# Patient Record
Sex: MALE | Race: BLACK OR AFRICAN AMERICAN | Employment: OTHER | URBAN - METROPOLITAN AREA
[De-identification: names, ages, dates, MRNs, and addresses within clinical notes are randomized per-mention and may not be internally consistent; named-entity substitution may affect disease eponyms.]

---

## 2017-02-26 ENCOUNTER — OFFICE VISIT (OUTPATIENT)
Dept: URGENT CARE | Facility: CLINIC | Age: 52
End: 2017-02-26
Payer: COMMERCIAL

## 2017-02-26 LAB — GLUCOSE SERPL-MCNC: 109 MG/DL (ref 65–140)

## 2017-02-26 PROCEDURE — 82948 REAGENT STRIP/BLOOD GLUCOSE: CPT

## 2017-02-26 PROCEDURE — 93005 ELECTROCARDIOGRAM TRACING: CPT

## 2017-02-26 PROCEDURE — G0383 LEV 4 HOSP TYPE B ED VISIT: HCPCS

## 2019-05-28 ENCOUNTER — APPOINTMENT (OUTPATIENT)
Dept: LAB | Facility: CLINIC | Age: 54
End: 2019-05-28
Payer: MEDICARE

## 2019-05-28 ENCOUNTER — TRANSCRIBE ORDERS (OUTPATIENT)
Dept: LAB | Facility: CLINIC | Age: 54
End: 2019-05-28

## 2019-05-28 DIAGNOSIS — E11.9 TYPE 2 DIABETES MELLITUS WITHOUT COMPLICATION, UNSPECIFIED WHETHER LONG TERM INSULIN USE (HCC): ICD-10-CM

## 2019-05-28 DIAGNOSIS — I10 ESSENTIAL HYPERTENSION, MALIGNANT: ICD-10-CM

## 2019-05-28 DIAGNOSIS — I10 ESSENTIAL HYPERTENSION, MALIGNANT: Primary | ICD-10-CM

## 2019-05-28 DIAGNOSIS — Z79.899 ENCOUNTER FOR LONG-TERM (CURRENT) USE OF OTHER MEDICATIONS: ICD-10-CM

## 2019-05-28 DIAGNOSIS — R60.9 EDEMA, UNSPECIFIED TYPE: ICD-10-CM

## 2019-05-28 DIAGNOSIS — N42.9 DISEASE OF PROSTATE: ICD-10-CM

## 2019-05-28 LAB
25(OH)D3 SERPL-MCNC: 13.6 NG/ML (ref 30–100)
ALBUMIN SERPL BCP-MCNC: 3.6 G/DL (ref 3.5–5)
ALP SERPL-CCNC: 71 U/L (ref 46–116)
ALT SERPL W P-5'-P-CCNC: 22 U/L (ref 12–78)
ANION GAP SERPL CALCULATED.3IONS-SCNC: 8 MMOL/L (ref 4–13)
AST SERPL W P-5'-P-CCNC: 12 U/L (ref 5–45)
BILIRUB SERPL-MCNC: 0.57 MG/DL (ref 0.2–1)
BUN SERPL-MCNC: 9 MG/DL (ref 5–25)
CALCIUM SERPL-MCNC: 8.9 MG/DL (ref 8.3–10.1)
CHLORIDE SERPL-SCNC: 104 MMOL/L (ref 100–108)
CHOLEST SERPL-MCNC: 231 MG/DL (ref 50–200)
CO2 SERPL-SCNC: 27 MMOL/L (ref 21–32)
CREAT SERPL-MCNC: 1.09 MG/DL (ref 0.6–1.3)
CREAT UR-MCNC: 161 MG/DL
ERYTHROCYTE [DISTWIDTH] IN BLOOD BY AUTOMATED COUNT: 14.6 % (ref 11.6–15.1)
EST. AVERAGE GLUCOSE BLD GHB EST-MCNC: 134 MG/DL
GFR SERPL CREATININE-BSD FRML MDRD: 88 ML/MIN/1.73SQ M
GLUCOSE P FAST SERPL-MCNC: 94 MG/DL (ref 65–99)
HBA1C MFR BLD: 6.3 % (ref 4.2–6.3)
HCT VFR BLD AUTO: 48 % (ref 36.5–49.3)
HDLC SERPL-MCNC: 36 MG/DL (ref 40–60)
HGB BLD-MCNC: 15.3 G/DL (ref 12–17)
LDLC SERPL CALC-MCNC: 167 MG/DL (ref 0–100)
MCH RBC QN AUTO: 29.1 PG (ref 26.8–34.3)
MCHC RBC AUTO-ENTMCNC: 31.9 G/DL (ref 31.4–37.4)
MCV RBC AUTO: 91 FL (ref 82–98)
MICROALBUMIN UR-MCNC: 13.8 MG/L (ref 0–20)
MICROALBUMIN/CREAT 24H UR: 9 MG/G CREATININE (ref 0–30)
NONHDLC SERPL-MCNC: 195 MG/DL
NT-PROBNP SERPL-MCNC: 96 PG/ML
PLATELET # BLD AUTO: 291 THOUSANDS/UL (ref 149–390)
PMV BLD AUTO: 10.1 FL (ref 8.9–12.7)
POTASSIUM SERPL-SCNC: 3.9 MMOL/L (ref 3.5–5.3)
PROT SERPL-MCNC: 7.8 G/DL (ref 6.4–8.2)
PSA SERPL-MCNC: 0.9 NG/ML (ref 0–4)
RBC # BLD AUTO: 5.26 MILLION/UL (ref 3.88–5.62)
SODIUM SERPL-SCNC: 139 MMOL/L (ref 136–145)
T4 FREE SERPL-MCNC: 0.95 NG/DL (ref 0.76–1.46)
TRIGL SERPL-MCNC: 139 MG/DL
TSH SERPL DL<=0.05 MIU/L-ACNC: 2.83 UIU/ML (ref 0.36–3.74)
WBC # BLD AUTO: 11.25 THOUSAND/UL (ref 4.31–10.16)

## 2019-05-28 PROCEDURE — 84439 ASSAY OF FREE THYROXINE: CPT

## 2019-05-28 PROCEDURE — G0103 PSA SCREENING: HCPCS

## 2019-05-28 PROCEDURE — 85027 COMPLETE CBC AUTOMATED: CPT

## 2019-05-28 PROCEDURE — 80061 LIPID PANEL: CPT

## 2019-05-28 PROCEDURE — 82570 ASSAY OF URINE CREATININE: CPT

## 2019-05-28 PROCEDURE — 82043 UR ALBUMIN QUANTITATIVE: CPT

## 2019-05-28 PROCEDURE — 80053 COMPREHEN METABOLIC PANEL: CPT

## 2019-05-28 PROCEDURE — 86803 HEPATITIS C AB TEST: CPT

## 2019-05-28 PROCEDURE — 84443 ASSAY THYROID STIM HORMONE: CPT

## 2019-05-28 PROCEDURE — 83880 ASSAY OF NATRIURETIC PEPTIDE: CPT

## 2019-05-28 PROCEDURE — 36415 COLL VENOUS BLD VENIPUNCTURE: CPT

## 2019-05-28 PROCEDURE — 83036 HEMOGLOBIN GLYCOSYLATED A1C: CPT

## 2019-05-28 PROCEDURE — 82306 VITAMIN D 25 HYDROXY: CPT

## 2019-05-29 LAB — HCV AB SER QL: NORMAL

## 2019-10-18 VITALS
HEART RATE: 112 BPM | DIASTOLIC BLOOD PRESSURE: 87 MMHG | HEIGHT: 70 IN | SYSTOLIC BLOOD PRESSURE: 139 MMHG | WEIGHT: 315 LBS | BODY MASS INDEX: 45.1 KG/M2 | RESPIRATION RATE: 18 BRPM

## 2019-10-18 DIAGNOSIS — M51.26 PROTRUSION OF LUMBAR INTERVERTEBRAL DISC: ICD-10-CM

## 2019-10-18 DIAGNOSIS — M48.061 FORAMINAL STENOSIS OF LUMBAR REGION: ICD-10-CM

## 2019-10-18 DIAGNOSIS — M47.816 FACET ARTHROPATHY, LUMBAR: ICD-10-CM

## 2019-10-18 DIAGNOSIS — M43.16 SPONDYLOLISTHESIS AT L4-L5 LEVEL: ICD-10-CM

## 2019-10-18 DIAGNOSIS — M48.00 CENTRAL STENOSIS OF SPINAL CANAL: Primary | ICD-10-CM

## 2019-10-18 DIAGNOSIS — M62.830 LUMBAR PARASPINAL MUSCLE SPASM: ICD-10-CM

## 2019-10-18 PROCEDURE — 99203 OFFICE O/P NEW LOW 30 MIN: CPT | Performed by: FAMILY MEDICINE

## 2019-10-18 RX ORDER — DICLOFENAC SODIUM 75 MG/1
75 TABLET, DELAYED RELEASE ORAL 2 TIMES DAILY
Qty: 60 TABLET | Refills: 2 | Status: SHIPPED | OUTPATIENT
Start: 2019-10-18 | End: 2020-02-20 | Stop reason: SDUPTHER

## 2019-10-18 RX ORDER — MELOXICAM 15 MG/1
TABLET ORAL
COMMUNITY
End: 2020-08-21 | Stop reason: HOSPADM

## 2019-10-18 RX ORDER — PREGABALIN 200 MG/1
200 CAPSULE ORAL DAILY
COMMUNITY

## 2019-10-18 RX ORDER — METHOCARBAMOL 750 MG/1
750 TABLET, FILM COATED ORAL 3 TIMES DAILY PRN
Qty: 90 TABLET | Refills: 2 | Status: SHIPPED | OUTPATIENT
Start: 2019-10-18 | End: 2020-08-21 | Stop reason: HOSPADM

## 2019-10-18 RX ORDER — IRBESARTAN AND HYDROCHLOROTHIAZIDE 300; 12.5 MG/1; MG/1
TABLET, FILM COATED ORAL
COMMUNITY
End: 2020-08-21 | Stop reason: HOSPADM
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
## 2019-10-23 ENCOUNTER — TELEPHONE (OUTPATIENT)
Dept: OBGYN CLINIC | Facility: CLINIC | Age: 54
End: 2019-10-23

## 2019-10-23 DIAGNOSIS — M62.830 LUMBAR PARASPINAL MUSCLE SPASM: Primary | ICD-10-CM

## 2019-10-23 RX ORDER — CYCLOBENZAPRINE HCL 10 MG
10 TABLET ORAL 2 TIMES DAILY PRN
Qty: 30 TABLET | Refills: 1 | Status: SHIPPED | OUTPATIENT
Start: 2019-10-23

## 2019-10-23 NOTE — TELEPHONE ENCOUNTER
Dr Adame           [facility] pharmacy called in stating methocarbamol (ROBAXIN) 750 mg is not covered by his insurance   Please advise, thank you

## 2019-10-24 NOTE — TELEPHONE ENCOUNTER
Spoke with patient and advised him that a script for cyclobenzaprine was sent to his pharmacy which he may  at his earliest convenience  Patient verbalized understanding of the same

## 2019-10-24 NOTE — TELEPHONE ENCOUNTER
Please advise patient that Cyclobenzaprine was sent to his pharmacy  He may  at his earliest convenience    Thanks

## 2019-10-28 ENCOUNTER — OFFICE VISIT (OUTPATIENT)
Dept: OBGYN CLINIC | Facility: HOSPITAL | Age: 54
End: 2019-10-28
Attending: FAMILY MEDICINE
Payer: COMMERCIAL

## 2019-10-28 VITALS
SYSTOLIC BLOOD PRESSURE: 126 MMHG | WEIGHT: 315 LBS | BODY MASS INDEX: 45.1 KG/M2 | DIASTOLIC BLOOD PRESSURE: 84 MMHG | HEIGHT: 70 IN | HEART RATE: 106 BPM

## 2019-10-28 DIAGNOSIS — M47.816 FACET ARTHROPATHY, LUMBAR: ICD-10-CM

## 2019-10-28 DIAGNOSIS — M51.26 PROTRUSION OF LUMBAR INTERVERTEBRAL DISC: ICD-10-CM

## 2019-10-28 DIAGNOSIS — M48.00 CENTRAL STENOSIS OF SPINAL CANAL: ICD-10-CM

## 2019-10-28 DIAGNOSIS — M48.061 FORAMINAL STENOSIS OF LUMBAR REGION: ICD-10-CM

## 2019-10-28 DIAGNOSIS — M54.16 LUMBAR RADICULOPATHY: Primary | ICD-10-CM

## 2019-10-28 DIAGNOSIS — M43.16 SPONDYLOLISTHESIS AT L4-L5 LEVEL: ICD-10-CM

## 2019-10-28 PROCEDURE — 99213 OFFICE O/P EST LOW 20 MIN: CPT | Performed by: ORTHOPAEDIC SURGERY
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
## 2020-01-30 PROBLEM — E11.9 DIABETES MELLITUS (HCC): Status: ACTIVE | Noted: 2017-09-14

## 2020-01-30 PROBLEM — A04.8 HELICOBACTER PYLORI INFECTION: Status: ACTIVE | Noted: 2017-02-26

## 2020-01-30 PROBLEM — M51.26 HERNIATION OF LUMBAR INTERVERTEBRAL DISC: Status: ACTIVE | Noted: 2017-02-26

## 2020-01-30 PROBLEM — G89.29 CHRONIC PAIN: Status: ACTIVE | Noted: 2017-09-14

## 2020-01-30 PROBLEM — E78.1 HYPERTRIGLYCERIDEMIA: Status: ACTIVE | Noted: 2017-03-22

## 2020-01-30 PROBLEM — J44.9 CHRONIC OBSTRUCTIVE LUNG DISEASE (HCC): Status: ACTIVE | Noted: 2017-09-14

## 2020-01-30 PROBLEM — K21.9 GERD (GASTROESOPHAGEAL REFLUX DISEASE): Status: ACTIVE | Noted: 2017-02-26

## 2020-01-30 PROBLEM — M65.30 ACQUIRED TRIGGER FINGER: Status: ACTIVE | Noted: 2020-01-13

## 2020-01-30 PROBLEM — J45.909 ASTHMA: Status: ACTIVE | Noted: 2017-09-14

## 2020-01-31 ENCOUNTER — TELEPHONE (OUTPATIENT)
Dept: OTHER | Facility: OTHER | Age: 55
End: 2020-01-31

## 2020-02-12 DIAGNOSIS — M47.816 FACET ARTHROPATHY, LUMBAR: ICD-10-CM

## 2020-02-14 ENCOUNTER — TELEPHONE (OUTPATIENT)
Dept: OBGYN CLINIC | Facility: HOSPITAL | Age: 55
End: 2020-02-14

## 2020-02-14 NOTE — TELEPHONE ENCOUNTER
Patient of Dr Cahti Jolley called for refill on:     diclofenac (VOLTAREN) 75 mg EC tablet [[id number]]     Order Details   Dose: 75 mg Route: Oral Frequency: 2 times daily   Dispense Quantity: 60 tablet Refills: 2 Fills remaining: --           Sig: Take 1 tablet (75 mg total) by mouth 2 (two) times a day            PHARMACY    [facility] [place] [address] [facility] Phone:  [phone] Fax:  [phone]    Address:  [address] [place] [address], [address] [place] [address]  [id number] HEMA #:  [id number]

## 2020-02-17 NOTE — TELEPHONE ENCOUNTER
Please reach out to patient and advise him that if he is currently seeing his pain management provider, he may request Rx from his provider      Thanks

## 2020-02-20 DIAGNOSIS — M54.16 RADICULOPATHY, LUMBAR REGION: Primary | ICD-10-CM

## 2020-02-20 DIAGNOSIS — M47.816 FACET ARTHROPATHY, LUMBAR: ICD-10-CM

## 2020-02-20 RX ORDER — DICLOFENAC SODIUM 75 MG/1
75 TABLET, DELAYED RELEASE ORAL 2 TIMES DAILY
Qty: 60 TABLET | Refills: 1 | Status: SHIPPED | OUTPATIENT
Start: 2020-02-20 | End: 2020-04-09 | Stop reason: SDUPTHER

## 2020-02-20 NOTE — TELEPHONE ENCOUNTER
Patient called in requesting a refill of diclofenac (VOLTAREN) 75mg  Provide msg above  Patient will be reaching out to pain management

## 2020-02-20 NOTE — TELEPHONE ENCOUNTER
Refill sent to pharmacy  Referral placed for pain management Dr Saman Durand  Please assist patient with making appointment to see pain management      Thanks

## 2020-02-20 NOTE — TELEPHONE ENCOUNTER
Patient called stating that his other doctor can not fill RX because he is out of town  He is asking if you can do one more refill since he is out completely  Please advise  [facility]   He is also asking for a referral to another doctor, states you are aware of his problem

## 2020-04-09 ENCOUNTER — TELEPHONE (OUTPATIENT)
Dept: OBGYN CLINIC | Facility: HOSPITAL | Age: 55
End: 2020-04-09

## 2020-04-09 DIAGNOSIS — M47.816 FACET ARTHROPATHY, LUMBAR: ICD-10-CM

## 2020-04-09 RX ORDER — DICLOFENAC SODIUM 75 MG/1
75 TABLET, DELAYED RELEASE ORAL 2 TIMES DAILY
Qty: 60 TABLET | Refills: 1 | Status: SHIPPED | OUTPATIENT
Start: 2020-04-09 | End: 2020-08-21 | Stop reason: HOSPADM

## 2020-04-21 RX ORDER — DICLOFENAC SODIUM 75 MG/1
TABLET, DELAYED RELEASE ORAL
Qty: 60 TABLET | Refills: 2 | OUTPATIENT
Start: 2020-04-21

## 2020-06-26 ENCOUNTER — APPOINTMENT (EMERGENCY)
Dept: CT IMAGING | Facility: HOSPITAL | Age: 55
DRG: 291 | End: 2020-06-26
Payer: MEDICARE

## 2020-06-26 ENCOUNTER — APPOINTMENT (EMERGENCY)
Dept: RADIOLOGY | Facility: HOSPITAL | Age: 55
DRG: 291 | End: 2020-06-26
Payer: MEDICARE

## 2020-06-26 ENCOUNTER — HOSPITAL ENCOUNTER (INPATIENT)
Facility: HOSPITAL | Age: 55
LOS: 3 days | Discharge: LEFT BEFORE DISCHARGE | DRG: 291 | End: 2020-06-29
Attending: EMERGENCY MEDICINE | Admitting: FAMILY MEDICINE
Payer: MEDICARE

## 2020-06-26 ENCOUNTER — APPOINTMENT (INPATIENT)
Dept: NON INVASIVE DIAGNOSTICS | Facility: HOSPITAL | Age: 55
DRG: 291 | End: 2020-06-26
Payer: MEDICARE

## 2020-06-26 DIAGNOSIS — I50.21 ACUTE SYSTOLIC CONGESTIVE HEART FAILURE (HCC): ICD-10-CM

## 2020-06-26 DIAGNOSIS — R77.8 ELEVATED TROPONIN: ICD-10-CM

## 2020-06-26 DIAGNOSIS — R06.00 DYSPNEA, UNSPECIFIED TYPE: Primary | ICD-10-CM

## 2020-06-26 PROBLEM — I50.9 ACUTE CHF (CONGESTIVE HEART FAILURE) (HCC): Status: ACTIVE | Noted: 2020-06-26

## 2020-06-26 LAB
ANION GAP SERPL CALCULATED.3IONS-SCNC: 11 MMOL/L (ref 4–13)
ATRIAL RATE: 114 BPM
BASOPHILS # BLD AUTO: 0.04 THOUSANDS/ΜL (ref 0–0.1)
BASOPHILS NFR BLD AUTO: 0 % (ref 0–1)
BUN SERPL-MCNC: 21 MG/DL (ref 5–25)
CALCIUM SERPL-MCNC: 8.1 MG/DL (ref 8.3–10.1)
CHLORIDE SERPL-SCNC: 102 MMOL/L (ref 100–108)
CO2 SERPL-SCNC: 23 MMOL/L (ref 21–32)
CREAT SERPL-MCNC: 1.34 MG/DL (ref 0.6–1.3)
D DIMER PPP FEU-MCNC: 2.06 UG/ML FEU
EOSINOPHIL # BLD AUTO: 0.22 THOUSAND/ΜL (ref 0–0.61)
EOSINOPHIL NFR BLD AUTO: 2 % (ref 0–6)
ERYTHROCYTE [DISTWIDTH] IN BLOOD BY AUTOMATED COUNT: 16.2 % (ref 11.6–15.1)
EST. AVERAGE GLUCOSE BLD GHB EST-MCNC: 131 MG/DL
GFR SERPL CREATININE-BSD FRML MDRD: 68 ML/MIN/1.73SQ M
GLUCOSE SERPL-MCNC: 178 MG/DL (ref 65–140)
HBA1C MFR BLD: 6.2 %
HCT VFR BLD AUTO: 44 % (ref 36.5–49.3)
HGB BLD-MCNC: 14.1 G/DL (ref 12–17)
IMM GRANULOCYTES # BLD AUTO: 0.07 THOUSAND/UL (ref 0–0.2)
IMM GRANULOCYTES NFR BLD AUTO: 1 % (ref 0–2)
LYMPHOCYTES # BLD AUTO: 3.13 THOUSANDS/ΜL (ref 0.6–4.47)
LYMPHOCYTES NFR BLD AUTO: 27 % (ref 14–44)
MCH RBC QN AUTO: 29.3 PG (ref 26.8–34.3)
MCHC RBC AUTO-ENTMCNC: 32 G/DL (ref 31.4–37.4)
MCV RBC AUTO: 92 FL (ref 82–98)
MONOCYTES # BLD AUTO: 0.77 THOUSAND/ΜL (ref 0.17–1.22)
MONOCYTES NFR BLD AUTO: 7 % (ref 4–12)
NEUTROPHILS # BLD AUTO: 7.18 THOUSANDS/ΜL (ref 1.85–7.62)
NEUTS SEG NFR BLD AUTO: 63 % (ref 43–75)
NRBC BLD AUTO-RTO: 0 /100 WBCS
NT-PROBNP SERPL-MCNC: 4410 PG/ML
P AXIS: 66 DEGREES
PLATELET # BLD AUTO: 252 THOUSANDS/UL (ref 149–390)
PMV BLD AUTO: 10 FL (ref 8.9–12.7)
POTASSIUM SERPL-SCNC: 4.1 MMOL/L (ref 3.5–5.3)
PR INTERVAL: 138 MS
QRS AXIS: -26 DEGREES
QRSD INTERVAL: 74 MS
QT INTERVAL: 330 MS
QTC INTERVAL: 454 MS
RBC # BLD AUTO: 4.81 MILLION/UL (ref 3.88–5.62)
SARS-COV-2 RNA RESP QL NAA+PROBE: NEGATIVE
SODIUM SERPL-SCNC: 136 MMOL/L (ref 136–145)
T WAVE AXIS: 89 DEGREES
TROPONIN I SERPL-MCNC: 0.09 NG/ML
TROPONIN I SERPL-MCNC: 0.09 NG/ML
VENTRICULAR RATE: 114 BPM
WBC # BLD AUTO: 11.41 THOUSAND/UL (ref 4.31–10.16)

## 2020-06-26 PROCEDURE — 96365 THER/PROPH/DIAG IV INF INIT: CPT

## 2020-06-26 PROCEDURE — 94640 AIRWAY INHALATION TREATMENT: CPT

## 2020-06-26 PROCEDURE — 94760 N-INVAS EAR/PLS OXIMETRY 1: CPT

## 2020-06-26 PROCEDURE — 85379 FIBRIN DEGRADATION QUANT: CPT | Performed by: EMERGENCY MEDICINE

## 2020-06-26 PROCEDURE — 93010 ELECTROCARDIOGRAM REPORT: CPT | Performed by: INTERNAL MEDICINE

## 2020-06-26 PROCEDURE — 99285 EMERGENCY DEPT VISIT HI MDM: CPT

## 2020-06-26 PROCEDURE — 83880 ASSAY OF NATRIURETIC PEPTIDE: CPT | Performed by: EMERGENCY MEDICINE

## 2020-06-26 PROCEDURE — 93005 ELECTROCARDIOGRAM TRACING: CPT

## 2020-06-26 PROCEDURE — 36415 COLL VENOUS BLD VENIPUNCTURE: CPT | Performed by: EMERGENCY MEDICINE

## 2020-06-26 PROCEDURE — 93306 TTE W/DOPPLER COMPLETE: CPT

## 2020-06-26 PROCEDURE — 80048 BASIC METABOLIC PNL TOTAL CA: CPT | Performed by: EMERGENCY MEDICINE

## 2020-06-26 PROCEDURE — 85025 COMPLETE CBC W/AUTO DIFF WBC: CPT | Performed by: EMERGENCY MEDICINE

## 2020-06-26 PROCEDURE — 84484 ASSAY OF TROPONIN QUANT: CPT | Performed by: EMERGENCY MEDICINE

## 2020-06-26 PROCEDURE — 87635 SARS-COV-2 COVID-19 AMP PRB: CPT | Performed by: EMERGENCY MEDICINE

## 2020-06-26 PROCEDURE — 71045 X-RAY EXAM CHEST 1 VIEW: CPT

## 2020-06-26 PROCEDURE — 93306 TTE W/DOPPLER COMPLETE: CPT | Performed by: INTERNAL MEDICINE

## 2020-06-26 PROCEDURE — 96375 TX/PRO/DX INJ NEW DRUG ADDON: CPT

## 2020-06-26 PROCEDURE — 71275 CT ANGIOGRAPHY CHEST: CPT

## 2020-06-26 PROCEDURE — 99285 EMERGENCY DEPT VISIT HI MDM: CPT | Performed by: EMERGENCY MEDICINE

## 2020-06-26 PROCEDURE — 83036 HEMOGLOBIN GLYCOSYLATED A1C: CPT | Performed by: FAMILY MEDICINE

## 2020-06-26 PROCEDURE — 99223 1ST HOSP IP/OBS HIGH 75: CPT | Performed by: FAMILY MEDICINE

## 2020-06-26 RX ORDER — HYDRALAZINE HYDROCHLORIDE 20 MG/ML
10 INJECTION INTRAMUSCULAR; INTRAVENOUS EVERY 6 HOURS PRN
Status: DISCONTINUED | OUTPATIENT
Start: 2020-06-26 | End: 2020-06-29 | Stop reason: HOSPADM

## 2020-06-26 RX ORDER — FUROSEMIDE 10 MG/ML
40 INJECTION INTRAMUSCULAR; INTRAVENOUS
Status: DISCONTINUED | OUTPATIENT
Start: 2020-06-26 | End: 2020-06-29 | Stop reason: HOSPADM

## 2020-06-26 RX ORDER — METHOCARBAMOL 500 MG/1
750 TABLET, FILM COATED ORAL 3 TIMES DAILY PRN
Status: DISCONTINUED | OUTPATIENT
Start: 2020-06-26 | End: 2020-06-26

## 2020-06-26 RX ORDER — OXYCODONE HYDROCHLORIDE 5 MG/1
5 TABLET ORAL EVERY 4 HOURS PRN
Status: DISCONTINUED | OUTPATIENT
Start: 2020-06-26 | End: 2020-06-29 | Stop reason: HOSPADM

## 2020-06-26 RX ORDER — LEVALBUTEROL 1.25 MG/.5ML
1.25 SOLUTION, CONCENTRATE RESPIRATORY (INHALATION)
Status: DISCONTINUED | OUTPATIENT
Start: 2020-06-26 | End: 2020-06-29 | Stop reason: HOSPADM

## 2020-06-26 RX ORDER — ONDANSETRON 2 MG/ML
4 INJECTION INTRAMUSCULAR; INTRAVENOUS EVERY 6 HOURS PRN
Status: DISCONTINUED | OUTPATIENT
Start: 2020-06-26 | End: 2020-06-29 | Stop reason: HOSPADM

## 2020-06-26 RX ORDER — CYCLOBENZAPRINE HCL 10 MG
10 TABLET ORAL 2 TIMES DAILY PRN
Status: DISCONTINUED | OUTPATIENT
Start: 2020-06-26 | End: 2020-06-29 | Stop reason: HOSPADM

## 2020-06-26 RX ORDER — ALBUTEROL SULFATE 2.5 MG/3ML
2.5 SOLUTION RESPIRATORY (INHALATION) EVERY 6 HOURS PRN
Status: DISCONTINUED | OUTPATIENT
Start: 2020-06-26 | End: 2020-06-29 | Stop reason: HOSPADM

## 2020-06-26 RX ORDER — MAGNESIUM SULFATE HEPTAHYDRATE 40 MG/ML
2 INJECTION, SOLUTION INTRAVENOUS ONCE
Status: COMPLETED | OUTPATIENT
Start: 2020-06-26 | End: 2020-06-26

## 2020-06-26 RX ORDER — FUROSEMIDE 10 MG/ML
20 INJECTION INTRAMUSCULAR; INTRAVENOUS ONCE
Status: COMPLETED | OUTPATIENT
Start: 2020-06-26 | End: 2020-06-26

## 2020-06-26 RX ORDER — NICOTINE 21 MG/24HR
1 PATCH, TRANSDERMAL 24 HOURS TRANSDERMAL DAILY
Status: DISCONTINUED | OUTPATIENT
Start: 2020-06-26 | End: 2020-06-29 | Stop reason: HOSPADM

## 2020-06-26 RX ORDER — METHYLPREDNISOLONE SODIUM SUCCINATE 125 MG/2ML
125 INJECTION, POWDER, LYOPHILIZED, FOR SOLUTION INTRAMUSCULAR; INTRAVENOUS ONCE
Status: COMPLETED | OUTPATIENT
Start: 2020-06-26 | End: 2020-06-26

## 2020-06-26 RX ORDER — ACETAMINOPHEN 325 MG/1
650 TABLET ORAL EVERY 6 HOURS PRN
Status: DISCONTINUED | OUTPATIENT
Start: 2020-06-26 | End: 2020-06-29 | Stop reason: HOSPADM

## 2020-06-26 RX ORDER — AMLODIPINE BESYLATE 2.5 MG/1
2.5 TABLET ORAL 2 TIMES DAILY
Status: DISCONTINUED | OUTPATIENT
Start: 2020-06-26 | End: 2020-06-26

## 2020-06-26 RX ORDER — IPRATROPIUM BROMIDE AND ALBUTEROL SULFATE 2.5; .5 MG/3ML; MG/3ML
3 SOLUTION RESPIRATORY (INHALATION) ONCE
Status: COMPLETED | OUTPATIENT
Start: 2020-06-26 | End: 2020-06-26

## 2020-06-26 RX ADMIN — LEVALBUTEROL 1.25 MG: 1.25 SOLUTION, CONCENTRATE RESPIRATORY (INHALATION) at 20:10

## 2020-06-26 RX ADMIN — LEVALBUTEROL 1.25 MG: 1.25 SOLUTION, CONCENTRATE RESPIRATORY (INHALATION) at 14:47

## 2020-06-26 RX ADMIN — METHYLPREDNISOLONE SODIUM SUCCINATE 125 MG: 125 INJECTION, POWDER, FOR SOLUTION INTRAMUSCULAR; INTRAVENOUS at 08:35

## 2020-06-26 RX ADMIN — IPRATROPIUM BROMIDE AND ALBUTEROL SULFATE 3 ML: 2.5; .5 SOLUTION RESPIRATORY (INHALATION) at 08:43

## 2020-06-26 RX ADMIN — ENOXAPARIN SODIUM 40 MG: 40 INJECTION SUBCUTANEOUS at 13:42

## 2020-06-26 RX ADMIN — IPRATROPIUM BROMIDE 0.5 MG: 0.5 SOLUTION RESPIRATORY (INHALATION) at 14:47

## 2020-06-26 RX ADMIN — METOPROLOL TARTRATE 25 MG: 25 TABLET, FILM COATED ORAL at 20:12

## 2020-06-26 RX ADMIN — PREGABALIN 200 MG: 50 CAPSULE ORAL at 13:38

## 2020-06-26 RX ADMIN — IOHEXOL 85 ML: 350 INJECTION, SOLUTION INTRAVENOUS at 10:26

## 2020-06-26 RX ADMIN — FUROSEMIDE 40 MG: 10 INJECTION, SOLUTION INTRAMUSCULAR; INTRAVENOUS at 16:39

## 2020-06-26 RX ADMIN — SODIUM CHLORIDE 1000 ML: 0.9 INJECTION, SOLUTION INTRAVENOUS at 08:33

## 2020-06-26 RX ADMIN — MAGNESIUM SULFATE 2 G: 2 INJECTION INTRAVENOUS at 08:41

## 2020-06-26 RX ADMIN — FUROSEMIDE 20 MG: 10 INJECTION, SOLUTION INTRAMUSCULAR; INTRAVENOUS at 10:53

## 2020-06-26 RX ADMIN — IPRATROPIUM BROMIDE 0.5 MG: 0.5 SOLUTION RESPIRATORY (INHALATION) at 20:10

## 2020-06-26 NOTE — SOCIAL WORK
Cm met with the patient to evaluate the patients prior function and living situation and any barriers to d/c and form a safe d/c plan  Cm also evaluated the patient for any services in the home or needs for services  Pt resides at home with his wife and is independent with his adls/ambulation  No services or DME  No PCP-number for Info Link put on AVS so that pt can get established with a PCP in the [address] [place] [address] Avenue as he does not have a PCP  Pharmacy is [facility]  Plans at this time are home on dc with OP follow up  Family to transport pt home on dc  CM will follow and assist in dc planning

## 2020-06-26 NOTE — ASSESSMENT & PLAN NOTE
Wt Readings from Last 3 Encounters:   06/26/20 (!) 152 kg (334 lb 10 5 oz)   10/28/19 (!) 153 kg (337 lb)   10/18/19 (!) 154 kg (339 lb 9 6 oz)     Patient presenting with orthopnea, pulmonary edema, elevated proBNP, cardiomegaly, long-standing HTN  - admit to [facility] with telemetry  - IV Lasix  - Echo  - Cardiology consult if still hospitalized Monday  - Monitor daily weights/I's/O's  - 2gm Na diet

## 2020-06-26 NOTE — H&P
H&P- Orly Bright 1965, 62 y o  male MRN: [id number]    Unit/Bed#: 419-01 Encounter: [id number]    Primary Care Provider: No primary care provider on file  Date and time admitted to hospital: 6/26/2020  7:48 AM        * Acute CHF (congestive heart failure) (HCC)  Assessment & Plan  Wt Readings from Last 3 Encounters:   06/26/20 (!) 152 kg (334 lb 10 5 oz)   10/28/19 (!) 153 kg (337 lb)   10/18/19 (!) 154 kg (339 lb 9 6 oz)     Patient presenting with orthopnea, pulmonary edema, elevated proBNP, cardiomegaly, long-standing HTN  - admit to -S with telemetry  - IV Lasix  - Echo  - Cardiology consult if still hospitalized Monday  - Monitor daily weights/I's/O's  - 2gm Na diet      Essential hypertension  Assessment & Plan  Uncontrolled  Arb/HCTZ held due to elevated Cr at 1 3 with baseline of 1 09  IV Lasix for acute CHF  Add BB with uncontrolled BP, tachycardia   Hydralazine IV PRN     Morbid obesity ([facility] [address] )  Assessment & Plan  Diet modifications/Nutrition consult     GERD (gastroesophageal reflux disease)  Assessment & Plan  Not on PPI therapy     Chronic obstructive lung disease ([facility] [address] )  Assessment & Plan  Does not appear in exacerbation  Respiratory protocol       VTE Prophylaxis: Enoxaparin (Lovenox)  / sequential compression device   Code Status: Full  POLST: POLST form is not discussed and not completed at this time  Discussion with family: Patient    Anticipated Length of Stay:  Patient will be admitted on an Inpatient basis with an anticipated length of stay of longer than 2 midnights  Justification for Hospital Stay: IV Lasix, cardiac workup    Total Time for Visit, including Counseling / Coordination of Care: 1 hour  Greater than 50% of this total time spent on direct patient counseling and coordination of care      Chief Complaint:   SOB    History of Present Illness:    Orly Bright is a 62 y o  male with history of obesity , prediabetes, COPD/asthma on hypertension who presents with shortness of breath  The patient states that for the past 2 weeks he has been experiencing shortness of breath that has been getting progressively worse  The patient notes that his breathing is worse when lying down  He denies paroxysmal nocturnal dyspnea  He denies change in his exertional dyspnea  He also denies chest pain or palpitations at rest or on exertion  Denies lower extremity edema or abdominal distention  He denies chills or fevers  He reports normal appetite, denies nausea, vomiting, diarrhea constipation  The patient notes that he has been following dietary modifications and order to lose weight  Review of Systems:    Review of Systems   Constitutional: Negative for chills and fever  HENT: Negative for congestion  Eyes: Negative for visual disturbance  Respiratory: Positive for shortness of breath  Negative for wheezing  Cardiovascular: Negative for chest pain, palpitations and leg swelling  Gastrointestinal: Negative for abdominal pain, constipation, diarrhea, nausea and vomiting  Endocrine: Negative for polydipsia and polyuria  Genitourinary: Negative for dysuria  Musculoskeletal: Positive for back pain  Skin: Negative for rash  Neurological: Negative for dizziness  Hematological: Negative for adenopathy  Psychiatric/Behavioral: Negative for confusion  Past Medical and Surgical History:     Past Medical History:   Diagnosis Date    Asthma     Back pain     Carpal tunnel syndrome     Hypertension     Knee pain     Neck pain        Past Surgical History:   Procedure Laterality Date    CARPAL TUNNEL RELEASE      HAND SURGERY         Meds/Allergies:    Prior to Admission medications    Medication Sig Start Date End Date Taking?  Authorizing Provider   cyclobenzaprine (FLEXERIL) 10 mg tablet Take 1 tablet (10 mg total) by mouth 2 (two) times a day as needed for muscle spasms 10/23/19  Yes Olga Adame DO   diclofenac (VOLTAREN) 75 mg EC tablet Take 1 tablet (75 mg total) by mouth 2 (two) times a day 4/9/20  Yes Olga Adame DO   pregabalin (LYRICA) 200 MG capsule Take 200 mg by mouth daily    Yes Historical Provider, MD   irbesartan-hydrochlorothiazide (AVALIDE) 300-12 5 MG per tablet irbesartan 300 mg-hydrochlorothiazide 12 5 mg tablet    Historical Provider, MD   meloxicam (MOBIC) 15 mg tablet meloxicam 15 mg tablet    Historical Provider, MD   methocarbamol (ROBAXIN) 750 mg tablet Take 1 tablet (750 mg total) by mouth 3 (three) times a day as needed for muscle spasms  Patient not taking: Reported on 6/26/2020 10/18/19   [facility] DO Deep     I have reviewed home medications with a medical source (PCP, Pharmacy, other)      Allergies: No Known Allergies    Social History:     Marital Status: /Civil Union   Occupation: unemployed  Patient Pre-hospital Living Situation: independent  Patient Pre-hospital Level of Mobility: limited  Patient Pre-hospital Diet Restrictions: none  Substance Use History:   Social History     Substance and Sexual Activity   Alcohol Use Yes    Comment: occasional     Social History     Tobacco Use   Smoking Status Current Every Day Smoker    Packs/day: 0 50    Years: 36 00    Pack years: 18 00    Types: Cigarettes   Smokeless Tobacco Never Used     Social History     Substance and Sexual Activity   Drug Use Yes    Types: Marijuana    Comment: periodically/on ocassion       Family History:    Family History   Problem Relation Age of Onset    Cancer Mother     Hypertension Mother     Hypertension Father     Gout Father     Heart attack Father     Heart disease Father     Lung cancer Sister        Physical Exam:     Vitals:   Blood Pressure: (!) 182/135 (06/26/20 1550)  Pulse: (!) 121 (06/26/20 1550)  Temperature: (!) 97 3 °F (36 3 °C) (06/26/20 0752)  Temp Source: Temporal (06/26/20 0752)  Respirations: (!) 24 (06/26/20 1445)  Height: 5' 9" (175 3 cm) (06/26/20 1601)  Weight - Scale: (!) 152 kg (334 lb 10 5 oz) (06/26/20 0752)  SpO2: 100 % (06/26/20 1447)    Physical Exam   Constitutional: He is oriented to person, place, and time  Obese    HENT:   Head: Normocephalic and atraumatic  Eyes: Conjunctivae are normal    Neck: No JVD present  Cardiovascular: Regular rhythm  Tachycardia present  No murmur heard  Pulmonary/Chest: Effort normal  No respiratory distress  He has decreased breath sounds  He has no wheezes  Abdominal: Soft  He exhibits distension  There is no tenderness  There is no guarding  Musculoskeletal: He exhibits no edema  Neurological: He is alert and oriented to person, place, and time  Skin: Skin is warm and dry  Psychiatric: He has a normal mood and affect  Additional Data:     Lab Results: I have personally reviewed pertinent reports  Results from last 7 days   Lab Units 06/26/20  0816   WBC Thousand/uL 11 41*   HEMOGLOBIN g/dL 14 1   HEMATOCRIT % 44 0   PLATELETS Thousands/uL 252   NEUTROS PCT % 63   LYMPHS PCT % 27   MONOS PCT % 7   EOS PCT % 2     Results from last 7 days   Lab Units 06/26/20  0816   SODIUM mmol/L 136   POTASSIUM mmol/L 4 1   CHLORIDE mmol/L 102   CO2 mmol/L 23   BUN mg/dL 21   CREATININE mg/dL 1 34*   ANION GAP mmol/L 11   CALCIUM mg/dL 8 1*   GLUCOSE RANDOM mg/dL 178*             Results from last 7 days   Lab Units 06/26/20  0816   HEMOGLOBIN A1C % 6 2*           Imaging: I have personally reviewed pertinent reports  CTA ED chest PE Study   Final Result by Sandra Rosado MD (06/26 1994)         1  No evidence for acute pulmonary embolism or other acute intrathoracic abnormality  2   Dilated central pulmonary arterial vasculature suggesting pulmonary arterial hypertension  Reflux of contrast into the inferior vena cava and hepatic veins is consistent with elevated right heart pressures  3   Cardiomegaly                    Workstation performed: [id number]         XR chest 1 view portable   Final Result by Nisha Desai MD (06/26 7443) No acute cardiopulmonary disease  Workstation performed: [id number]             EKG, Pathology, and Other Studies Reviewed on Admission:   · EKG: reviewed, sinus tachycardia     Allscripts / Epic Records Reviewed: Yes     ** Please Note: This note has been constructed using a voice recognition system   **

## 2020-06-26 NOTE — ASSESSMENT & PLAN NOTE
Uncontrolled  Arb/HCTZ held due to elevated Cr at 1 3 with baseline of 1 09  IV Lasix for acute CHF  Add BB with uncontrolled BP, tachycardia   Hydralazine IV PRN

## 2020-06-26 NOTE — RESPIRATORY THERAPY NOTE
RT Protocol Note  Dann Lawler 62 y o  male MRN: [id number]  Unit/Bed#: TR15 Encounter: [id number]    Assessment    Active Problems:    Chronic obstructive lung disease (HCC)    GERD (gastroesophageal reflux disease)    Morbid obesity ([address] )    Essential hypertension      Home Pulmonary Medications:  Albuterol PRN inhaler       Past Medical History:   Diagnosis Date    Asthma     Back pain     Carpal tunnel syndrome     Hypertension     Knee pain     Neck pain      Social History     Socioeconomic History    Marital status: /Civil Union     Spouse name: None    Number of children: None    Years of education: None    Highest education level: None   Occupational History    None   Social Needs    Financial resource strain: None    Food insecurity:     Worry: None     Inability: None    Transportation needs:     Medical: None     Non-medical: None   Tobacco Use    Smoking status: Current Every Day Smoker     Packs/day: 0 50     Years: 36 00     Pack years: 18 00     Types: Cigarettes    Smokeless tobacco: Never Used   Substance and Sexual Activity    Alcohol use: Yes     Comment: occasional    Drug use: Yes     Types: Marijuana     Comment: periodically/on ocassion    Sexual activity: None   Lifestyle    Physical activity:     Days per week: None     Minutes per session: None    Stress: None   Relationships    Social connections:     Talks on phone: None     Gets together: None     Attends Protestant service: None     Active member of club or organization: None     Attends meetings of clubs or organizations: None     Relationship status: None    Intimate partner violence:     Fear of current or ex partner: None     Emotionally abused: None     Physically abused: None     Forced sexual activity: None   Other Topics Concern    None   Social History Narrative    None       Subjective         Objective    Physical Exam:   Assessment Type: (P) Assess only  General Appearance: (P) Alert, Awake  Respiratory Pattern: (P) Tachypneic  Chest Assessment: (P) Chest expansion symmetrical  Bilateral Breath Sounds: (P) Diminished  Cough: (P) None  O2 Device: (P) RA    Vitals:  Blood pressure (!) 173/94, pulse (!) 108, temperature (!) 97 3 °F (36 3 °C), temperature source Temporal, resp  rate 22, weight (!) 152 kg (334 lb 10 5 oz), SpO2 97 %  Imaging and other studies: I have personally reviewed pertinent reports  O2 Device: (P) RA     Plan    Respiratory Plan: (P) Mild Distress pathway      Pt admitted to SOB/Dry cough lasting 2 weeks  Pt currently on RA SpO2 97%  BS diminished Has a hx of Asthma takes PRN albuterol inhalers at home  Feels like they have not been working will order TID xopenex 1 25 and Atrovent  Pt CXR read clear lung fields  He recently quit smoking  Will cont on Respiratory protocol at this time  Pt also interested in a pulmonologist to set up outpatient appt

## 2020-06-26 NOTE — PLAN OF CARE
Problem: Potential for Falls  Goal: Patient will remain free of falls  Description  INTERVENTIONS:  - Assess patient frequently for physical needs  -  Identify cognitive and physical deficits and behaviors that affect risk of falls    -  [place] fall precautions as indicated by assessment   - Educate patient/family on patient safety including physical limitations  - Instruct patient to call for assistance with activity based on assessment  - Modify environment to reduce risk of injury  - Consider OT/PT consult to assist with strengthening/mobility  Outcome: Progressing     Problem: CARDIOVASCULAR - ADULT  Goal: Maintains optimal cardiac output and hemodynamic stability  Description  INTERVENTIONS:  - Monitor I/O, vital signs and rhythm  - Monitor for S/S and trends of decreased cardiac output  - Administer and titrate ordered vasoactive medications to optimize hemodynamic stability  - Assess quality of pulses, skin color and temperature  - Assess for signs of decreased coronary artery perfusion  - Instruct patient to report change in severity of symptoms  Outcome: Progressing     Problem: RESPIRATORY - ADULT  Goal: Achieves optimal ventilation and oxygenation  Description  INTERVENTIONS:  - Assess for changes in respiratory status  - Assess for changes in mentation and behavior  - Position to facilitate oxygenation and minimize respiratory effort  - Oxygen administered by appropriate delivery if ordered  - Initiate smoking cessation education as indicated  - Encourage broncho-pulmonary hygiene including cough, deep breathe, Incentive Spirometry  - Assess the need for suctioning and aspirate as needed  - Assess and instruct to report SOB or any respiratory difficulty  - Respiratory Therapy support as indicated  Outcome: Progressing     Problem: METABOLIC, FLUID AND ELECTROLYTES - ADULT  Goal: Electrolytes maintained within normal limits  Description  INTERVENTIONS:  - Monitor labs and assess patient for signs and symptoms of electrolyte imbalances  - Administer electrolyte replacement as ordered  - Monitor response to electrolyte replacements, including repeat lab results as appropriate  - Instruct patient on fluid and nutrition as appropriate  Outcome: Progressing     Problem: PAIN - ADULT  Goal: Verbalizes/displays adequate comfort level or baseline comfort level  Description  Interventions:  - Encourage patient to monitor pain and request assistance  - Assess pain using appropriate pain scale  - Administer analgesics based on type and severity of pain and evaluate response  - Implement non-pharmacological measures as appropriate and evaluate response  - Consider cultural and social influences on pain and pain management  - Notify physician/advanced practitioner if interventions unsuccessful or patient reports new pain  Outcome: Progressing     Problem: INFECTION - ADULT  Goal: Absence or prevention of progression during hospitalization  Description  INTERVENTIONS:  - Assess and monitor for signs and symptoms of infection  - Monitor lab/diagnostic results  - Monitor all insertion sites, i e  indwelling lines, tubes, and drains  - Administer medications as ordered  - Instruct and encourage patient and family to use good hand hygiene technique  - Identify and instruct in appropriate isolation precautions for identified infection/condition   Outcome: Progressing     Problem: SAFETY ADULT  Goal: Patient will remain free of falls  Description  INTERVENTIONS:  - Assess patient frequently for physical needs  -  Identify cognitive and physical deficits and behaviors that affect risk of falls    -  [place] fall precautions as indicated by assessment   - Educate patient/family on patient safety including physical limitations  - Instruct patient to call for assistance with activity based on assessment  - Modify environment to reduce risk of injury  - Consider OT/PT consult to assist with strengthening/mobility  Outcome: Progressing  Goal: Maintain or return to baseline ADL function  Description  INTERVENTIONS:  -  Assess patient's ability to carry out ADLs; assess patient's baseline for ADL function and identify physical deficits which impact ability to perform ADLs (bathing, care of mouth/teeth, toileting, grooming, dressing, etc )  - Assess/evaluate cause of self-care deficits   - Assess range of motion  - Assess patient's mobility; develop plan if impaired  - Assess patient's need for assistive devices and provide as appropriate  - Encourage maximum independence but intervene and supervise when necessary  - Involve family in performance of ADLs  - Assess for home care needs following discharge   - Consider OT consult to assist with ADL evaluation and planning for discharge  - Provide patient education as appropriate  Outcome: Progressing     Problem: DISCHARGE PLANNING  Goal: Discharge to home or other facility with appropriate resources  Description  INTERVENTIONS:  - Identify barriers to discharge w/patient and caregiver  - Arrange for needed discharge resources and transportation as appropriate  - Identify discharge learning needs (meds, wound care, etc )  - Refer to Case Management Department for coordinating discharge planning if the patient needs post-hospital services based on physician/advanced practitioner order or complex needs related to functional status, cognitive ability, or social support system   Outcome: Progressing     Problem: Knowledge Deficit  Goal: Patient/family/caregiver demonstrates understanding of disease process, treatment plan, medications, and discharge instructions  Description  Complete learning assessment and assess knowledge base    Interventions:  - Provide teaching at level of understanding  - Provide teaching via preferred learning methods  Outcome: Progressing

## 2020-06-26 NOTE — ED PROVIDER NOTES
EMERGENCY DEPARTMENT ENCOUNTER NOTE  ? CHIEF COMPLAINT  Chief Complaint   Patient presents with    Shortness of Breath     Shortness of breath for the last 2 weeks with dry cough  HPI  Nellie Nice is a 62 y o  male with PMH of Hypertension, asthma, back pain, presenting with shortness of breath over the past 2 weeks  Patient feels that this is similar to prior exacerbations of asthma  He has had a dry nonproductive cough  He has not had any fevers or chills  He has, however, had some decreased appetite and feels dehydrated  No nausea or vomiting  No abdominal pain  No diarrhea  Patient denies chest pain  He does have inhalers at home and has been using albuterol inhaler, however, it is not helping  Patient reports that he is quitting smoking, still smokes some cigarettes  Denies exposure to COVID-19  However, he does go out for food shopping, a task he splits with his wife  Patient does report some leg swelling  He has never required ICU admission for asthma  REVIEW OF SYSTEMS  Constitutional: ?Denies fevers, chills  Eyes: ?Denies changes in vision  ENT: Denies earache or sore throat  CV: Denies chest pain   Resp: As above  GI: ?Denies abdominal pain, vomiting, or diarrhea   Skin: No new rashes  Neuro: No headaches  Ten systems were reviewed otherwise were unremarkable    PAST MEDICAL HISTORY  Past Medical History:   Diagnosis Date    Asthma     Back pain     Carpal tunnel syndrome     Hypertension     Knee pain     Neck pain        SURGICAL HISTORY  Past Surgical History:   Procedure Laterality Date    CARPAL TUNNEL RELEASE      HAND SURGERY         FAMILY HISTORY  Family History   Problem Relation Age of Onset    Cancer Mother     Hypertension Mother     Hypertension Father     Gout Father     Heart attack Father     Heart disease Father     Lung cancer Sister         CURRENT MEDICATIONS  No current facility-administered medications on file prior to encounter  Current Outpatient Medications on File Prior to Encounter   Medication Sig    cyclobenzaprine (FLEXERIL) 10 mg tablet Take 1 tablet (10 mg total) by mouth 2 (two) times a day as needed for muscle spasms    diclofenac (VOLTAREN) 75 mg EC tablet Take 1 tablet (75 mg total) by mouth 2 (two) times a day    irbesartan-hydrochlorothiazide (AVALIDE) 300-12 5 MG per tablet irbesartan 300 mg-hydrochlorothiazide 12 5 mg tablet    meloxicam (MOBIC) 15 mg tablet meloxicam 15 mg tablet    methocarbamol (ROBAXIN) 750 mg tablet Take 1 tablet (750 mg total) by mouth 3 (three) times a day as needed for muscle spasms    pregabalin (LYRICA) 200 MG capsule Lyrica 200 mg capsule       ALLERGIES  No Known Allergies    SOCIAL HISTORY  Social History     Socioeconomic History    Marital status: /Civil Union     Spouse name: None    Number of children: None    Years of education: None    Highest education level: None   Occupational History    None   Social Needs    Financial resource strain: None    Food insecurity:     Worry: None     Inability: None    Transportation needs:     Medical: None     Non-medical: None   Tobacco Use    Smoking status: Current Every Day Smoker     Packs/day: 0 50     Years: 36 00     Pack years: 18 00     Types: Cigarettes    Smokeless tobacco: Never Used   Substance and Sexual Activity    Alcohol use: Yes     Comment: occasional    Drug use: Yes     Types: Marijuana     Comment: periodically/on ocassion    Sexual activity: None   Lifestyle    Physical activity:     Days per week: None     Minutes per session: None    Stress: None   Relationships    Social connections:     Talks on phone: None     Gets together: None     Attends Yazidi service: None     Active member of club or organization: None     Attends meetings of clubs or organizations: None     Relationship status: None    Intimate partner violence:     Fear of current or ex partner: None     Emotionally abused: None Physically abused: None     Forced sexual activity: None   Other Topics Concern    None   Social History Narrative    None       PHYSICAL EXAM    BP (!) 150/112 (BP Location: Right arm)   Pulse (!) 120   Temp (!) 97 3 °F (36 3 °C) (Temporal)   Resp 21   Wt (!) 152 kg (334 lb 10 5 oz)   SpO2 98%   BMI 48 02 kg/m²   Vital signs and nursing notes reviewed    CONSTITUTIONAL: male appearing stated age resting in bed, mildly tachypneic, but able to speak in full sentences, nontoxic appearing  HEENT: atraumatic, normocephalic  Sclera anicteric, conjunctiva are not injected  Moist oral mucosa  CARDIOVASCULAR/CHEST:  Tachycardic, regular, unable to appreciate murmurs secondary to breathing and body habitus  2+ radial pulses  PULMONARY:  Increased work of breathing, diminished air entry throughout, sparse end-expiratory wheezes present  ABDOMEN: non-distended  BS present, normoactive  Non-tender  MSK: moves all extremities, no deformities, trace bilateral peripheral edema  Calves symmetric  NEURO: Awake, alert, and oriented x 3  Face symmetric  Moves all extremities spontaneously  No focal neurologic deficits  SKIN: Warm, appears well-perfused  MENTAL STATUS: Normal affect  ? LABS AND TESTS    Results Reviewed     Procedure Component Value Units Date/Time    D-dimer, quantitative [[id number]]  (Abnormal) Collected:  06/26/20 0816    Lab Status:  Final result Specimen:  Blood Updated:  06/26/20 0943     D-Dimer, Quant 2 06 ug/ml FEU     Novel Coronavirus (Covid-19),PCR [facility] [[id number]]  (Normal) Collected:  06/26/20 0818    Lab Status:  Final result Specimen:  Nares from Nose Updated:  06/26/20 0929     SARS-CoV-2 Negative    Narrative:        The specimen collection materials, transport medium, and/or testing methodology utilized in the production of these test results have been proven to be reliable in a limited validation with an abbreviated program under the Emergency Utilization Authorization provided by the FDA  Testing reported as "Presumptive positive" will be confirmed with secondary testing with a reference laboratory to ensure result accuracy  Clinical caution and judgement should be used with the interpretation of these results with consideration of the clinical impression and other laboratory testing  Testing reported as "Positive" or "Negative" has been proven to be accurate according to standard laboratory validation requirements  All testing is performed with control materials showing appropriate reactivity at standard intervals        Basic metabolic panel [[id number]]  (Abnormal) Collected:  06/26/20 0816    Lab Status:  Final result Specimen:  Blood from Arm, Left Updated:  06/26/20 0850     Sodium 136 mmol/L      Potassium 4 1 mmol/L      Chloride 102 mmol/L      CO2 23 mmol/L      ANION GAP 11 mmol/L      BUN 21 mg/dL      Creatinine 1 34 mg/dL      Glucose 178 mg/dL      Calcium 8 1 mg/dL      eGFR 68 ml/min/1 73sq m     Narrative:       [place] guidelines for Chronic Kidney Disease (CKD):     Stage 1 with normal or high GFR (GFR > 90 mL/min/1 73 square meters)    Stage 2 Mild CKD (GFR = 60-89 mL/min/1 73 square meters)    Stage 3A Moderate CKD (GFR = 45-59 mL/min/1 73 square meters)    Stage 3B Moderate CKD (GFR = 30-44 mL/min/1 73 square meters)    Stage 4 Severe CKD (GFR = 15-29 mL/min/1 73 square meters)    Stage 5 End Stage CKD (GFR <15 mL/min/1 73 square meters)  Note: GFR calculation is accurate only with a steady state creatinine    NT-BNP PRO [[id number]]  (Abnormal) Collected:  06/26/20 0816    Lab Status:  Final result Specimen:  Blood from Arm, Left Updated:  06/26/20 0850     NT-proBNP 4,410 pg/mL     Troponin I [[id number]]  (Abnormal) Collected:  06/26/20 0816    Lab Status:  Final result Specimen:  Blood from Arm, Left Updated:  06/26/20 0845     Troponin I 0 09 ng/mL     CBC and differential [[id number]]  (Abnormal) Collected:  06/26/20 6588    Lab Status:  Final result Specimen:  Blood from Arm, Left Updated:  06/26/20 0825     WBC 11 41 Thousand/uL      RBC 4 81 Million/uL      Hemoglobin 14 1 g/dL      Hematocrit 44 0 %      MCV 92 fL      MCH 29 3 pg      MCHC 32 0 g/dL      RDW 16 2 %      MPV 10 0 fL      Platelets 664 Thousands/uL      nRBC 0 /100 WBCs      Neutrophils Relative 63 %      Immat GRANS % 1 %      Lymphocytes Relative 27 %      Monocytes Relative 7 %      Eosinophils Relative 2 %      Basophils Relative 0 %      Neutrophils Absolute 7 18 Thousands/µL      Immature Grans Absolute 0 07 Thousand/uL      Lymphocytes Absolute 3 13 Thousands/µL      Monocytes Absolute 0 77 Thousand/µL      Eosinophils Absolute 0 22 Thousand/µL      Basophils Absolute 0 04 Thousands/µL     [place] draw [[id number]] Updated:  06/26/20 0823    Lab Status: In process Specimen:  Blood     Narrative: The following orders were created for panel order [place] draw  Procedure                               Abnormality         Status                     ---------                               -----------         ------                     Ania Keys [facility] on [facility][[id number]]                           In process                   Please view results for these tests on the individual orders  CTA ED chest PE Study   Final Result by Socorro Multani MD (06/26 1043)         1  No evidence for acute pulmonary embolism or other acute intrathoracic abnormality  2   Dilated central pulmonary arterial vasculature suggesting pulmonary arterial hypertension  Reflux of contrast into the inferior vena cava and hepatic veins is consistent with elevated right heart pressures  3   Cardiomegaly  Workstation performed: [id number]         XR chest 1 view portable   Final Result by Tyler Araiza MD (06/26 0880)      No acute cardiopulmonary disease              Workstation performed: [id number]             ED [facility] ECG 12 Lead Documentation Only  Date/Time: 6/26/2020 7:55 AM  Performed by: Dago Forte MD  Authorized by: Dago Forte MD     Comments:      Sinus tachycardia, ventricular rate 114, RI interval 138, QRS 74, , normal axis, T-wave inversions are present in anterior precordial leads, T-wave flattening is present in lateral precordial leads and high lateral leads, Q-waves in inferior lead 3 suggest age-indeterminate inferior infarct  No STEMI  Wondering baseline makes interpretation challenging  No prior EKG available for my review  Medications   sodium chloride 0 9 % bolus 1,000 mL (1,000 mL Intravenous New Bag 6/26/20 0833)   magnesium sulfate 2 g/50 mL IVPB (premix) 2 g (2 g Intravenous New Bag 6/26/20 0841)   ipratropium-albuterol (DUO-NEB) 0 5-2 5 mg/3 mL inhalation solution 3 mL (3 mL Nebulization Given 6/26/20 0843)   methylPREDNISolone sodium succinate (Solu-MEDROL) injection 125 mg (125 mg Intravenous Given 6/26/20 0036)     20-year-old male presenting with shortness of breath x2 weeks  Vital signs reviewed, afebrile, tachycardic, tachypneic, saturating 92% and above on room air  Differential diagnosis includes asthma exacerbation, pneumonia, COVID-19 infection, volume overload due to CHF, acute coronary syndrome/myocardial infarction, PE, dissection, pneumothorax, versus another etiology of symptoms  DuoNeb and Solu-Medrol administered given for air entry and wheezing  Magnesium administered for the same  Patient reports feeling dehydrated, fluid bolus administered  EKG obtained, has several abnormalities including T-wave abnormalities and possible age indeterminate inferior infarct  Patient has no chest pain at present  Labs obtained, revealing mild LUCY with creatinine of 1 34, BNP is elevated at 4410, as is troponin, 0 09  White blood cell count is with mild leukocytosis    Chest x-ray to my review is with cardiomegaly, without pleural effusions, without infiltrates  COVID-19 test is negative  Yu Hurst PE criteria places patient at moderate risk for PE, D-dimer obtained, is elevated at 2 06  CT chest PE obtained, revealing no pulmonary embolism, but revealing cardiomegaly, as well as dilated central pulmonary arteries concerning for elevated right heart pressures  Case discussed with [facility] and patient will be admitted to [facility] for further evaluation and care  Lasix 20 mg IV ordered for diuresis, particularly in setting of administration of 1 L of normal saline intravenously earlier during patient's ED course  MDM  Number of Diagnoses or Management Options  Dyspnea, unspecified type: new and requires workup  Elevated troponin: new and requires workup     Amount and/or Complexity of Data Reviewed  Clinical lab tests: ordered and reviewed  Tests in the radiology section of CPT®: ordered and reviewed  Tests in the medicine section of CPT®: ordered and reviewed  Review and summarize past medical records: yes  Discuss the patient with other providers: yes (SLIM)  Independent visualization of images, tracings, or specimens: yes    Risk of Complications, Morbidity, and/or Mortality  Presenting problems: high  Diagnostic procedures: high  Management options: high    Patient Progress  Patient progress: stable      CLINICAL IMPRESSION  Final diagnoses:   Dyspnea, unspecified type   Elevated troponin       DISPOSITION  Time reflects when diagnosis was documented in both MDM as applicable and the Disposition within this note     Time User Action Codes Description Comment    6/26/2020 10:35 AM Teresa Leslie Add [R06 [id number]] Dyspnea, unspecified type     6/26/2020 10:35 AM Teresa Leslie Add [R79 [id number]] Elevated troponin       ED Disposition     ED Disposition Condition Date/Time Comment    Admit Stable Fri Jun 26, 2020 10:35 AM Case was discussed with Dr Raulito Salinas and the patient's admission status was agreed to be Admission Status: inpatient status to the service of Dr Raulito Salinas   Follow-up Information    None         DISCHARGE MEDICATIONS  Patient's Medications   Discharge Prescriptions    No medications on file         This note has been generated using a voice recognition software  There may be typographic, grammatic, or word substitution errors that have escaped editorial review       Skye Giles MD  06/26/20 7147

## 2020-06-27 PROBLEM — I50.41 ACUTE COMBINED SYSTOLIC AND DIASTOLIC CONGESTIVE HEART FAILURE (HCC): Status: ACTIVE | Noted: 2020-06-26

## 2020-06-27 PROBLEM — I50.21 ACUTE SYSTOLIC CONGESTIVE HEART FAILURE (HCC): Status: ACTIVE | Noted: 2020-06-26

## 2020-06-27 LAB
ANION GAP SERPL CALCULATED.3IONS-SCNC: 12 MMOL/L (ref 4–13)
BASOPHILS # BLD AUTO: 0.03 THOUSANDS/ΜL (ref 0–0.1)
BASOPHILS NFR BLD AUTO: 0 % (ref 0–1)
BUN SERPL-MCNC: 23 MG/DL (ref 5–25)
CALCIUM SERPL-MCNC: 8.1 MG/DL (ref 8.3–10.1)
CHLORIDE SERPL-SCNC: 102 MMOL/L (ref 100–108)
CO2 SERPL-SCNC: 24 MMOL/L (ref 21–32)
CREAT SERPL-MCNC: 1.44 MG/DL (ref 0.6–1.3)
EOSINOPHIL # BLD AUTO: 0 THOUSAND/ΜL (ref 0–0.61)
EOSINOPHIL NFR BLD AUTO: 0 % (ref 0–6)
ERYTHROCYTE [DISTWIDTH] IN BLOOD BY AUTOMATED COUNT: 15.9 % (ref 11.6–15.1)
GFR SERPL CREATININE-BSD FRML MDRD: 62 ML/MIN/1.73SQ M
GLUCOSE SERPL-MCNC: 148 MG/DL (ref 65–140)
HCT VFR BLD AUTO: 41.6 % (ref 36.5–49.3)
HGB BLD-MCNC: 13.5 G/DL (ref 12–17)
IMM GRANULOCYTES # BLD AUTO: 0.11 THOUSAND/UL (ref 0–0.2)
IMM GRANULOCYTES NFR BLD AUTO: 1 % (ref 0–2)
LYMPHOCYTES # BLD AUTO: 1.68 THOUSANDS/ΜL (ref 0.6–4.47)
LYMPHOCYTES NFR BLD AUTO: 10 % (ref 14–44)
MCH RBC QN AUTO: 29.5 PG (ref 26.8–34.3)
MCHC RBC AUTO-ENTMCNC: 32.5 G/DL (ref 31.4–37.4)
MCV RBC AUTO: 91 FL (ref 82–98)
MONOCYTES # BLD AUTO: 1.17 THOUSAND/ΜL (ref 0.17–1.22)
MONOCYTES NFR BLD AUTO: 7 % (ref 4–12)
NEUTROPHILS # BLD AUTO: 13.19 THOUSANDS/ΜL (ref 1.85–7.62)
NEUTS SEG NFR BLD AUTO: 82 % (ref 43–75)
NRBC BLD AUTO-RTO: 0 /100 WBCS
PLATELET # BLD AUTO: 277 THOUSANDS/UL (ref 149–390)
PMV BLD AUTO: 10.6 FL (ref 8.9–12.7)
POTASSIUM SERPL-SCNC: 4 MMOL/L (ref 3.5–5.3)
RBC # BLD AUTO: 4.57 MILLION/UL (ref 3.88–5.62)
SODIUM SERPL-SCNC: 138 MMOL/L (ref 136–145)
WBC # BLD AUTO: 16.18 THOUSAND/UL (ref 4.31–10.16)

## 2020-06-27 PROCEDURE — 94640 AIRWAY INHALATION TREATMENT: CPT

## 2020-06-27 PROCEDURE — 85025 COMPLETE CBC W/AUTO DIFF WBC: CPT | Performed by: FAMILY MEDICINE

## 2020-06-27 PROCEDURE — 80048 BASIC METABOLIC PNL TOTAL CA: CPT | Performed by: FAMILY MEDICINE

## 2020-06-27 PROCEDURE — 94760 N-INVAS EAR/PLS OXIMETRY 1: CPT

## 2020-06-27 PROCEDURE — 99233 SBSQ HOSP IP/OBS HIGH 50: CPT | Performed by: FAMILY MEDICINE

## 2020-06-27 RX ORDER — ISOSORBIDE DINITRATE 10 MG/1
10 TABLET ORAL
Status: DISCONTINUED | OUTPATIENT
Start: 2020-06-27 | End: 2020-06-29 | Stop reason: HOSPADM

## 2020-06-27 RX ORDER — HYDRALAZINE HYDROCHLORIDE 10 MG/1
10 TABLET, FILM COATED ORAL EVERY 8 HOURS SCHEDULED
Status: DISCONTINUED | OUTPATIENT
Start: 2020-06-27 | End: 2020-06-29 | Stop reason: HOSPADM

## 2020-06-27 RX ORDER — ASPIRIN 81 MG/1
81 TABLET ORAL DAILY
Status: DISCONTINUED | OUTPATIENT
Start: 2020-06-27 | End: 2020-06-29 | Stop reason: HOSPADM

## 2020-06-27 RX ORDER — ATORVASTATIN CALCIUM 40 MG/1
40 TABLET, FILM COATED ORAL
Status: DISCONTINUED | OUTPATIENT
Start: 2020-06-27 | End: 2020-06-29 | Stop reason: HOSPADM

## 2020-06-27 RX ADMIN — METOPROLOL TARTRATE 25 MG: 25 TABLET, FILM COATED ORAL at 21:25

## 2020-06-27 RX ADMIN — FUROSEMIDE 40 MG: 10 INJECTION, SOLUTION INTRAMUSCULAR; INTRAVENOUS at 17:27

## 2020-06-27 RX ADMIN — ISOSORBIDE DINITRATE 10 MG: 10 TABLET ORAL at 17:27

## 2020-06-27 RX ADMIN — PREGABALIN 200 MG: 50 CAPSULE ORAL at 09:01

## 2020-06-27 RX ADMIN — METOPROLOL TARTRATE 25 MG: 25 TABLET, FILM COATED ORAL at 09:01

## 2020-06-27 RX ADMIN — IPRATROPIUM BROMIDE 0.5 MG: 0.5 SOLUTION RESPIRATORY (INHALATION) at 20:04

## 2020-06-27 RX ADMIN — LEVALBUTEROL 1.25 MG: 1.25 SOLUTION, CONCENTRATE RESPIRATORY (INHALATION) at 15:03

## 2020-06-27 RX ADMIN — HYDRALAZINE HYDROCHLORIDE 10 MG: 10 TABLET, FILM COATED ORAL at 22:34

## 2020-06-27 RX ADMIN — ATORVASTATIN CALCIUM 40 MG: 40 TABLET, FILM COATED ORAL at 17:27

## 2020-06-27 RX ADMIN — FUROSEMIDE 40 MG: 10 INJECTION, SOLUTION INTRAMUSCULAR; INTRAVENOUS at 09:01

## 2020-06-27 RX ADMIN — LEVALBUTEROL 1.25 MG: 1.25 SOLUTION, CONCENTRATE RESPIRATORY (INHALATION) at 20:04

## 2020-06-27 RX ADMIN — HYDRALAZINE HYDROCHLORIDE 10 MG: 10 TABLET, FILM COATED ORAL at 14:48

## 2020-06-27 RX ADMIN — IPRATROPIUM BROMIDE 0.5 MG: 0.5 SOLUTION RESPIRATORY (INHALATION) at 08:16

## 2020-06-27 RX ADMIN — ENOXAPARIN SODIUM 40 MG: 40 INJECTION SUBCUTANEOUS at 09:00

## 2020-06-27 RX ADMIN — ASPIRIN 81 MG: 81 TABLET, COATED ORAL at 14:48

## 2020-06-27 RX ADMIN — LEVALBUTEROL 1.25 MG: 1.25 SOLUTION, CONCENTRATE RESPIRATORY (INHALATION) at 08:16

## 2020-06-27 RX ADMIN — IPRATROPIUM BROMIDE 0.5 MG: 0.5 SOLUTION RESPIRATORY (INHALATION) at 15:03

## 2020-06-27 NOTE — NURSING NOTE
Patient urine output not recorded before 6/26/20 at 2000  Patient reports "I've been peeing a lot, at least ten times " Patient given urinal and the rational for recording urine output was explained

## 2020-06-27 NOTE — PLAN OF CARE
Problem: Potential for Falls  Goal: Patient will remain free of falls  Description  INTERVENTIONS:  - Assess patient frequently for physical needs  -  Identify cognitive and physical deficits and behaviors that affect risk of falls    -  [place] fall precautions as indicated by assessment   - Educate patient/family on patient safety including physical limitations  - Instruct patient to call for assistance with activity based on assessment  - Modify environment to reduce risk of injury  - Consider OT/PT consult to assist with strengthening/mobility  Outcome: Progressing     Problem: CARDIOVASCULAR - ADULT  Goal: Maintains optimal cardiac output and hemodynamic stability  Description  INTERVENTIONS:  - Monitor I/O, vital signs and rhythm  - Monitor for S/S and trends of decreased cardiac output  - Administer and titrate ordered vasoactive medications to optimize hemodynamic stability  - Assess quality of pulses, skin color and temperature  - Assess for signs of decreased coronary artery perfusion  - Instruct patient to report change in severity of symptoms  Outcome: Progressing     Problem: RESPIRATORY - ADULT  Goal: Achieves optimal ventilation and oxygenation  Description  INTERVENTIONS:  - Assess for changes in respiratory status  - Assess for changes in mentation and behavior  - Position to facilitate oxygenation and minimize respiratory effort  - Oxygen administered by appropriate delivery if ordered  - Initiate smoking cessation education as indicated  - Encourage broncho-pulmonary hygiene including cough, deep breathe, Incentive Spirometry  - Assess the need for suctioning and aspirate as needed  - Assess and instruct to report SOB or any respiratory difficulty  - Respiratory Therapy support as indicated  Outcome: Progressing     Problem: METABOLIC, FLUID AND ELECTROLYTES - ADULT  Goal: Electrolytes maintained within normal limits  Description  INTERVENTIONS:  - Monitor labs and assess patient for signs and symptoms of electrolyte imbalances  - Administer electrolyte replacement as ordered  - Monitor response to electrolyte replacements, including repeat lab results as appropriate  - Instruct patient on fluid and nutrition as appropriate  Outcome: Progressing  Goal: Fluid balance maintained  Description  INTERVENTIONS:  - Monitor labs   - Monitor I/O and WT  - Instruct patient on fluid and nutrition as appropriate  - Assess for signs & symptoms of volume excess or deficit  Outcome: Progressing     Problem: PAIN - ADULT  Goal: Verbalizes/displays adequate comfort level or baseline comfort level  Description  Interventions:  - Encourage patient to monitor pain and request assistance  - Assess pain using appropriate pain scale  - Administer analgesics based on type and severity of pain and evaluate response  - Implement non-pharmacological measures as appropriate and evaluate response  - Consider cultural and social influences on pain and pain management  - Notify physician/advanced practitioner if interventions unsuccessful or patient reports new pain  Outcome: Progressing     Problem: INFECTION - ADULT  Goal: Absence or prevention of progression during hospitalization  Description  INTERVENTIONS:  - Assess and monitor for signs and symptoms of infection  - Monitor lab/diagnostic results  - Monitor all insertion sites, i e  indwelling lines, tubes, and drains  - Administer medications as ordered  - Instruct and encourage patient and family to use good hand hygiene technique  - Identify and instruct in appropriate isolation precautions for identified infection/condition   Outcome: Progressing     Problem: SAFETY ADULT  Goal: Patient will remain free of falls  Description  INTERVENTIONS:  - Assess patient frequently for physical needs  -  Identify cognitive and physical deficits and behaviors that affect risk of falls    -  [place] fall precautions as indicated by assessment   - Educate patient/family on patient safety including physical limitations  - Instruct patient to call for assistance with activity based on assessment  - Modify environment to reduce risk of injury  - Consider OT/PT consult to assist with strengthening/mobility  Outcome: Progressing  Goal: Maintain or return to baseline ADL function  Description  INTERVENTIONS:  -  Assess patient's ability to carry out ADLs; assess patient's baseline for ADL function and identify physical deficits which impact ability to perform ADLs (bathing, care of mouth/teeth, toileting, grooming, dressing, etc )  - Assess/evaluate cause of self-care deficits   - Assess range of motion  - Assess patient's mobility; develop plan if impaired  - Assess patient's need for assistive devices and provide as appropriate  - Encourage maximum independence but intervene and supervise when necessary  - Involve family in performance of ADLs  - Assess for home care needs following discharge   - Consider OT consult to assist with ADL evaluation and planning for discharge  - Provide patient education as appropriate  Outcome: Progressing     Problem: DISCHARGE PLANNING  Goal: Discharge to home or other facility with appropriate resources  Description  INTERVENTIONS:  - Identify barriers to discharge w/patient and caregiver  - Arrange for needed discharge resources and transportation as appropriate  - Identify discharge learning needs (meds, wound care, etc )  - Refer to Case Management Department for coordinating discharge planning if the patient needs post-hospital services based on physician/advanced practitioner order or complex needs related to functional status, cognitive ability, or social support system   Outcome: Progressing     Problem: Knowledge Deficit  Goal: Patient/family/caregiver demonstrates understanding of disease process, treatment plan, medications, and discharge instructions  Description  Complete learning assessment and assess knowledge base    Interventions:  - Provide teaching at level of understanding  - Provide teaching via preferred learning methods  Outcome: Progressing

## 2020-06-27 NOTE — RESPIRATORY THERAPY NOTE
Today pt stated he will be discharged with nebulizer and inhaler  Inhaler instructions with spacer technique given to pt for good therapeutic results

## 2020-06-27 NOTE — PROGRESS NOTES
Progress Note - Chance Abernathy 1965, 62 y o  male MRN: [id number]    Unit/Bed#: 419-01 Encounter: [id number]    Primary Care Provider: No primary care provider on file  Date and time admitted to hospital: 6/26/2020  7:48 AM        * Acute combined systolic and diastolic congestive heart failure (HCC)  Assessment & Plan  Wt Readings from Last 3 Encounters:   06/27/20 (!) 147 kg (324 lb 11 8 oz)   10/28/19 (!) 153 kg (337 lb)   10/18/19 (!) 154 kg (339 lb 9 6 oz)       Patient presenting with orthopnea, pulmonary edema, elevated proBNP, cardiomegaly, long-standing HTN  Echo reveals EF of 20-25%, diffuse hypokinesis, grade 2 diastolic dysfunction  - troponin elevated and flat at 0 09, no chest pain at this time  - continue IV Lasix  - discussed with Cardiology on-call  Cardiology consult if still hospitalized Monday  - isosorbide, hydralazine and beta-blocker added to therapy  No ACE-inhibitor/ARB or spironolactone due to LUCY  - Monitor daily weights/I's/O's  - 2gm Na diet      Essential hypertension  Assessment & Plan  Uncontrolled  Arb/HCTZ held due to elevated Cr at 1 3 with baseline of 1 09  IV Lasix for acute CHF  Added BB, Hydralazine and isosorbide in light of reduced EF - no ACEi/ARB or spironolactone due to LUCY    Morbid obesity ([facility] [address] )  Assessment & Plan  Diet modifications/Nutrition consult     GERD (gastroesophageal reflux disease)  Assessment & Plan  Not on PPI therapy     [facility]  Assessment & Plan  Does not appear in exacerbation  Respiratory protocol       VTE Pharmacologic Prophylaxis:   Pharmacologic: Enoxaparin (Lovenox)  Mechanical VTE Prophylaxis in Place: Yes    Patient Centered Rounds: I have performed bedside rounds with nursing staff today  Discussions with Specialists or Other Care Team Provider: Cardiology on call    Education and Discussions with Family / Patient: patient's sister Cyndi Burnett    Time Spent for Care: 45 minutes    More than 50% of total time spent on counseling and coordination of care as described above  Current Length of Stay: 1 day(s)    Current Patient Status: Inpatient   Certification Statement: The patient will continue to require additional inpatient hospital stay due to IV Lasix, close monitoring     Discharge Plan: TBD    Code Status: Level 1 - Full Code      Subjective:   Patient seen and examined  He reports shortness of breath, denies chest pain  On room air with normal oxygen saturations  No overnight events  Objective:     Vitals:   Temp (24hrs), Av 5 °F (35 8 °C), Min:96 5 °F (35 8 °C), Max:96 5 °F (35 8 °C)    Temp:  [96 5 °F (35 8 °C)] 96 5 °F (35 8 °C)  HR:  [] 111  Resp:  [18-27] 18  BP: (133-182)/() 133/82  SpO2:  [93 %-100 %] 95 %  Body mass index is 47 96 kg/m²  Input and Output Summary (last 24 hours): Intake/Output Summary (Last 24 hours) at 2020 1316  Last data filed at 2020 1236  Gross per 24 hour   Intake 1380 ml   Output 700 ml   Net 680 ml       Physical Exam:     Physical Exam   Constitutional: He is oriented to person, place, and time  No distress  HENT:   Head: Normocephalic and atraumatic  Eyes: Conjunctivae are normal    Neck: No JVD present  Cardiovascular: Regular rhythm  Tachycardia present  No murmur heard  Pulmonary/Chest: Effort normal  No respiratory distress  He has decreased breath sounds  He has no wheezes  He has no rales  Abdominal: Soft  He exhibits no distension  There is no tenderness  There is no guarding  Musculoskeletal: He exhibits no edema  Neurological: He is alert and oriented to person, place, and time  Skin: Skin is warm and dry  Psychiatric: He has a normal mood and affect         Additional Data:     Labs:    Results from last 7 days   Lab Units 20  0443   WBC Thousand/uL 16 18*   HEMOGLOBIN g/dL 13 5   HEMATOCRIT % 41 6   PLATELETS Thousands/uL 277   NEUTROS PCT % 82*   LYMPHS PCT % 10*   MONOS PCT % 7   EOS PCT % 0     Results from last 7 days   Lab Units 06/27/20  0443   SODIUM mmol/L 138   POTASSIUM mmol/L 4 0   CHLORIDE mmol/L 102   CO2 mmol/L 24   BUN mg/dL 23   CREATININE mg/dL 1 44*   ANION GAP mmol/L 12   CALCIUM mg/dL 8 1*   GLUCOSE RANDOM mg/dL 148*             Results from last 7 days   Lab Units 06/26/20  0816   HEMOGLOBIN A1C % 6 2*               * I Have Reviewed All Lab Data Listed Above  * Additional Pertinent Lab Tests Reviewed: [place] [address] Admission Reviewed    Imaging:    Imaging/Reports Reviewed Today Include: 2D Echo      Recent Cultures (last 7 days):           Last 24 Hours Medication List:     Current Facility-Administered Medications:  acetaminophen 650 mg Oral Q6H PRN Justa Bsutos, MD   albuterol 2 5 mg Nebulization Q6H PRN Maged Love MD   aspirin 81 mg Oral Daily Justa Bustos, MD   atorvastatin 40 mg Oral Daily With Catalino Cardona MD   cyclobenzaprine 10 mg Oral BID PRN Justa Bustos, MD   enoxaparin 40 mg Subcutaneous Daily Justa Bustos MD   furosemide 40 mg Intravenous BID (diuretic) Justa Bustos MD   hydrALAZINE 10 mg Intravenous Q6H PRN Justa Bustos MD   hydrALAZINE 10 mg Oral Q8H [address] Raina Hodge MD   ipratropium 0 5 mg Nebulization TID Maged Love MD   isosorbide dinitrate 10 mg Oral TID after meals Justa Bustos MD   levalbuterol 1 25 mg Nebulization TID Maged Love MD   metoprolol tartrate 25 mg Oral Q12H [address] Justa Bustos MD   nicotine 1 patch Transdermal Daily Justa Bustos MD   ondansetron 4 mg Intravenous Q6H PRN Justa Bustos MD   oxyCODONE 5 mg Oral Q4H PRN Justa Bustos MD   pregabalin 200 mg Oral Daily Justa Bustos MD        Today, Patient Was Seen By: Galen Jackson MD    ** Please Note: Dictation voice to text software may have been used in the creation of this document   **

## 2020-06-27 NOTE — ASSESSMENT & PLAN NOTE
Uncontrolled  Arb/HCTZ held due to elevated Cr at 1 3 with baseline of 1 09  IV Lasix for acute CHF  Added BB, Hydralazine and isosorbide in light of reduced EF - no ACEi/ARB or spironolactone due to LUCY

## 2020-06-27 NOTE — ASSESSMENT & PLAN NOTE
Wt Readings from Last 3 Encounters:   06/27/20 (!) 147 kg (324 lb 11 8 oz)   10/28/19 (!) 153 kg (337 lb)   10/18/19 (!) 154 kg (339 lb 9 6 oz)       Patient presenting with orthopnea, pulmonary edema, elevated proBNP, cardiomegaly, long-standing HTN  Echo reveals EF of 20-25%, diffuse hypokinesis, grade 2 diastolic dysfunction  - troponin elevated and flat at 0 09, no chest pain at this time  - continue IV Lasix  - discussed with Cardiology on-call  Cardiology consult if still hospitalized Monday  - isosorbide, hydralazine and beta-blocker added to therapy    No ACE-inhibitor/ARB or spironolactone due to LUCY  - Monitor daily weights/I's/O's  - 2gm Na diet

## 2020-06-28 LAB
ANION GAP SERPL CALCULATED.3IONS-SCNC: 10 MMOL/L (ref 4–13)
BASOPHILS # BLD MANUAL: 0 THOUSAND/UL (ref 0–0.1)
BASOPHILS NFR MAR MANUAL: 0 % (ref 0–1)
BUN SERPL-MCNC: 24 MG/DL (ref 5–25)
CALCIUM SERPL-MCNC: 8.3 MG/DL (ref 8.3–10.1)
CHLORIDE SERPL-SCNC: 102 MMOL/L (ref 100–108)
CO2 SERPL-SCNC: 27 MMOL/L (ref 21–32)
CREAT SERPL-MCNC: 1.44 MG/DL (ref 0.6–1.3)
EOSINOPHIL # BLD MANUAL: 0 THOUSAND/UL (ref 0–0.4)
EOSINOPHIL NFR BLD MANUAL: 0 % (ref 0–6)
ERYTHROCYTE [DISTWIDTH] IN BLOOD BY AUTOMATED COUNT: 16.7 % (ref 11.6–15.1)
GFR SERPL CREATININE-BSD FRML MDRD: 62 ML/MIN/1.73SQ M
GLUCOSE SERPL-MCNC: 155 MG/DL (ref 65–140)
HCT VFR BLD AUTO: 46.6 % (ref 36.5–49.3)
HGB BLD-MCNC: 14.7 G/DL (ref 12–17)
HYPERCHROMIA BLD QL SMEAR: PRESENT
LYMPHOCYTES # BLD AUTO: 32 % (ref 14–44)
LYMPHOCYTES # BLD AUTO: 4.44 THOUSAND/UL (ref 0.6–4.47)
MCH RBC QN AUTO: 29.1 PG (ref 26.8–34.3)
MCHC RBC AUTO-ENTMCNC: 31.5 G/DL (ref 31.4–37.4)
MCV RBC AUTO: 92 FL (ref 82–98)
MONOCYTES # BLD AUTO: 0.28 THOUSAND/UL (ref 0–1.22)
MONOCYTES NFR BLD: 2 % (ref 4–12)
NEUTROPHILS # BLD MANUAL: 9.16 THOUSAND/UL (ref 1.85–7.62)
NEUTS SEG NFR BLD AUTO: 66 % (ref 43–75)
NRBC BLD AUTO-RTO: 0 /100 WBCS
PLATELET # BLD AUTO: 309 THOUSANDS/UL (ref 149–390)
PLATELET BLD QL SMEAR: ADEQUATE
PMV BLD AUTO: 9.8 FL (ref 8.9–12.7)
POTASSIUM SERPL-SCNC: 3.5 MMOL/L (ref 3.5–5.3)
RBC # BLD AUTO: 5.05 MILLION/UL (ref 3.88–5.62)
SODIUM SERPL-SCNC: 139 MMOL/L (ref 136–145)
STOMATOCYTES BLD QL SMEAR: PRESENT
TOTAL CELLS COUNTED SPEC: 100
WBC # BLD AUTO: 13.88 THOUSAND/UL (ref 4.31–10.16)

## 2020-06-28 PROCEDURE — 94640 AIRWAY INHALATION TREATMENT: CPT

## 2020-06-28 PROCEDURE — 99232 SBSQ HOSP IP/OBS MODERATE 35: CPT | Performed by: FAMILY MEDICINE

## 2020-06-28 PROCEDURE — 85007 BL SMEAR W/DIFF WBC COUNT: CPT | Performed by: FAMILY MEDICINE

## 2020-06-28 PROCEDURE — 85027 COMPLETE CBC AUTOMATED: CPT | Performed by: FAMILY MEDICINE

## 2020-06-28 PROCEDURE — 80048 BASIC METABOLIC PNL TOTAL CA: CPT | Performed by: FAMILY MEDICINE

## 2020-06-28 PROCEDURE — 94760 N-INVAS EAR/PLS OXIMETRY 1: CPT

## 2020-06-28 RX ADMIN — METOPROLOL TARTRATE 25 MG: 25 TABLET, FILM COATED ORAL at 21:44

## 2020-06-28 RX ADMIN — METOPROLOL TARTRATE 25 MG: 25 TABLET, FILM COATED ORAL at 08:57

## 2020-06-28 RX ADMIN — LEVALBUTEROL 1.25 MG: 1.25 SOLUTION, CONCENTRATE RESPIRATORY (INHALATION) at 15:12

## 2020-06-28 RX ADMIN — HYDRALAZINE HYDROCHLORIDE 10 MG: 10 TABLET, FILM COATED ORAL at 06:07

## 2020-06-28 RX ADMIN — ATORVASTATIN CALCIUM 40 MG: 40 TABLET, FILM COATED ORAL at 16:54

## 2020-06-28 RX ADMIN — PREGABALIN 200 MG: 50 CAPSULE ORAL at 08:57

## 2020-06-28 RX ADMIN — FUROSEMIDE 40 MG: 10 INJECTION, SOLUTION INTRAMUSCULAR; INTRAVENOUS at 08:57

## 2020-06-28 RX ADMIN — ENOXAPARIN SODIUM 40 MG: 40 INJECTION SUBCUTANEOUS at 08:57

## 2020-06-28 RX ADMIN — ISOSORBIDE DINITRATE 10 MG: 10 TABLET ORAL at 14:22

## 2020-06-28 RX ADMIN — LEVALBUTEROL 1.25 MG: 1.25 SOLUTION, CONCENTRATE RESPIRATORY (INHALATION) at 08:30

## 2020-06-28 RX ADMIN — HYDRALAZINE HYDROCHLORIDE 10 MG: 10 TABLET, FILM COATED ORAL at 14:23

## 2020-06-28 RX ADMIN — FUROSEMIDE 40 MG: 10 INJECTION, SOLUTION INTRAMUSCULAR; INTRAVENOUS at 16:54

## 2020-06-28 RX ADMIN — IPRATROPIUM BROMIDE 0.5 MG: 0.5 SOLUTION RESPIRATORY (INHALATION) at 15:12

## 2020-06-28 RX ADMIN — IPRATROPIUM BROMIDE 0.5 MG: 0.5 SOLUTION RESPIRATORY (INHALATION) at 08:30

## 2020-06-28 RX ADMIN — NICOTINE 1 PATCH: 14 PATCH TRANSDERMAL at 08:57

## 2020-06-28 RX ADMIN — ASPIRIN 81 MG: 81 TABLET, COATED ORAL at 08:57

## 2020-06-28 RX ADMIN — HYDRALAZINE HYDROCHLORIDE 10 MG: 10 TABLET, FILM COATED ORAL at 22:45

## 2020-06-28 RX ADMIN — ISOSORBIDE DINITRATE 10 MG: 10 TABLET ORAL at 08:57

## 2020-06-28 RX ADMIN — ISOSORBIDE DINITRATE 10 MG: 10 TABLET ORAL at 16:54

## 2020-06-28 NOTE — PROGRESS NOTES
Progress Note - Angel Luis Robins 1965, 62 y o  male MRN: [id number]    Unit/Bed#: 419-01 Encounter: [id number]    Primary Care Provider: No primary care provider on file  Date and time admitted to hospital: 6/26/2020  7:48 AM        * Acute combined systolic and diastolic congestive heart failure (HCC)  Assessment & Plan  Wt Readings from Last 3 Encounters:   06/28/20 (!) 145 kg (318 lb 9 oz)   10/28/19 (!) 153 kg (337 lb)   10/18/19 (!) 154 kg (339 lb 9 6 oz)       Patient presenting with orthopnea, pulmonary edema, elevated proBNP, cardiomegaly, long-standing HTN  Echo reveals EF of 20-25%, diffuse hypokinesis, grade 2 diastolic dysfunction  - troponin elevated and flat at 0 09, no chest pain at this time  - continue IV Lasix  - discussed with Cardiology on-call  Cardiology consult if still hospitalized Monday  - isosorbide, hydralazine and beta-blocker added to therapy  No ACE-inhibitor/ARB or spironolactone due to LUCY  - Monitor daily weights/I's/O's  - 2gm Na diet      Essential hypertension  Assessment & Plan  Uncontrolled  Arb/HCTZ held due to elevated Cr at 1 3 with baseline of 1 09  IV Lasix for acute CHF  Added BB, Hydralazine and isosorbide in light of reduced EF - no ACEi/ARB or spironolactone due to LUCY    Morbid obesity ([facility] [address] )  Assessment & Plan  Diet modifications/Nutrition consult     GERD (gastroesophageal reflux disease)  Assessment & Plan  Not on PPI therapy     [facility]  Assessment & Plan  Does not appear in exacerbation  Respiratory protocol       VTE Pharmacologic Prophylaxis:   Pharmacologic: Enoxaparin (Lovenox)  Mechanical VTE Prophylaxis in Place: Yes    Patient Centered Rounds: I have performed bedside rounds with nursing staff today  Discussions with Specialists or Other Care Team Provider: nursing    Education and Discussions with Family / Patient: patient    Time Spent for Care: 30 minutes    More than 50% of total time spent on counseling and coordination of care as described above  Current Length of Stay: 2 day(s)    Current Patient Status: Inpatient   Certification Statement: The patient will continue to require additional inpatient hospital stay due to IV Lasix, close monitriong, Cardiology eval     Discharge Plan: TBD    Code Status: Level 1 - Full Code      Subjective:   Patient seen and examined  He reports feeling improved, denies SOB or chest pain  No o/n events  Objective:     Vitals:   Temp (24hrs), Av 9 °F (36 6 °C), Min:97 6 °F (36 4 °C), Max:98 1 °F (36 7 °C)    Temp:  [97 6 °F (36 4 °C)-98 1 °F (36 7 °C)] 98 1 °F (36 7 °C)  HR:  [] 117  Resp:  [18] 18  BP: (124-156)/() 156/106  SpO2:  [93 %-99 %] 96 %  Body mass index is 47 04 kg/m²  Input and Output Summary (last 24 hours): Intake/Output Summary (Last 24 hours) at 2020 1109  Last data filed at 2020 0849  Gross per 24 hour   Intake 1080 ml   Output 400 ml   Net 680 ml       Physical Exam:     Physical Exam   Constitutional: He is oriented to person, place, and time  No distress  Obese    HENT:   Head: Normocephalic and atraumatic  Eyes: Conjunctivae are normal    Neck: No JVD present  Cardiovascular: Normal rate and regular rhythm  No murmur heard  Pulmonary/Chest: Effort normal  No respiratory distress  He has no wheezes  He has no rales  Abdominal: Soft  He exhibits no distension  There is no tenderness  There is no guarding  Musculoskeletal: He exhibits no edema  Neurological: He is alert and oriented to person, place, and time  Skin: Skin is warm and dry  Psychiatric: He has a normal mood and affect         Additional Data:     Labs:    Results from last 7 days   Lab Units 20  0615 20  0443   WBC Thousand/uL 13 88* 16 18*   HEMOGLOBIN g/dL 14 7 13 5   HEMATOCRIT % 46 6 41 6   PLATELETS Thousands/uL 309 277   NEUTROS PCT %  --  82*   LYMPHS PCT %  --  10*   LYMPHO PCT % 32  --    MONOS PCT %  --  7   MONO PCT % 2*  --    EOS PCT % 0 0     Results from last 7 days   Lab Units 06/28/20  0615   SODIUM mmol/L 139   POTASSIUM mmol/L 3 5   CHLORIDE mmol/L 102   CO2 mmol/L 27   BUN mg/dL 24   CREATININE mg/dL 1 44*   ANION GAP mmol/L 10   CALCIUM mg/dL 8 3   GLUCOSE RANDOM mg/dL 155*             Results from last 7 days   Lab Units 06/26/20  0816   HEMOGLOBIN A1C % 6 2*           * I Have Reviewed All Lab Data Listed Above  * Additional Pertinent [facility] Tests [facility]ed: [facility]    Imaging:    Imaging/Reports Reviewed Today Include: no new       Recent Cultures (last 7 days):           Last 24 Hours Medication List:     Current Facility-Administered Medications:  acetaminophen 650 mg Oral Q6H PRN Kamar Rodriguez MD   albuterol 2 5 mg Nebulization Q6H PRN Rachelle Cohen MD   aspirin 81 mg Oral Daily Kamar Rodriguez MD   atorvastatin 40 mg Oral Daily With Leilani Cassidy MD   cyclobenzaprine 10 mg Oral BID PRN Kamar Rodriguez MD   enoxaparin 40 mg Subcutaneous Daily Kamar Rodriguez MD   furosemide 40 mg Intravenous BID (diuretic) Kamar Rodriguez MD   hydrALAZINE 10 mg Intravenous Q6H PRN Kamar Rodriguez MD   hydrALAZINE 10 mg Oral Q8H [address] Raina Hodge MD   ipratropium 0 5 mg Nebulization TID Rachelle Cohen MD   isosorbide dinitrate 10 mg Oral TID after meals Kamar Rodriguez MD   levalbuterol 1 25 mg Nebulization TID Rachelle Cohen MD   metoprolol tartrate 25 mg Oral Q12H [address] Kamar Rodriguez MD   nicotine 1 patch Transdermal Daily Kamar Rodriguez MD   ondansetron 4 mg Intravenous Q6H PRN Kamar Rodriguez MD   oxyCODONE 5 mg Oral Q4H PRN Kamar Rodriguez MD   pregabalin 200 mg Oral Daily Kamar Rodriguez MD        Today, Patient Was Seen By: Princess Harding MD    ** Please Note: Dictation voice to text software may have been used in the creation of this document   **

## 2020-06-28 NOTE — ASSESSMENT & PLAN NOTE
Wt Readings from Last 3 Encounters:   06/28/20 (!) 145 kg (318 lb 9 oz)   10/28/19 (!) 153 kg (337 lb)   10/18/19 (!) 154 kg (339 lb 9 6 oz)       Patient presenting with orthopnea, pulmonary edema, elevated proBNP, cardiomegaly, long-standing HTN  Echo reveals EF of 20-25%, diffuse hypokinesis, grade 2 diastolic dysfunction  - troponin elevated and flat at 0 09, no chest pain at this time  - continue IV Lasix  - discussed with Cardiology on-call  Cardiology consult if still hospitalized Monday  - isosorbide, hydralazine and beta-blocker added to therapy    No ACE-inhibitor/ARB or spironolactone due to LUCY  - Monitor daily weights/I's/O's  - 2gm Na diet

## 2020-06-29 VITALS
SYSTOLIC BLOOD PRESSURE: 137 MMHG | DIASTOLIC BLOOD PRESSURE: 97 MMHG | HEART RATE: 115 BPM | HEIGHT: 69 IN | OXYGEN SATURATION: 94 % | BODY MASS INDEX: 46.53 KG/M2 | WEIGHT: 314.16 LBS | TEMPERATURE: 97.6 F | RESPIRATION RATE: 16 BRPM

## 2020-06-29 LAB
ANION GAP SERPL CALCULATED.3IONS-SCNC: 11 MMOL/L (ref 4–13)
BASOPHILS # BLD AUTO: 0.06 THOUSANDS/ΜL (ref 0–0.1)
BASOPHILS NFR BLD AUTO: 1 % (ref 0–1)
BUN SERPL-MCNC: 23 MG/DL (ref 5–25)
CALCIUM SERPL-MCNC: 8.3 MG/DL (ref 8.3–10.1)
CHLORIDE SERPL-SCNC: 102 MMOL/L (ref 100–108)
CO2 SERPL-SCNC: 26 MMOL/L (ref 21–32)
CREAT SERPL-MCNC: 1.4 MG/DL (ref 0.6–1.3)
EOSINOPHIL # BLD AUTO: 0.24 THOUSAND/ΜL (ref 0–0.61)
EOSINOPHIL NFR BLD AUTO: 2 % (ref 0–6)
ERYTHROCYTE [DISTWIDTH] IN BLOOD BY AUTOMATED COUNT: 16.7 % (ref 11.6–15.1)
GFR SERPL CREATININE-BSD FRML MDRD: 64 ML/MIN/1.73SQ M
GLUCOSE SERPL-MCNC: 162 MG/DL (ref 65–140)
HCT VFR BLD AUTO: 48.7 % (ref 36.5–49.3)
HGB BLD-MCNC: 15.5 G/DL (ref 12–17)
IMM GRANULOCYTES # BLD AUTO: 0.04 THOUSAND/UL (ref 0–0.2)
IMM GRANULOCYTES NFR BLD AUTO: 0 % (ref 0–2)
LYMPHOCYTES # BLD AUTO: 3.72 THOUSANDS/ΜL (ref 0.6–4.47)
LYMPHOCYTES NFR BLD AUTO: 33 % (ref 14–44)
MAGNESIUM SERPL-MCNC: 1.9 MG/DL (ref 1.6–2.6)
MCH RBC QN AUTO: 29.9 PG (ref 26.8–34.3)
MCHC RBC AUTO-ENTMCNC: 31.8 G/DL (ref 31.4–37.4)
MCV RBC AUTO: 94 FL (ref 82–98)
MONOCYTES # BLD AUTO: 1.04 THOUSAND/ΜL (ref 0.17–1.22)
MONOCYTES NFR BLD AUTO: 9 % (ref 4–12)
NEUTROPHILS # BLD AUTO: 6.22 THOUSANDS/ΜL (ref 1.85–7.62)
NEUTS SEG NFR BLD AUTO: 55 % (ref 43–75)
NRBC BLD AUTO-RTO: 0 /100 WBCS
PLATELET # BLD AUTO: 296 THOUSANDS/UL (ref 149–390)
PMV BLD AUTO: 10.6 FL (ref 8.9–12.7)
POTASSIUM SERPL-SCNC: 3.5 MMOL/L (ref 3.5–5.3)
RBC # BLD AUTO: 5.18 MILLION/UL (ref 3.88–5.62)
SODIUM SERPL-SCNC: 139 MMOL/L (ref 136–145)
WBC # BLD AUTO: 11.32 THOUSAND/UL (ref 4.31–10.16)

## 2020-06-29 PROCEDURE — 94640 AIRWAY INHALATION TREATMENT: CPT

## 2020-06-29 PROCEDURE — 94760 N-INVAS EAR/PLS OXIMETRY 1: CPT

## 2020-06-29 PROCEDURE — 83735 ASSAY OF MAGNESIUM: CPT | Performed by: FAMILY MEDICINE

## 2020-06-29 PROCEDURE — 99239 HOSP IP/OBS DSCHRG MGMT >30: CPT | Performed by: FAMILY MEDICINE

## 2020-06-29 PROCEDURE — 85025 COMPLETE CBC W/AUTO DIFF WBC: CPT | Performed by: FAMILY MEDICINE

## 2020-06-29 PROCEDURE — 80048 BASIC METABOLIC PNL TOTAL CA: CPT | Performed by: FAMILY MEDICINE

## 2020-06-29 PROCEDURE — 99223 1ST HOSP IP/OBS HIGH 75: CPT | Performed by: INTERNAL MEDICINE

## 2020-06-29 RX ADMIN — LEVALBUTEROL 1.25 MG: 1.25 SOLUTION, CONCENTRATE RESPIRATORY (INHALATION) at 07:36

## 2020-06-29 RX ADMIN — IPRATROPIUM BROMIDE 0.5 MG: 0.5 SOLUTION RESPIRATORY (INHALATION) at 07:36

## 2020-06-29 RX ADMIN — FUROSEMIDE 40 MG: 10 INJECTION, SOLUTION INTRAMUSCULAR; INTRAVENOUS at 09:17

## 2020-06-29 RX ADMIN — ISOSORBIDE DINITRATE 10 MG: 10 TABLET ORAL at 09:16

## 2020-06-29 RX ADMIN — ENOXAPARIN SODIUM 40 MG: 40 INJECTION SUBCUTANEOUS at 09:16

## 2020-06-29 RX ADMIN — ASPIRIN 81 MG: 81 TABLET, COATED ORAL at 09:15

## 2020-06-29 RX ADMIN — HYDRALAZINE HYDROCHLORIDE 10 MG: 10 TABLET, FILM COATED ORAL at 06:01

## 2020-06-29 RX ADMIN — METOPROLOL TARTRATE 25 MG: 25 TABLET, FILM COATED ORAL at 09:16

## 2020-06-29 RX ADMIN — PREGABALIN 200 MG: 50 CAPSULE ORAL at 09:15

## 2020-06-29 NOTE — OCCUPATIONAL THERAPY NOTE
Occupational Therapy         Patient Name: Dawson Moore  SMHCU'D Date: 6/29/2020    Order received and chart review performed; pt signed out AMA prior to OT evaluation; will complete orders at this time

## 2020-06-29 NOTE — CONSULTS
Consultation - Cardiology   Tristan Ugarte 62 y o  male MRN: [id number]  Unit/Bed#: 268-52 Encounter: [id number]    Inpatient consult to Cardiology  Consult performed by: Jeananne Mohs, PA-C  Consult ordered by: Bryce Marie MD            Physician Requesting Consult: Bryce Marie MD  Reason for Consult / Principal Problem: acute systolic congestive heart failure    Assessment/Plan:  1  Acute systolic and diastolic congestive heart failure   - new diagnosis, s/p IV diuretics over the weekend   - would recommend transitioning to PO diuretics this afternoon, likely will need lasix 40 mg BID or 80 mg daily for better compliance   - continue low sodium diet, will implement a fluid restriction   - daily weights on a standing scale   - strict I/O   - heart failure education discussed    2  Cardiomyopathy   - EF 25%   - need to rule out ischemic etiology especially given known CAD with prior stenting, ongoing tobacco abuse, uncontrolled hypertension, pre-diabetes   - cardiac cath, risks/benefits discussed with patient, initially he was agreeable to proceed   - patient will need a life vest at discharge    3  Coronary artery disease   - with prior stenting >10 years ago per patient, unable to obtain records at this time   - now on aspirin, statin, beta-blocker therapy   - for repeat cardiac cath as above given low EF    4  Hypertension   - still poorly controlled   - consider switching metoprolol to carvedilol for better blood pressure control  5  Dyslipidemia   - last LDL was 167 in May 2019   - atorvastatin 40 mg initiated   - check AM lipid panel    6  Tobacco abuse   - complete cessation encouraged    During our conversation patient became agitated and was refusing to travel to [facility] via ambulance  When Dr Kanchan Cruz entered room he became completely angry that "nothing was being done here" and desired to sign out AMA      History of Present Illness   HPI: Tristan Ugarte is a 62y o  year old male with coronary artery disease with prior stenting at least 10 years ago - details unknown, hypertension, asthma, and tobacco abuse  The patient presented to the emergency department on Friday, June 26 for the evaluation of shortness of breath x2 weeks  Patient states he has had gradually worsening dyspnea on exertion as well as orthopnea  He has been having some chest tightness when he has been ambulating for the past 2 weeks as well  He had noticed mild lower extremity edema  He does not weigh himself daily at home  Patient continues to smoke 1-2 packs of cigarettes daily  He does not monitor his daily intake of sodium  He denies any lightheadedness, dizziness, palpitations, and syncope  He states he took himself off of blood pressure medication 1 year ago and has lost > 100 lbs over the past year  Since admission he had an echocardiogram showing an ejection fraction of 20-25% with severe diffuse hypokinesis  BNP was noted to be elevated at 4410  He was given Lasix 40 mg IV BID with good urinary output per patient  Cardiology was consulted for further evaluation and management  He feels improvement in his symptoms since receiving IV lasix  Review of Systems   Constitution: Negative for chills and fever  HENT: Negative  Cardiovascular: Negative for chest pain, dyspnea on exertion, leg swelling, orthopnea, palpitations, paroxysmal nocturnal dyspnea and syncope  Respiratory: Negative for cough and shortness of breath  Gastrointestinal: Negative for diarrhea, nausea and vomiting  Genitourinary: Negative  Neurological: Negative for dizziness and light-headedness  All other systems reviewed and are negative      Historical Information   Past Medical History:   Diagnosis Date    Asthma     Back pain     Carpal tunnel syndrome     Hypertension     Knee pain     Neck pain      Past Surgical History:   Procedure Laterality Date    CARPAL TUNNEL RELEASE      HAND SURGERY       Social History Substance and Sexual Activity   Alcohol Use Yes    Comment: occasional     Social History     Substance and Sexual Activity   Drug Use Yes    Types: Marijuana    Comment: periodically/on ocassion     Social History     Tobacco Use   Smoking Status Current Every Day Smoker    Packs/day: 0 50    Years: 36 00    Pack years: 18 00    Types: Cigarettes   Smokeless Tobacco Never Used     Family History: non-contributory    Meds/Allergies   all current active meds have been reviewed       No Known Allergies    Objective   Vitals: Blood pressure 137/97, pulse (!) 115, temperature 97 6 °F (36 4 °C), temperature source Oral, resp  rate 16, height 5' 9" (1 753 m), weight (!) 143 kg (314 lb 2 5 oz), SpO2 94 %  , Body mass index is 46 39 kg/m² ,   Orthostatic Blood Pressures      Most Recent Value   Blood Pressure  137/97 filed at 06/29/2020 0720   Patient Position - Orthostatic VS  Sitting filed at 06/29/2020 8129        Systolic (18QTJ), PHR:750 , Min:126 , NVZ:349     Diastolic (32ZKU), WRQ:63, Min:68, Max:105      Intake/Output Summary (Last 24 hours) at 6/29/2020 0932  Last data filed at 6/29/2020 0849  Gross per 24 hour   Intake 660 ml   Output    Net 660 ml       Weight (last 2 days)     Date/Time   Weight    06/29/20 0600   (!) 143 (314 16)    06/28/20 0600   (!) 145 (318 56)    06/27/20 0600   (!) 147 (324 74)              Invasive Devices     Peripheral Intravenous Line            Peripheral IV 06/28/20 Dorsal (posterior); Right Hand less than 1 day                  Physical Exam   Constitutional: He is oriented to person, place, and time  No distress  Obese male in no acute distress   HENT:   Head: Normocephalic and atraumatic  Eyes: Pupils are equal, round, and reactive to light  Neck: Normal range of motion  No JVD present  Carotid bruit is not present  Cardiovascular: Regular rhythm, S1 normal and S2 normal  Tachycardia present  No murmur heard    Pulses:       Radial pulses are 2+ on the right side, and 2+ on the left side  Posterior tibial pulses are 1+ on the right side, and 1+ on the left side  Pulmonary/Chest: Effort normal and breath sounds normal  No respiratory distress  He has no wheezes  He has no rales  Abdominal: Soft  There is no tenderness  Musculoskeletal: Normal range of motion  He exhibits no edema or deformity  Neurological: He is alert and oriented to person, place, and time  Skin: Skin is warm and dry  He is not diaphoretic  No erythema  Psychiatric: His behavior is normal  His affect is angry  Vitals reviewed  Laboratory Results:  Results from last 7 days   Lab Units 06/26/20  1434 06/26/20  0816   TROPONIN I ng/mL 0 09* 0 09*       CBC with diff:   Results from last 7 days   Lab Units 06/29/20  0456 06/28/20  0615 06/27/20  0443 06/26/20  0816   WBC Thousand/uL 11 32* 13 88* 16 18* 11 41*   HEMOGLOBIN g/dL 15 5 14 7 13 5 14 1   HEMATOCRIT % 48 7 46 6 41 6 44 0   MCV fL 94 92 91 92   PLATELETS Thousands/uL 296 309 277 252   MCH pg 29 9 29 1 29 5 29 3   MCHC g/dL 31 8 31 5 32 5 32 0   RDW % 16 7* 16 7* 15 9* 16 2*   MPV fL 10 6 9 8 10 6 10 0   NRBC AUTO /100 WBCs 0 0 0 0         CMP:  Results from last 7 days   Lab Units 06/29/20  0456 06/28/20  0615 06/27/20  0443 06/26/20  0816   POTASSIUM mmol/L 3 5 3 5 4 0 4 1   CHLORIDE mmol/L 102 102 102 102   CO2 mmol/L 26 27 24 23   BUN mg/dL 23 24 23 21   CREATININE mg/dL 1 40* 1 44* 1 44* 1 34*   CALCIUM mg/dL 8 3 8 3 8 1* 8 1*   EGFR ml/min/1 73sq m 64 62 62 68         BMP:  Results from last 7 days   Lab Units 06/29/20  0456 06/28/20  0615 06/27/20  0443 06/26/20  0816   POTASSIUM mmol/L 3 5 3 5 4 0 4 1   CHLORIDE mmol/L 102 102 102 102   CO2 mmol/L 26 27 24 23   BUN mg/dL 23 24 23 21   CREATININE mg/dL 1 40* 1 44* 1 44* 1 34*   CALCIUM mg/dL 8 3 8 3 8 1* 8 1*       BNP:  No results for input(s): BNP in the last 72 hours      Magnesium:   Results from last 7 days   Lab Units 06/29/20  0456   MAGNESIUM mg/dL 1 9 Coags:       TSH:       Hemoglobin A1C   Results from last 7 days   Lab Units 20  0816   HEMOGLOBIN A1C % 6 2*       Lipid Profile:         Cardiac testing:   Results for orders placed during the hospital encounter of 20   Echo complete with contrast if indicated    Narrative [address]  [place] [address]  [phone]    Transthoracic Echocardiogram  2D, M-mode, Doppler, and Color Doppler    Study date:  2020    Patient: Esvin Bhatia  MR number: [id number]  Account number: [de-identified]  : 1965  Age: 62 years  Gender: Male  Status: Inpatient  Location: Emergency room  Height: 69 in  Weight: 334 4 lb  BP: 130/ 96 mmHg    Indications: congestive heart failure, increased shortness of breath    Diagnoses: 428 0 - CONGESTIVE HEART FAILURE, R06 02 - Shortness of breath    Sonographer:  Meredith Haro RDCS  Primary Physician:  Dawson Hernandez MD  Referring Physician:  Sage Valero MD  Group:  [facility]  Interpreting Physician:  Michelle Brandt MD    SUMMARY    PROCEDURE INFORMATION:  This was a technically difficult study  LEFT VENTRICLE:  Systolic function was severely reduced by visual assessment  Ejection fraction was estimated in the range of 20 % to 25 %  There appeared to be severe diffuse hypokinesis  This study was inadequate for a more detailed evaluation of regional wall motion  Wall thickness was mildly increased  There was mild concentric hypertrophy  Features were consistent with a pseudonormal left ventricular filling pattern, with concomitant abnormal relaxation and increased filling pressure (grade 2 diastolic dysfunction)  RIGHT VENTRICLE:  The size was normal   Systolic function was normal     LEFT ATRIUM:  The atrium was mildly dilated  RIGHT ATRIUM:  The atrium was moderately dilated  MITRAL VALVE:  There was mild regurgitation      TRICUSPID VALVE:  There was mild to moderate regurgitation  PERICARDIUM:  A trace pericardial effusion was identified  HISTORY: PRIOR HISTORY: morbid obesity, obstructive lung disease, hypertension, diabetes mellitus, current  smoker    PROCEDURE: The procedure was performed in the emergency room  This was a stat study  The transthoracic approach was used  The study included complete 2D imaging, M-mode, complete spectral Doppler, and color Doppler  Images were obtained  from the parasternal, apical, subcostal, and suprasternal notch acoustic windows  Echocardiographic views were limited due to restricted patient mobility, poor acoustic window availability, decreased penetration, and lung interference  This was a technically difficult study  LEFT VENTRICLE: Size was normal  Systolic function was severely reduced by visual assessment  Ejection fraction was estimated in the range of 20 % to 25 %  There appeared to be severe diffuse hypokinesis  This study was inadequate for a  more detailed evaluation of regional wall motion  Wall thickness was mildly increased  There was mild concentric hypertrophy  DOPPLER: Features were consistent with a pseudonormal left ventricular filling pattern, with concomitant abnormal  relaxation and increased filling pressure (grade 2 diastolic dysfunction)  RIGHT VENTRICLE: The size was normal  Systolic function was normal  Wall thickness was normal     LEFT ATRIUM: The atrium was mildly dilated  RIGHT ATRIUM: The atrium was moderately dilated  MITRAL VALVE: Valve structure was normal  There was normal leaflet separation  DOPPLER: The transmitral velocity was within the normal range  There was no evidence for stenosis  There was mild regurgitation  AORTIC VALVE: The valve was trileaflet  Leaflets exhibited mildly increased thickness, mild calcification, and normal cuspal separation  DOPPLER: Transaortic velocity was within the normal range  There was no evidence for stenosis   There  was no significant regurgitation  TRICUSPID VALVE: The valve structure was normal  There was normal leaflet separation  DOPPLER: The transtricuspid velocity was within the normal range  There was no evidence for stenosis  There was mild to moderate regurgitation  Estimated  peak PA pressure was 50 mmHg  PULMONIC VALVE: Leaflets exhibited normal thickness, no calcification, and normal cuspal separation  DOPPLER: The transpulmonic velocity was within the normal range  There was no significant regurgitation  PERICARDIUM: A trace pericardial effusion was identified  The pericardium was normal in appearance  AORTA: The root exhibited normal size  SYSTEMIC VEINS: IVC: The inferior vena cava was normal in size  Respirophasic changes were normal     MEASUREMENT TABLES    OTHER ECHO MEASUREMENTS  (Reference normals)  Estimated CVP   10 mmHg   (--)    SYSTEM MEASUREMENT TABLES    2D  %FS: 6 96 %  Ao Diam: 2 95 cm  EDV(Teich): 166 92 ml  EF(Teich): 15 3 %  ESV(Teich): 141 38 ml  IVSd: 1 16 cm  LA Area: 16 33 cm2  LA Diam: 4 74 cm  LVEDV MOD A4C: 196 82 ml  LVEF MOD A4C: 25 23 %  LVESV MOD A4C: 147 17 ml  LVIDd: 5 81 cm  LVIDs: 5 4 cm  LVLd A4C: 9 12 cm  LVLs A4C: 8 77 cm  LVPWd: 1 28 cm  RA Area: 35 13 cm2  RVIDd: 4 32 cm  RWT: 0 44  SV MOD A4C: 49 65 ml  SV(Teich): 25 54 ml    CW  AV Vmax: 1 3 m/s  AV maxP 8 mmHg  PV Vmax: 1 1 m/s  PV maxP 84 mmHg  TR Vmax: 3 17 m/s  TR maxP 43 mmHg    MM  EPSS: 2 25 cm  TAPSE: 1 86 cm    PW  E' Lat: 0 08 m/s  E' Sept: 0 08 m/s  LVOT Vmax: 0 52 m/s  LVOT maxP 1 mmHg  RVOT Vmax: 0 67 m/s  RVOT maxP 79 mmHg    [employer] Accredited Echocardiography Laboratory    Prepared and electronically signed by    Neo Barber MD  Signed 2020 18:38:15       No results found for this or any previous visit  No results found for this or any previous visit  No results found for this or any previous visit  Imaging: I have personally reviewed pertinent reports  Xr Chest 1 View Portable    Result Date: 6/26/2020  Narrative: CHEST INDICATION:   r/o PNA  COMPARISON:  Chest radiograph from 2/26/2017  EXAM PERFORMED/VIEWS:  XR CHEST PORTABLE FINDINGS: Cardiomediastinal silhouette appears unremarkable  The lungs are clear  No pneumothorax or pleural effusion  Osseous structures appear within normal limits for patient age  Impression: No acute cardiopulmonary disease  Workstation performed: [id number]     Cta Ed Chest Pe Study    Result Date: 6/26/2020  Narrative: CTA - CHEST WITH IV CONTRAST - PULMONARY ANGIOGRAM INDICATION:   PE suspected, intermediate prob, positive D-dimer Dyspnea, elevated troponin, elevated dimer, elevated BNP, r/o PE  COMPARISON: Chest 6/26/2020 TECHNIQUE: CTA examination of the chest was performed using angiographic technique according to a protocol specifically tailored to evaluate for pulmonary embolism  Axial, sagittal, and coronal 2D reformatted images were created from the source data and  submitted for interpretation  In addition, coronal 3D MIP postprocessing was performed on the acquisition scanner  Radiation dose length product (DLP) for this visit:  558 72 mGy-cm   This examination, like all CT scans performed in the [facility], was performed utilizing techniques to minimize radiation dose exposure, including the use of iterative  reconstruction and automated exposure control  IV Contrast:  85 mL of iohexol (OMNIPAQUE)  FINDINGS: PULMONARY ARTERIAL TREE:  There is adequate opacification of the pulmonary arterial circulation with no filling defects identified to suggest acute pulmonary emboli  Moderate enlargement of the central pulmonary arteries raisesp possibility of underlying pulmonary arterial hypertension  Contrast reflux from the right atrium into the inferior vena cava and hepatic veins is consistent with elevated right heart pressure  Normal RV/LV ratio  LUNGS:  Lungs are clear    There is no tracheal or endobronchial lesion  PLEURA:  Unremarkable  HEART/GREAT VESSELS:  The heart is moderately enlarged  Pulmonary arterial enlargement is consistent with pulmonary arterial hypertension  Thoracic aorta and great venous structures are normal in course and caliber  MEDIASTINUM AND LEIDA:  Mildly prominent mediastinal lymph nodes are present with small right paratracheal lymph nodes measuring up to 12 mm in greatest dimension and subcarinal lymph node measuring up to 12 mm in greatest dimension  CHEST WALL AND LOWER NECK:   Unremarkable  VISUALIZED STRUCTURES IN THE UPPER ABDOMEN:  Unremarkable  OSSEOUS STRUCTURES:  No acute fracture or destructive osseous lesion  Impression: 1  No evidence for acute pulmonary embolism or other acute intrathoracic abnormality  2   Dilated central pulmonary arterial vasculature suggesting pulmonary arterial hypertension  Reflux of contrast into the inferior vena cava and hepatic veins is consistent with elevated right heart pressures  3   Cardiomegaly   Workstation performed: [id number]       EKG reviewed personally: sinus tachycardia, nonspecific T wave abnormality  Telemetry reviewed personally: patient not on telemetry    Assessment:  Principal Problem:    Acute combined systolic and diastolic congestive heart failure (HCC)  Active Problems:    Chronic obstructive lung disease (HCC)    GERD (gastroesophageal reflux disease)    Morbid obesity ([address] )    Essential hypertension        Code Status: Level 1 - Full Code

## 2020-06-29 NOTE — DISCHARGE SUMMARY
Discharge Summary - Tristan Ugarte 62 y o  male MRN: [id number]    Unit/Bed#: 812-86 Encounter: [id number]    Admission Date:   Admission Orders (From admission, onward)     Ordered        06/26/20 1036  Inpatient Admission  Once                     Admitting Diagnosis: Shortness of breath [R06 02]  Elevated troponin [R79 89]  Dyspnea, unspecified type [R06 00]    HPI:  Mr Maurice Hernandez is a 68-year-old male  With history of coronary artery disease status post PCI 2008, diabetes, morbid obesity and hypertension, who admits to noncompliance with medical regimen  He presents with acute CHF and noted for a markedly reduced  Left ventricular ejection fraction estimated at 20-25%  The patient was admitted for diuresis  And medical management  He was evaluated by Cardiology today, 06/29/2020 and recommended a transfer to [facility] for cardiac catheterization  The patient refused further treatment or transfer and wanted to leave against medical advice  The patient was urged to stay for further management  The risk of sudden death due to a reduced ejection fraction and risk this lady cardiac arrhythmias with discussed with the patient in detail and he verbalized understanding  Despite the warnings, the patient still chose to leave against medical advice  He was advised to seek medical treatment as soon as possible  Procedures Performed:   Orders Placed This Encounter   Procedures    ED ECG Documentation Only       Summary of Hospital Course: as above    Significant Findings, Care, Treatment and Services Provided: please refer to chart    Complications: None    Discharge Diagnosis: acute systolic CHF, CAD    Resolved Problems  Date Reviewed: 6/28/2020    None          Condition at Discharge: stable         Discharge instructions/Information to patient and family:   See after visit summary for information provided to patient and family        Provisions for Follow-Up Care:  See after visit summary for information related to follow-up care and any pertinent home health orders  PCP: No primary care provider on file  Disposition: Left against medical advice    Planned Readmission: No      Discharge Statement   I spent 35 minutes discharging the patient  This time was spent on the day of discharge  I had direct contact with the patient on the day of discharge  Additional documentation is required if more than 30 minutes were spent on discharge  Discharge Medications:  See after visit summary for reconciled discharge medications provided to patient and family

## 2020-07-03 DIAGNOSIS — M47.816 FACET ARTHROPATHY, LUMBAR: ICD-10-CM

## 2020-08-12 ENCOUNTER — HOSPITAL ENCOUNTER (EMERGENCY)
Facility: HOSPITAL | Age: 55
End: 2020-08-13
Attending: EMERGENCY MEDICINE | Admitting: EMERGENCY MEDICINE
Payer: MEDICARE

## 2020-08-12 ENCOUNTER — APPOINTMENT (EMERGENCY)
Dept: CT IMAGING | Facility: HOSPITAL | Age: 55
End: 2020-08-12
Payer: MEDICARE

## 2020-08-12 DIAGNOSIS — R17 ELEVATED BILIRUBIN: ICD-10-CM

## 2020-08-12 DIAGNOSIS — R79.89 ELEVATED TSH: ICD-10-CM

## 2020-08-12 DIAGNOSIS — N12 PYELONEPHRITIS: ICD-10-CM

## 2020-08-12 DIAGNOSIS — I31.3 PERICARDIAL EFFUSION: ICD-10-CM

## 2020-08-12 DIAGNOSIS — N28.9 ACUTE ON CHRONIC RENAL INSUFFICIENCY: ICD-10-CM

## 2020-08-12 DIAGNOSIS — R77.8 ELEVATED TROPONIN I LEVEL: ICD-10-CM

## 2020-08-12 DIAGNOSIS — N18.9 ACUTE ON CHRONIC RENAL INSUFFICIENCY: ICD-10-CM

## 2020-08-12 DIAGNOSIS — R07.9 CHEST PAIN: ICD-10-CM

## 2020-08-12 DIAGNOSIS — I47.2 VENTRICULAR TACHYCARDIA (HCC): Primary | ICD-10-CM

## 2020-08-12 DIAGNOSIS — K81.9 CHOLECYSTITIS: ICD-10-CM

## 2020-08-12 DIAGNOSIS — R06.00 DYSPNEA: ICD-10-CM

## 2020-08-12 LAB
ALBUMIN SERPL BCP-MCNC: 3.1 G/DL (ref 3.5–5)
ALP SERPL-CCNC: 72 U/L (ref 46–116)
ALT SERPL W P-5'-P-CCNC: 46 U/L (ref 12–78)
ANION GAP SERPL CALCULATED.3IONS-SCNC: 10 MMOL/L (ref 4–13)
ANISOCYTOSIS BLD QL SMEAR: PRESENT
AST SERPL W P-5'-P-CCNC: 40 U/L (ref 5–45)
BASOPHILS # BLD MANUAL: 0 THOUSAND/UL (ref 0–0.1)
BASOPHILS NFR MAR MANUAL: 0 % (ref 0–1)
BILIRUB SERPL-MCNC: 2.3 MG/DL (ref 0.2–1)
BUN SERPL-MCNC: 21 MG/DL (ref 5–25)
CALCIUM SERPL-MCNC: 8.3 MG/DL (ref 8.3–10.1)
CHLORIDE SERPL-SCNC: 104 MMOL/L (ref 100–108)
CO2 SERPL-SCNC: 22 MMOL/L (ref 21–32)
CREAT SERPL-MCNC: 1.55 MG/DL (ref 0.6–1.3)
D DIMER PPP FEU-MCNC: 1.53 UG/ML FEU
EOSINOPHIL # BLD MANUAL: 0.12 THOUSAND/UL (ref 0–0.4)
EOSINOPHIL NFR BLD MANUAL: 1 % (ref 0–6)
ERYTHROCYTE [DISTWIDTH] IN BLOOD BY AUTOMATED COUNT: 15 % (ref 11.6–15.1)
GFR SERPL CREATININE-BSD FRML MDRD: 57 ML/MIN/1.73SQ M
GLUCOSE SERPL-MCNC: 101 MG/DL (ref 65–140)
HCT VFR BLD AUTO: 46.9 % (ref 36.5–49.3)
HGB BLD-MCNC: 14.9 G/DL (ref 12–17)
LG PLATELETS BLD QL SMEAR: PRESENT
LYMPHOCYTES # BLD AUTO: 3.89 THOUSAND/UL (ref 0.6–4.47)
LYMPHOCYTES # BLD AUTO: 32 % (ref 14–44)
MACROCYTES BLD QL AUTO: PRESENT
MAGNESIUM SERPL-MCNC: 1.7 MG/DL (ref 1.6–2.6)
MCH RBC QN AUTO: 28.8 PG (ref 26.8–34.3)
MCHC RBC AUTO-ENTMCNC: 31.8 G/DL (ref 31.4–37.4)
MCV RBC AUTO: 91 FL (ref 82–98)
MONOCYTES # BLD AUTO: 1.22 THOUSAND/UL (ref 0–1.22)
MONOCYTES NFR BLD: 10 % (ref 4–12)
NEUTROPHILS # BLD MANUAL: 6.2 THOUSAND/UL (ref 1.85–7.62)
NEUTS SEG NFR BLD AUTO: 51 % (ref 43–75)
NRBC BLD AUTO-RTO: 0 /100 WBCS
PLATELET # BLD AUTO: 287 THOUSANDS/UL (ref 149–390)
PLATELET BLD QL SMEAR: ADEQUATE
PMV BLD AUTO: 9.6 FL (ref 8.9–12.7)
POTASSIUM SERPL-SCNC: 3.9 MMOL/L (ref 3.5–5.3)
PROT SERPL-MCNC: 7.4 G/DL (ref 6.4–8.2)
RBC # BLD AUTO: 5.17 MILLION/UL (ref 3.88–5.62)
SODIUM SERPL-SCNC: 136 MMOL/L (ref 136–145)
TOTAL CELLS COUNTED SPEC: 100
TROPONIN I SERPL-MCNC: 0.07 NG/ML
TSH SERPL DL<=0.05 MIU/L-ACNC: 7.88 UIU/ML (ref 0.36–3.74)
VARIANT LYMPHS # BLD AUTO: 6 %
WBC # BLD AUTO: 12.16 THOUSAND/UL (ref 4.31–10.16)

## 2020-08-12 PROCEDURE — 93005 ELECTROCARDIOGRAM TRACING: CPT

## 2020-08-12 PROCEDURE — 80053 COMPREHEN METABOLIC PANEL: CPT | Performed by: EMERGENCY MEDICINE

## 2020-08-12 PROCEDURE — 99285 EMERGENCY DEPT VISIT HI MDM: CPT

## 2020-08-12 PROCEDURE — 84443 ASSAY THYROID STIM HORMONE: CPT | Performed by: EMERGENCY MEDICINE

## 2020-08-12 PROCEDURE — 74177 CT ABD & PELVIS W/CONTRAST: CPT

## 2020-08-12 PROCEDURE — 85027 COMPLETE CBC AUTOMATED: CPT | Performed by: EMERGENCY MEDICINE

## 2020-08-12 PROCEDURE — 71275 CT ANGIOGRAPHY CHEST: CPT

## 2020-08-12 PROCEDURE — 83735 ASSAY OF MAGNESIUM: CPT | Performed by: EMERGENCY MEDICINE

## 2020-08-12 PROCEDURE — 85007 BL SMEAR W/DIFF WBC COUNT: CPT | Performed by: EMERGENCY MEDICINE

## 2020-08-12 PROCEDURE — G1004 CDSM NDSC: HCPCS

## 2020-08-12 PROCEDURE — 85379 FIBRIN DEGRADATION QUANT: CPT | Performed by: EMERGENCY MEDICINE

## 2020-08-12 PROCEDURE — 99285 EMERGENCY DEPT VISIT HI MDM: CPT | Performed by: EMERGENCY MEDICINE

## 2020-08-12 PROCEDURE — 36415 COLL VENOUS BLD VENIPUNCTURE: CPT | Performed by: EMERGENCY MEDICINE

## 2020-08-12 PROCEDURE — 84484 ASSAY OF TROPONIN QUANT: CPT | Performed by: EMERGENCY MEDICINE

## 2020-08-12 RX ORDER — MAGNESIUM SULFATE 1 G/100ML
1 INJECTION INTRAVENOUS ONCE
Status: COMPLETED | OUTPATIENT
Start: 2020-08-13 | End: 2020-08-13

## 2020-08-12 RX ORDER — AMIODARONE HYDROCHLORIDE 50 MG/ML
1 INJECTION, SOLUTION INTRAVENOUS ONCE
Status: DISCONTINUED | OUTPATIENT
Start: 2020-08-12 | End: 2020-08-13 | Stop reason: HOSPADM

## 2020-08-12 RX ADMIN — IOHEXOL 100 ML: 350 INJECTION, SOLUTION INTRAVENOUS at 23:50

## 2020-08-13 ENCOUNTER — HOSPITAL ENCOUNTER (INPATIENT)
Facility: HOSPITAL | Age: 55
LOS: 8 days | Discharge: HOME | DRG: 291 | End: 2020-08-21
Attending: INTERNAL MEDICINE | Admitting: INTERNAL MEDICINE
Payer: MEDICARE

## 2020-08-13 ENCOUNTER — APPOINTMENT (INPATIENT)
Dept: RADIOLOGY | Facility: HOSPITAL | Age: 55
DRG: 291 | End: 2020-08-13
Payer: MEDICARE

## 2020-08-13 ENCOUNTER — APPOINTMENT (INPATIENT)
Dept: NON INVASIVE DIAGNOSTICS | Facility: HOSPITAL | Age: 55
DRG: 291 | End: 2020-08-13
Payer: MEDICARE

## 2020-08-13 VITALS
DIASTOLIC BLOOD PRESSURE: 84 MMHG | RESPIRATION RATE: 20 BRPM | BODY MASS INDEX: 46.65 KG/M2 | TEMPERATURE: 98.4 F | SYSTOLIC BLOOD PRESSURE: 122 MMHG | HEIGHT: 69 IN | HEART RATE: 90 BPM | WEIGHT: 315 LBS | OXYGEN SATURATION: 95 %

## 2020-08-13 DIAGNOSIS — I31.3 PERICARDIAL EFFUSION: ICD-10-CM

## 2020-08-13 DIAGNOSIS — M47.816 FACET ARTHROPATHY, LUMBAR: ICD-10-CM

## 2020-08-13 DIAGNOSIS — K21.9 GASTROESOPHAGEAL REFLUX DISEASE WITHOUT ESOPHAGITIS: ICD-10-CM

## 2020-08-13 DIAGNOSIS — I50.41 ACUTE COMBINED SYSTOLIC AND DIASTOLIC CONGESTIVE HEART FAILURE (HCC): Primary | ICD-10-CM

## 2020-08-13 DIAGNOSIS — I21.4 NSTEMI (NON-ST ELEVATED MYOCARDIAL INFARCTION) (HCC): ICD-10-CM

## 2020-08-13 DIAGNOSIS — M48.00 CENTRAL STENOSIS OF SPINAL CANAL: ICD-10-CM

## 2020-08-13 DIAGNOSIS — M43.16 SPONDYLOLISTHESIS AT L4-L5 LEVEL: ICD-10-CM

## 2020-08-13 DIAGNOSIS — J43.8 OTHER EMPHYSEMA (HCC): ICD-10-CM

## 2020-08-13 DIAGNOSIS — I42.0 DILATED CARDIOMYOPATHY (HCC): ICD-10-CM

## 2020-08-13 DIAGNOSIS — I47.2 SUSTAINED VT (VENTRICULAR TACHYCARDIA) (HCC): ICD-10-CM

## 2020-08-13 DIAGNOSIS — K81.0 ACUTE CHOLECYSTITIS: ICD-10-CM

## 2020-08-13 DIAGNOSIS — M65.30 ACQUIRED TRIGGER FINGER: ICD-10-CM

## 2020-08-13 DIAGNOSIS — E66.01 MORBID OBESITY (HCC): ICD-10-CM

## 2020-08-13 PROBLEM — R73.03 PRE-DIABETES: Status: ACTIVE | Noted: 2020-08-13

## 2020-08-13 PROBLEM — J96.01 ACUTE RESPIRATORY FAILURE WITH HYPOXIA (HCC): Status: ACTIVE | Noted: 2020-08-13

## 2020-08-13 PROBLEM — E03.9 HYPOTHYROIDISM (ACQUIRED): Status: ACTIVE | Noted: 2020-08-13

## 2020-08-13 PROBLEM — E78.5 HYPERLIPIDEMIA: Status: ACTIVE | Noted: 2020-08-13

## 2020-08-13 PROBLEM — N18.2 CKD (CHRONIC KIDNEY DISEASE) STAGE 2, GFR 60-89 ML/MIN: Status: ACTIVE | Noted: 2020-08-13

## 2020-08-13 PROBLEM — E55.9 VITAMIN D DEFICIENCY: Status: ACTIVE | Noted: 2020-08-13

## 2020-08-13 LAB
AMPHETAMINES SERPL QL SCN: NEGATIVE
ANION GAP SERPL CALCULATED.3IONS-SCNC: 12 MMOL/L (ref 4–13)
ANION GAP SERPL CALCULATED.3IONS-SCNC: 12 MMOL/L (ref 4–13)
ATRIAL RATE: 100 BPM
ATRIAL RATE: 109 BPM
ATRIAL RATE: 97 BPM
BARBITURATES UR QL: NEGATIVE
BASE EXCESS BLDA CALC-SCNC: -7 MMOL/L (ref -2–3)
BENZODIAZ UR QL: NEGATIVE
BUN SERPL-MCNC: 23 MG/DL (ref 5–25)
BUN SERPL-MCNC: 25 MG/DL (ref 5–25)
CA-I BLD-SCNC: 1.09 MMOL/L (ref 1.12–1.32)
CALCIUM SERPL-MCNC: 8.4 MG/DL (ref 8.3–10.1)
CALCIUM SERPL-MCNC: 8.7 MG/DL (ref 8.3–10.1)
CHLORIDE SERPL-SCNC: 103 MMOL/L (ref 100–108)
CHLORIDE SERPL-SCNC: 106 MMOL/L (ref 100–108)
CO2 SERPL-SCNC: 16 MMOL/L (ref 21–32)
CO2 SERPL-SCNC: 22 MMOL/L (ref 21–32)
COCAINE UR QL: NEGATIVE
CREAT SERPL-MCNC: 1.64 MG/DL (ref 0.6–1.3)
CREAT SERPL-MCNC: 1.73 MG/DL (ref 0.6–1.3)
DS:DELIVERY SYSTEM: ABNORMAL
ERYTHROCYTE [DISTWIDTH] IN BLOOD BY AUTOMATED COUNT: 16.6 % (ref 11.6–15.1)
FIO2 GAS DIL.REBREATH: 6 L
GFR SERPL CREATININE-BSD FRML MDRD: 50 ML/MIN/1.73SQ M
GFR SERPL CREATININE-BSD FRML MDRD: 53 ML/MIN/1.73SQ M
GLUCOSE SERPL-MCNC: 116 MG/DL (ref 65–140)
GLUCOSE SERPL-MCNC: 71 MG/DL (ref 65–140)
GLUCOSE SERPL-MCNC: 73 MG/DL (ref 65–140)
GLUCOSE SERPL-MCNC: 79 MG/DL (ref 65–140)
GLUCOSE SERPL-MCNC: 80 MG/DL (ref 65–140)
GLUCOSE SERPL-MCNC: 87 MG/DL (ref 65–140)
HCO3 BLDA-SCNC: 15.1 MMOL/L (ref 22–28)
HCT VFR BLD AUTO: 52.7 % (ref 36.5–49.3)
HCT VFR BLD CALC: 51 % (ref 36.5–49.3)
HGB BLD-MCNC: 16.5 G/DL (ref 12–17)
HGB BLDA-MCNC: 17.3 G/DL (ref 12–17)
LACTATE SERPL-SCNC: 2.6 MMOL/L (ref 0.5–2)
LACTATE SERPL-SCNC: 3.2 MMOL/L (ref 0.5–2)
MAGNESIUM SERPL-MCNC: 2.2 MG/DL (ref 1.6–2.6)
MCH RBC QN AUTO: 29.2 PG (ref 26.8–34.3)
MCHC RBC AUTO-ENTMCNC: 31.3 G/DL (ref 31.4–37.4)
MCV RBC AUTO: 93 FL (ref 82–98)
METHADONE UR QL: NEGATIVE
OPIATES UR QL SCN: NEGATIVE
OXYCODONE+OXYMORPHONE UR QL SCN: NEGATIVE
P AXIS: 62 DEGREES
P AXIS: 62 DEGREES
P AXIS: 67 DEGREES
PCO2 BLD: 16 MMOL/L (ref 21–32)
PCO2 BLD: 24.5 MM HG (ref 36–44)
PCP UR QL: NEGATIVE
PH BLD: 7.4 [PH] (ref 7.35–7.45)
PHOSPHATE SERPL-MCNC: 4.9 MG/DL (ref 2.7–4.5)
PLATELET # BLD AUTO: 310 THOUSANDS/UL (ref 149–390)
PMV BLD AUTO: 10 FL (ref 8.9–12.7)
PO2 BLD: 153 MM HG (ref 75–129)
POTASSIUM BLD-SCNC: 5.2 MMOL/L (ref 3.5–5.3)
POTASSIUM SERPL-SCNC: 4 MMOL/L (ref 3.5–5.3)
POTASSIUM SERPL-SCNC: 5.9 MMOL/L (ref 3.5–5.3)
PR INTERVAL: 146 MS
PR INTERVAL: 148 MS
PR INTERVAL: 150 MS
QRS AXIS: -19 DEGREES
QRS AXIS: -22 DEGREES
QRS AXIS: -34 DEGREES
QRSD INTERVAL: 74 MS
QRSD INTERVAL: 76 MS
QRSD INTERVAL: 76 MS
QT INTERVAL: 350 MS
QT INTERVAL: 380 MS
QT INTERVAL: 394 MS
QTC INTERVAL: 471 MS
QTC INTERVAL: 490 MS
QTC INTERVAL: 500 MS
RBC # BLD AUTO: 5.65 MILLION/UL (ref 3.88–5.62)
SAO2 % BLD FROM PO2: 99 % (ref 60–85)
SARS-COV-2 RNA RESP QL NAA+PROBE: NEGATIVE
SODIUM BLD-SCNC: 133 MMOL/L (ref 136–145)
SODIUM SERPL-SCNC: 134 MMOL/L (ref 136–145)
SODIUM SERPL-SCNC: 137 MMOL/L (ref 136–145)
SPECIMEN SOURCE: ABNORMAL
T WAVE AXIS: -20 DEGREES
T WAVE AXIS: 12 DEGREES
T WAVE AXIS: 28 DEGREES
THC UR QL: NEGATIVE
TROPONIN I SERPL-MCNC: 0.04 NG/ML
TROPONIN I SERPL-MCNC: 0.1 NG/ML
VENTRICULAR RATE: 100 BPM
VENTRICULAR RATE: 109 BPM
VENTRICULAR RATE: 97 BPM
WBC # BLD AUTO: 21.3 THOUSAND/UL (ref 4.31–10.16)

## 2020-08-13 PROCEDURE — 83605 ASSAY OF LACTIC ACID: CPT | Performed by: STUDENT IN AN ORGANIZED HEALTH CARE EDUCATION/TRAINING PROGRAM

## 2020-08-13 PROCEDURE — 80048 BASIC METABOLIC PNL TOTAL CA: CPT | Performed by: INTERNAL MEDICINE

## 2020-08-13 PROCEDURE — 96367 TX/PROPH/DG ADDL SEQ IV INF: CPT

## 2020-08-13 PROCEDURE — 96365 THER/PROPH/DIAG IV INF INIT: CPT

## 2020-08-13 PROCEDURE — 84132 ASSAY OF SERUM POTASSIUM: CPT

## 2020-08-13 PROCEDURE — 99233 SBSQ HOSP IP/OBS HIGH 50: CPT | Performed by: NURSE PRACTITIONER

## 2020-08-13 PROCEDURE — 93005 ELECTROCARDIOGRAM TRACING: CPT

## 2020-08-13 PROCEDURE — 84484 ASSAY OF TROPONIN QUANT: CPT | Performed by: ANESTHESIOLOGY

## 2020-08-13 PROCEDURE — 83735 ASSAY OF MAGNESIUM: CPT | Performed by: INTERNAL MEDICINE

## 2020-08-13 PROCEDURE — 93308 TTE F-UP OR LMTD: CPT | Performed by: INTERNAL MEDICINE

## 2020-08-13 PROCEDURE — 82330 ASSAY OF CALCIUM: CPT

## 2020-08-13 PROCEDURE — 93010 ELECTROCARDIOGRAM REPORT: CPT | Performed by: INTERNAL MEDICINE

## 2020-08-13 PROCEDURE — 84100 ASSAY OF PHOSPHORUS: CPT | Performed by: ANESTHESIOLOGY

## 2020-08-13 PROCEDURE — 84484 ASSAY OF TROPONIN QUANT: CPT | Performed by: EMERGENCY MEDICINE

## 2020-08-13 PROCEDURE — 96366 THER/PROPH/DIAG IV INF ADDON: CPT

## 2020-08-13 PROCEDURE — 82948 REAGENT STRIP/BLOOD GLUCOSE: CPT

## 2020-08-13 PROCEDURE — 93321 DOPPLER ECHO F-UP/LMTD STD: CPT | Performed by: INTERNAL MEDICINE

## 2020-08-13 PROCEDURE — 85014 HEMATOCRIT: CPT

## 2020-08-13 PROCEDURE — 85025 COMPLETE CBC W/AUTO DIFF WBC: CPT | Performed by: STUDENT IN AN ORGANIZED HEALTH CARE EDUCATION/TRAINING PROGRAM

## 2020-08-13 PROCEDURE — 85027 COMPLETE CBC AUTOMATED: CPT | Performed by: ANESTHESIOLOGY

## 2020-08-13 PROCEDURE — 71045 X-RAY EXAM CHEST 1 VIEW: CPT

## 2020-08-13 PROCEDURE — 83605 ASSAY OF LACTIC ACID: CPT | Performed by: INTERNAL MEDICINE

## 2020-08-13 PROCEDURE — 93325 DOPPLER ECHO COLOR FLOW MAPG: CPT | Performed by: INTERNAL MEDICINE

## 2020-08-13 PROCEDURE — 84295 ASSAY OF SERUM SODIUM: CPT

## 2020-08-13 PROCEDURE — U0003 INFECTIOUS AGENT DETECTION BY NUCLEIC ACID (DNA OR RNA); SEVERE ACUTE RESPIRATORY SYNDROME CORONAVIRUS 2 (SARS-COV-2) (CORONAVIRUS DISEASE [COVID-19]), AMPLIFIED PROBE TECHNIQUE, MAKING USE OF HIGH THROUGHPUT TECHNOLOGIES AS DESCRIBED BY CMS-2020-01-R: HCPCS | Performed by: INTERNAL MEDICINE

## 2020-08-13 PROCEDURE — 82947 ASSAY GLUCOSE BLOOD QUANT: CPT

## 2020-08-13 PROCEDURE — 36600 WITHDRAWAL OF ARTERIAL BLOOD: CPT | Performed by: SOCIAL WORKER

## 2020-08-13 PROCEDURE — 80307 DRUG TEST PRSMV CHEM ANLYZR: CPT | Performed by: EMERGENCY MEDICINE

## 2020-08-13 PROCEDURE — 82803 BLOOD GASES ANY COMBINATION: CPT

## 2020-08-13 PROCEDURE — 87040 BLOOD CULTURE FOR BACTERIA: CPT | Performed by: INTERNAL MEDICINE

## 2020-08-13 PROCEDURE — 36415 COLL VENOUS BLD VENIPUNCTURE: CPT | Performed by: EMERGENCY MEDICINE

## 2020-08-13 PROCEDURE — 93308 TTE F-UP OR LMTD: CPT

## 2020-08-13 PROCEDURE — 99223 1ST HOSP IP/OBS HIGH 75: CPT | Performed by: INTERNAL MEDICINE

## 2020-08-13 RX ORDER — NICOTINE 21 MG/24HR
1 PATCH, TRANSDERMAL 24 HOURS TRANSDERMAL DAILY
Status: DISCONTINUED | OUTPATIENT
Start: 2020-08-13 | End: 2020-08-13

## 2020-08-13 RX ORDER — FUROSEMIDE 10 MG/ML
40 INJECTION INTRAMUSCULAR; INTRAVENOUS ONCE
Status: DISCONTINUED | OUTPATIENT
Start: 2020-08-13 | End: 2020-08-13

## 2020-08-13 RX ORDER — FUROSEMIDE 10 MG/ML
40 INJECTION INTRAMUSCULAR; INTRAVENOUS ONCE
Status: COMPLETED | OUTPATIENT
Start: 2020-08-13 | End: 2020-08-13

## 2020-08-13 RX ORDER — CIPROFLOXACIN 2 MG/ML
400 INJECTION, SOLUTION INTRAVENOUS ONCE
Status: COMPLETED | OUTPATIENT
Start: 2020-08-13 | End: 2020-08-13

## 2020-08-13 RX ORDER — FUROSEMIDE 10 MG/ML
40 INJECTION INTRAMUSCULAR; INTRAVENOUS
Status: DISCONTINUED | OUTPATIENT
Start: 2020-08-13 | End: 2020-08-13

## 2020-08-13 RX ORDER — ASPIRIN 300 MG/1
300 SUPPOSITORY RECTAL ONCE
Status: DISCONTINUED | OUTPATIENT
Start: 2020-08-13 | End: 2020-08-13

## 2020-08-13 RX ORDER — FUROSEMIDE 10 MG/ML
20 INJECTION INTRAMUSCULAR; INTRAVENOUS ONCE
Status: DISCONTINUED | OUTPATIENT
Start: 2020-08-13 | End: 2020-08-13

## 2020-08-13 RX ORDER — ASPIRIN 81 MG/1
81 TABLET ORAL DAILY
Status: DISCONTINUED | OUTPATIENT
Start: 2020-08-13 | End: 2020-08-21 | Stop reason: HOSPADM

## 2020-08-13 RX ORDER — ACETAMINOPHEN 325 MG/1
650 TABLET ORAL EVERY 6 HOURS PRN
Status: DISCONTINUED | OUTPATIENT
Start: 2020-08-13 | End: 2020-08-14

## 2020-08-13 RX ORDER — ATORVASTATIN CALCIUM 80 MG/1
80 TABLET, FILM COATED ORAL
Status: DISCONTINUED | OUTPATIENT
Start: 2020-08-13 | End: 2020-08-14

## 2020-08-13 RX ADMIN — ENOXAPARIN SODIUM 60 MG: 80 INJECTION SUBCUTANEOUS at 08:20

## 2020-08-13 RX ADMIN — CIPROFLOXACIN 400 MG: 2 INJECTION, SOLUTION INTRAVENOUS at 03:37

## 2020-08-13 RX ADMIN — AMIODARONE HYDROCHLORIDE 1 MG/MIN: 50 INJECTION, SOLUTION INTRAVENOUS at 00:05

## 2020-08-13 RX ADMIN — FUROSEMIDE 40 MG: 10 INJECTION, SOLUTION INTRAMUSCULAR; INTRAVENOUS at 15:30

## 2020-08-13 RX ADMIN — ATORVASTATIN CALCIUM 80 MG: 80 TABLET, FILM COATED ORAL at 15:30

## 2020-08-13 RX ADMIN — ACETAMINOPHEN 650 MG: 325 TABLET, FILM COATED ORAL at 23:36

## 2020-08-13 RX ADMIN — METRONIDAZOLE 500 MG: 500 INJECTION, SOLUTION INTRAVENOUS at 01:58

## 2020-08-13 RX ADMIN — FUROSEMIDE 40 MG: 10 INJECTION, SOLUTION INTRAMUSCULAR; INTRAVENOUS at 08:21

## 2020-08-13 RX ADMIN — ENOXAPARIN SODIUM 60 MG: 80 INJECTION SUBCUTANEOUS at 23:36

## 2020-08-13 RX ADMIN — MAGNESIUM SULFATE HEPTAHYDRATE 1 G: 1 INJECTION, SOLUTION INTRAVENOUS at 00:35

## 2020-08-13 RX ADMIN — ASPIRIN 81 MG: 81 TABLET, COATED ORAL at 08:21

## 2020-08-13 RX ADMIN — FUROSEMIDE 40 MG: 10 INJECTION, SOLUTION INTRAMUSCULAR; INTRAVENOUS at 09:46

## 2020-08-13 RX ADMIN — METRONIDAZOLE 500 MG: 500 INJECTION, SOLUTION INTRAVENOUS at 21:40

## 2020-08-13 NOTE — ASSESSMENT & PLAN NOTE
· Troponin peaked at 0 07 and then downtrended  · No ischemic changes on EKG and patient had recent cardiac cath   · Likely in the setting of Acute decompensated heart failure

## 2020-08-13 NOTE — ASSESSMENT & PLAN NOTE
· Patient with elevated TSH >7  · Free T4 normal  · Patient not taking any medication  · Follow-up outpatient PCP

## 2020-08-13 NOTE — CONSULTS
Cardiology   Orly Bright 62 y o  male MRN: [id number]  Unit/Bed#: [facility] 709-01 Encounter: [id number]        Reason for Consult / Principal Problem: Left Heart Cath    Physician Requesting Consult:  Brice Litten, MD    Cardiologist: n/a    PCP: No primary care provider on file  Assessment/Plan:    62 y o  male with PMHx of CAD s/p PCI 2008, HFrEF (<20% EF 7/20), EUNICE?, HTN, HLD, T2DM, GERD, CKD II, Tobacco use, recent multiple hospitalization for CHF exacerbation, who is a transfer from [facility] for cardiac evaluation  1) Acute Decompensated Heart Failure / Possible Sepsis     08/12/20 (!) 154 kg (339 lb 1 1 oz)   06/29/20 (!) 143 kg (314 lb 2 5 oz)   10/28/19 (!) 153 kg (337 lb)     -Multiple recent hospitalization for HF exacerbation  -Recent echo 06/26/2020 showing an EF of 20-25% with diffuse hypokinesis  -Respiratory distress w/ increase work of breathing, O2 sat 96% on 5 L NC, w/ b/l crackles in lung field and JVD  -Rapid was called to evaluate the need for BiPAP and increase the level of care   -Stabilized w/ 6 L NC, received total of 120 mg of IV lasix and transferred to Stepdown unit  -Holding off the lasix for now  -Urine Output of -3582ml   -avoid B-blockers  -WBC of 20K, Abnormal ABG  PH 7 398, PCO2 24 5, HCO3 15 1  -Blood culture, Lactic Acid pending  -Ceftriaxone and Flagyl on board  -Corona Negative  -Monitor Ins/Out  -Salt and fluid restriction      2) Sustained V-tach Episode    -Patient had a 32 seconds run of V-tach per EMS EN route to [address]  -Recent echo 06/26/2020 showing an EF of 20-25% with diffuse hypokinesis  -Started on amiodarone at [facility], but [address]   -EKG at [facility]- NSR, LAE, Prolonged QT  -Most Recent Potassium of 5 9  -Monitor electrolytes  -Monitor tele      3) Elevated Troponins    -7/20 [facility] from [facility] LAD 30%, 2nd Marginal 30% and Prox RCA 50% Stenosis   Recommended Medical Management  -presented to the ED w/ chest tightness  -Troponin elevation 0 07--> 04---> 10  -EKG- no ischemic changes, continue trend  -Troponemia likely due to acute decompensated HF       4) Coronary Artery Disease  -PCI 2008  --7/20 [facility] from [facility] LAD 30%, 2nd Marginal 30% and Prox RCA 50% Stenosis  Recommended Medical Management  -On Lipitor 80mg and ASA 81mg    5) Essential Hypertension    -Presented to ED w/ Elevated blood pressure  -BP range 161-93/  -On valsartan at home  -Holding Off in setting of LUCY and Lasix use (currently holding off)  -avoid b-blockers in setting of acute decompensated HF  -can use vasodilators and diuretics    CC: SOB    HPI:  62 y o  male with PMHx of CAD s/p PCI 2008, HFrEF (<20% EF 7/20), EUNICE?, HTN, HLD, T2DM, GERD, CKD II, Tobacco use, recent multiple hospitalization for CHF exacerbation, who is a transfer from [facility] for cardiac evaluation  He has been having worsening SOB, orthopnea, multiple PND episodes, LE swelling, weight gain, chest tightness with exertion associated w/ palpitations  He is chronic smoker but quit in June  He denies any recent Alcohol use but used to drink years ago  He work as a truck /bus drive  He could not follow up w/ Cardiology after his last discharge because his car broke down  As per EMS, he had an episode of Sustained V-tach (32 s) and started on Amiodarone drip en route to the hospital  At [facility], he did not have any ischemic changes on EKG but elevated troponin  07, w/ evelated D-Dimers 1 53 and subsequently got CTA PE study which showed No embolism and also mild to moderate pericardial effusion noted  He was transferred to [facility] for Cardiac Catherization however he got the study done at [facility] in July     This morning when I went to see him he was in respiratory distress w/ increase work of breathing, O2 sat 96% on 5 L NC, w/ b/l crackles in lung field and JVD, therefore Rapid was called to evaluate the need for BiPAP and increase the level of care in the setting of Acute Decompensated Heart Failure  He was given 2 doses of 40 mg Lasix injection w/ adequate subjective response  Physical exam  Objective   Vitals: Blood pressure (!) 136/102, pulse 98, resp  rate 18, height 5' 9 5" (1 765 m), SpO2 99 %  Orthostatic Blood Pressures      Most Recent Value   Blood Pressure  (!) 136/102 filed at 2020 1040        General:  AO x3, In acute respriatory distress  Cardiac:  S1-S2 normal  No murmurs, rubs or gallops, JVP Elevated  Lungs:  Clear to auscultation bilaterally, no wheezing or crackles  Abdomen:  Soft, nontender, nondistended  Extremities:  Warm, well perfused, pulses palpable, no ulcers or rashes  Edema:2+  Neuro: Grossly nonfocal  Psych:  Normal affect  HEENT:  EOM normal, pupils equal and reactive to light  Neck: no palpable mass, no bruits  Skin:  no rashes (comprehensive skin exam not performed)       ======================================================  TREADMILL STRESS  No results found for this or any previous visit    ----------------------------------------------------------------------------------------------  NUCLEAR STRESS TEST: No results found for this or any previous visit    No results found for this or any previous visit     --------------------------------------------------------------------------------  CATH:  No results found for this or any previous visit   --------------------------------------------------------------------------------  ECHO:   Results for orders placed during the hospital encounter of 20   Echo complete with contrast if indicated    Narrative [address]  [place] , [place]  [phone]    Transthoracic Echocardiogram  2D, M-mode, Doppler, and Color Doppler    Study date:  2020    Patient: Luis Priest  MR number: [id number]  Account number: [de-identified]  : 1965  Age: 62 years  Gender: Male  Status: Inpatient  Location: Emergency room  Height: 69 in  Weight: 334 4 lb  BP: 130/ 96 mmHg    Indications: congestive heart failure, increased shortness of breath    Diagnoses: 428 0 - CONGESTIVE HEART FAILURE, R06 02 - Shortness of breath    Sonographer:  Verlee Kawasaki, RDCS  Primary Physician:  Wild Ashford MD  Referring Physician:  Toñito Maharaj MD  Group:  [facility]  Interpreting Physician:  Brittanie Rubalcava MD    SUMMARY    PROCEDURE INFORMATION:  This was a technically difficult study  LEFT VENTRICLE:  Systolic function was severely reduced by visual assessment  Ejection fraction was estimated in the range of 20 % to 25 %  There appeared to be severe diffuse hypokinesis  This study was inadequate for a more detailed evaluation of regional wall motion  Wall thickness was mildly increased  There was mild concentric hypertrophy  Features were consistent with a pseudonormal left ventricular filling pattern, with concomitant abnormal relaxation and increased filling pressure (grade 2 diastolic dysfunction)  RIGHT VENTRICLE:  The size was normal   Systolic function was normal     LEFT ATRIUM:  The atrium was mildly dilated  RIGHT ATRIUM:  The atrium was moderately dilated  MITRAL VALVE:  There was mild regurgitation  TRICUSPID VALVE:  There was mild to moderate regurgitation  PERICARDIUM:  A trace pericardial effusion was identified  HISTORY: PRIOR HISTORY: morbid obesity, obstructive lung disease, hypertension, diabetes mellitus, current  smoker    PROCEDURE: The procedure was performed in the emergency room  This was a stat study  The transthoracic approach was used  The study included complete 2D imaging, M-mode, complete spectral Doppler, and color Doppler  Images were obtained  from the parasternal, apical, subcostal, and suprasternal notch acoustic windows   Echocardiographic views were limited due to restricted patient mobility, poor acoustic window availability, decreased penetration, and lung interference  This was a technically difficult study  LEFT VENTRICLE: Size was normal  Systolic function was severely reduced by visual assessment  Ejection fraction was estimated in the range of 20 % to 25 %  There appeared to be severe diffuse hypokinesis  This study was inadequate for a  more detailed evaluation of regional wall motion  Wall thickness was mildly increased  There was mild concentric hypertrophy  DOPPLER: Features were consistent with a pseudonormal left ventricular filling pattern, with concomitant abnormal  relaxation and increased filling pressure (grade 2 diastolic dysfunction)  RIGHT VENTRICLE: The size was normal  Systolic function was normal  Wall thickness was normal     LEFT ATRIUM: The atrium was mildly dilated  RIGHT ATRIUM: The atrium was moderately dilated  MITRAL VALVE: Valve structure was normal  There was normal leaflet separation  DOPPLER: The transmitral velocity was within the normal range  There was no evidence for stenosis  There was mild regurgitation  AORTIC VALVE: The valve was trileaflet  Leaflets exhibited mildly increased thickness, mild calcification, and normal cuspal separation  DOPPLER: Transaortic velocity was within the normal range  There was no evidence for stenosis  There  was no significant regurgitation  TRICUSPID VALVE: The valve structure was normal  There was normal leaflet separation  DOPPLER: The transtricuspid velocity was within the normal range  There was no evidence for stenosis  There was mild to moderate regurgitation  Estimated  peak PA pressure was 50 mmHg  PULMONIC VALVE: Leaflets exhibited normal thickness, no calcification, and normal cuspal separation  DOPPLER: The transpulmonic velocity was within the normal range  There was no significant regurgitation  PERICARDIUM: A trace pericardial effusion was identified  The pericardium was normal in appearance  AORTA: The root exhibited normal size      SYSTEMIC VEINS: IVC: The inferior vena cava was normal in size  Respirophasic changes were normal     MEASUREMENT TABLES    OTHER ECHO MEASUREMENTS  (Reference normals)  Estimated CVP   10 mmHg   (--)    SYSTEM MEASUREMENT TABLES    2D  %FS: 6 96 %  Ao Diam: 2 95 cm  EDV(Teich): 166 92 ml  EF(Teich): 15 3 %  ESV(Teich): 141 38 ml  IVSd: 1 16 cm  LA Area: 16 33 cm2  LA Diam: 4 74 cm  LVEDV MOD A4C: 196 82 ml  LVEF MOD A4C: 25 23 %  LVESV MOD A4C: 147 17 ml  LVIDd: 5 81 cm  LVIDs: 5 4 cm  LVLd A4C: 9 12 cm  LVLs A4C: 8 77 cm  LVPWd: 1 28 cm  RA Area: 35 13 cm2  RVIDd: 4 32 cm  RWT: 0 44  SV MOD A4C: 49 65 ml  SV(Teich): 25 54 ml    CW  AV Vmax: 1 3 m/s  AV maxP 8 mmHg  PV Vmax: 1 1 m/s  PV maxP 84 mmHg  TR Vmax: 3 17 m/s  TR maxP 43 mmHg    MM  EPSS: 2 25 cm  TAPSE: 1 86 cm    PW  E' Lat: 0 08 m/s  E' Sept: 0 08 m/s  LVOT Vmax: 0 52 m/s  LVOT maxP 1 mmHg  RVOT Vmax: 0 67 m/s  RVOT maxP 79 mmHg    [facility]    Prepared and electronically signed by    Mike Bardales MD  Signed 2020 18:38:15       No results found for this or any previous visit   --------------------------------------------------------------------------------  HOLTER  No results found for this or any previous visit   --------------------------------------------------------------------------------  CAROTIDS  No results found for this or any previous visit  [unfilled]   ======================================================          Review of Systems   Constitutional: Positive for activity change and unexpected weight change  Negative for chills, diaphoresis, fatigue and fever  Respiratory: Positive for cough, chest tightness and shortness of breath  Negative for choking  Cardiovascular: Positive for chest pain, palpitations and leg swelling  ROS as noted above, otherwise 12 point review of systems was performed and is negative       Historical Information   Past Medical History:   Diagnosis Date    Asthma     Back pain     Cardiomyopathy     Carpal tunnel syndrome     Chronic combined systolic and diastolic CHF (congestive heart failure)     COPD (chronic obstructive pulmonary disease)     GERD (gastroesophageal reflux disease)     Hypertension     Knee pain     Neck pain      Past Surgical History:   Procedure Laterality Date    CARPAL TUNNEL RELEASE      HAND SURGERY       Social History     Substance and Sexual Activity   Alcohol Use Yes    Comment: occasional     Social History     Substance and Sexual Activity   Drug Use Yes    Types: Marijuana    Comment: periodically/on ocassion     Social History     Tobacco Use   Smoking Status Current Every Day Smoker    Packs/day: 0 50    Years: 36 00    Pack years: 18 00    Types: Cigarettes   Smokeless Tobacco Never Used     Family History   Problem Relation Age of Onset    Cancer Mother     Hypertension Mother     Hypertension Father     Gout Father     Heart attack Father     Heart disease Father     Lung cancer Sister        Meds/Allergies   Hospital Medications:   Current Facility-Administered Medications   Medication Dose Route Frequency    acetaminophen (TYLENOL) tablet 650 mg  650 mg Oral Q6H PRN    aspirin (ECOTRIN LOW STRENGTH) EC tablet 81 mg  81 mg Oral Daily    atorvastatin (LIPITOR) tablet 80 mg  80 mg Oral Daily With Dinner    enoxaparin (LOVENOX) subcutaneous injection 60 mg  60 mg Subcutaneous BID    furosemide (LASIX) injection 20 mg  20 mg Intravenous Once    furosemide (LASIX) injection 40 mg  40 mg Intravenous BID (diuretic)    trimethobenzamide (TIGAN) IM injection 200 mg  200 mg Intramuscular Q6H PRN     Home Medications:   Medications Prior to Admission   Medication    cyclobenzaprine (FLEXERIL) 10 mg tablet    diclofenac (VOLTAREN) 75 mg EC tablet    irbesartan-hydrochlorothiazide (AVALIDE) 300-12 5 MG per tablet    meloxicam (MOBIC) 15 mg tablet    methocarbamol (ROBAXIN) 750 mg tablet    pregabalin (LYRICA) 200 MG capsule       No Known Allergies      Portions of the record may have been created with voice recognition software  Occasional wrong words or "sound a like" substitutions may have occurred due to the inherent limitations of voice recognition software  Read the chart carefully and recognize, using context, where substitutions have occurred

## 2020-08-13 NOTE — RAPID RESPONSE
Progress Note - Rapid Response   Lesa Gavin 62 y o  male MRN: [id number]    Time Called ( Time): 1899  Date Called: 8/13  Level of Care: SD2  Room#: 769  [id number] Time ( Time): 0930  Event End Time ( Time): 2444  Primary reason for call: Acute change in RR/increased WOB  Interventions:  Airway/Breathing:  O2 Mask/Nasal, ABG and CXR  Circulation: N/A  Other Treatments: N/A       Assessment:   1  Acute decompensated HF with increased WOB and tachypnea  Patient c/o SOB  O2 100% on 6L NC  Hemodynamics stable  Cardiology at bedside  Plan:   · Echo in process  · Lasix 40mg IV given just prior to rapid and patient with one large incontinent occurrence, patient given 2nd dose of 40mg IV Lasix during rapid  · Continue nasal cannula oxygen, patient's WOB improved throughout rapid, if he develops increased WOB can utilize bipap  · CXR ordered  · ABG done-WNL   · Cardiology and SOB at bedside, remainder of care per primary team, continue SD2, call critical care with any concerns of decompensation        HPI/Chief Complaint (Background/Situation): Per Fabio Howard MD  H&P 8/12 189:  "49-year-old morbidly obese male with significant past medical history of CAD s/p PCI 0789, combined systolic and diastolic heart failure EF of 20-25% (6/26/20), hypertension, hyperlipidemia, type 2 diabetes, lumbar stenosis with radiculopathy and chronic pain, GERD, tobacco abuse, hx of noncompliance who presents to the ED at [facility] complaining of progressive dyspnea over the last month along with chest tightness and some lower extremity edema        At [facility],  Patient was noted to have V-tach by EMS for 32 seconds  Patient was started on amiodarone  Initial workup showing troponin elevation of 0 07, no EKG changes consistent with ischemia    D-dimer elevated 1 53, CT a chest negative for PE however findings of gallbladder wall thickening concerning for cholecystitis as well as bilateral striated nephrograms concerning for pyelonephritis versus atypical infiltrating process  Mild-to-moderate pericardial effusion also noted  Patient does not have any complaints of flank or right upper quadrant pain  Denies dysuria/ frequency  Due to the above findings, patient was not started on heparin drip      Patient was recently admitted to the hospital 06/26/2020 for similar presentation and declined heart catheterization at that time  Currently patient agrees to have cardiac catheterization       Patient will be transferred to [facility] for cardiac catheterization    Will keep NPO, trend troponins/ EKGs, tele "    Historical Information   Past Medical History:   Diagnosis Date    Asthma     Back pain     Cardiomyopathy     Carpal tunnel syndrome     Chronic combined systolic and diastolic CHF (congestive heart failure)     COPD (chronic obstructive pulmonary disease)     GERD (gastroesophageal reflux disease)     Hypertension     Knee pain     Neck pain      Past Surgical History:   Procedure Laterality Date    CARPAL TUNNEL RELEASE      HAND SURGERY       Social History   Social History     Substance and Sexual Activity   Alcohol Use Yes    Comment: occasional     Social History     Substance and Sexual Activity   Drug Use Yes    Types: Marijuana    Comment: periodically/on ocassion     Social History     Tobacco Use   Smoking Status Current Every Day Smoker    Packs/day: 0 50    Years: 36 00    Pack years: 18 00    Types: Cigarettes   Smokeless Tobacco Never Used     Meds/Allergies   Current Facility-Administered Medications   Medication Dose Route Frequency Provider Last Rate    acetaminophen  650 mg Oral Q6H PRN Minerva Luis MD      aspirin  81 mg Oral Daily Minerva Luis MD      atorvastatin  80 mg Oral Daily With Agnela Dunn MD      enoxaparin  60 mg Subcutaneous BID Minerva Luis MD      furosemide  20 mg Intravenous Once Gary Marti MD      furosemide  40 mg Intravenous BID (diuretic) Kae Watson MD      trimethobenzamide  200 mg Intramuscular Q6H PRN Kae Watson MD              No Known Allergies    ROS: Negative except "congested breathing"    Vitals:    08/13/20 0737 08/13/20 0934 08/13/20 0944 08/13/20 1002   BP: 139/96  (!) 161/123 (!) 134/102   Pulse: 94  101 100   Resp: 18      SpO2: 98% 96% 99% 100%       Physical Exam:  Gen: appears to be in mild discomfort  HEENT:normocephalic  Neck: supple  Chest: tachycardic rate, regular rhythm   Cor: diminished breath sounds, tachypneic   Abd: large obese abdomen  Ext: normal strength  Neuro: GCS 15, no deficits   Skin: warm, dry, intact       Intake/Output Summary (Last 24 hours) at 8/13/2020 1023  Last data filed at 8/13/2020 0730  Gross per 24 hour   Intake 0 ml   Output    Net 0 ml       Respiratory    Lab Data (Last 4 hours)    None         O2/Vent Data (Last 4 hours)    None              Invasive Devices     Peripheral Intravenous Line            Peripheral IV 06/28/20 Dorsal (posterior); Right Hand 45 days    Peripheral IV 08/12/20 Left Antecubital less than 1 day    Peripheral IV 08/12/20 Right Antecubital less than 1 day    Peripheral IV 08/13/20 Right Wrist less than 1 day                DIAGNOSTIC DATA:    Lab: I have personally reviewed pertinent lab results     CBC:   Results from last 7 days   Lab Units 08/13/20  0953   WBC Thousand/uL 21 30*   HEMOGLOBIN g/dL 16 5   HEMATOCRIT % 52 7*   PLATELETS Thousands/uL 310     CMP:   Results from last 7 days   Lab Units 08/13/20  0939 08/12/20  2251   POTASSIUM mmol/L  --  3 9   CHLORIDE mmol/L  --  104   CO2 mmol/L  --  22   CO2, I-STAT mmol/L 16*  --    BUN mg/dL  --  21   CREATININE mg/dL  --  1 55*   CALCIUM mg/dL  --  8 3   ALK PHOS U/L  --  72   ALT U/L  --  46   AST U/L  --  40   GLUCOSE, ISTAT mg/dl 80  --      PT/INR:   No results found for: PT, INR,   Magnesium: No components found for: MAG,   Phosphorous: No results found for: PHOS    Microbiology:  No results found for: [facility] SPUTUMCULTMERVAT      OUTCOME:   Stayed in room   Family notified of transfer: N/A  Family member contacted: N/A  Code Status: Level 1 - Full Code  Critical Care Time: Total Critical Care time spent 0 minutes excluding procedures, teaching and family updates

## 2020-08-13 NOTE — ASSESSMENT & PLAN NOTE
· Vitamin-D level around 13  · Recommended vitamin-D supplementation/ dietary changes/sun exposure  · Follow-up outpatient

## 2020-08-13 NOTE — ASSESSMENT & PLAN NOTE
Wt Readings from Last 3 Encounters:   08/12/20 (!) 154 kg (339 lb 1 1 oz)   06/29/20 (!) 143 kg (314 lb 2 5 oz)   10/28/19 (!) 153 kg (337 lb)     · Echo 06/26/2020-- EF of 15% and Grade 2 DD  · Mild to moderate pericardial effusion on CTA chest  · Patient is on 1mg of bumex daily, will be continued and appears euvolemic currently   · Making good urine  · Cardiac MRI read pending   · Also needs [employer] prior to discharge

## 2020-08-13 NOTE — H&P
INTERNAL MEDICINE RESIDENCY ADMISSION H&P     Name: Gianfranco Perkins   Age & Sex: 62 y o  male   MRN: [id number]  Unit/Bed#:    Encounter: [id number]  Primary Care Provider: No primary care provider on file      Code Status: Prior  Admission Status: INPATIENT   Admit To: SOD Team B   Disposition: Patient requires Med/Surg with Telemetry    ASSESSMENT/PLAN     Principal Problem:    Sustained VT (ventricular tachycardia) ([facility])  Active Problems:    Spondylolisthesis at L4-L5 level    Foraminal stenosis of lumbar region    Chronic obstructive lung disease (HCC)    Chronic pain    Diabetes mellitus (HCC)    GERD (gastroesophageal reflux disease)    Hypertriglyceridemia    Morbid obesity ([facility] )    Essential hypertension    Tobacco use disorder    Acute combined systolic and diastolic congestive heart failure ([facility])        * Sustained VT (ventricular tachycardia) ([facility] [address] )  Assessment & Plan  Patient had 32 seconds run of V-tach per EMS EN route to [place]  Likely secondary to ischemic cardiomyopathy  Recent echo 06/26/2020 showing an EF of 20-25% with diffuse hypokinesis  Started on amiodarone at [facility], currently on amio drip   Monitor electrolytes  Cardiology consult  Monitor tele      Acute combined systolic and diastolic congestive heart failure ([facility] [address] )  Assessment & Plan  Wt Readings from Last 3 Encounters:   08/12/20 (!) 154 kg (339 lb 1 1 oz)   06/29/20 (!) 143 kg (314 lb 2 5 oz)   10/28/19 (!) 153 kg (337 lb)     Troponin elevation 0 07, continue trend  EKG- no ischemic changes, continue trend  Echo 06/26/2020-- EF of 20 25%, diffuse hypokinesis, at time patient declined cardiac catheterization  Patient states that he is willing to have cardiac catheterization this time around due to dyspnea/chest tightness during V-tach  Mild to moderate pericardial effusion on CTA chest  Follow up echo prior to IV diuresing   Cardiology consult, appreciate input  Keep NPO  Continue amiodarone for now  Daily weights  I/Os    Morbid obesity ([address] )  Assessment & Plan  Advise Weight loss /bariatric surgery  Follow-up palpation  Educated on lifestyle modification      NSTEMI (non-ST elevated myocardial infarction) [facility])  Assessment & Plan  Troponin elevation 0 07, continue trend  EKG- no ischemic changes, continue trend  Consider heparin drip  Aspirin load  Nitro p r n  For pain  Consult cardiology  Tele  NPO, possible cardiac catheterization tomorrow    Essential hypertension  Assessment & Plan  Patient on home ARB-HCTZ 300-12 5 mg daily  Will continue  Monitor BMP, BP    Pericardial effusion  Assessment & Plan  Likely secondary to CHF exacerbation/ ischemic cardiomyopathy  -pt is having SOB on 5L NC SpO2 >97%,  -on amio drip  -mild to moderate pleural effusion noted on CTA chest  -recommend diuresing and monitoring pericardial effusion   Will hold for echo   -cardiology consult/ heart failure consult  -tele      Pre-diabetes  Assessment & Plan   hemoglobin A1c 6 2  -educated on weight loss/dietary modification  -not currently on any medication    CKD (chronic kidney disease) stage 2, GFR 60-89 ml/min  Assessment & Plan  Creatinine 1 55 on presentation,   Baseline creatinine- 1 3-1 4  Monitor BMP    Hypothyroidism (acquired)  Assessment & Plan  Patient with elevated TSH >7  Follow-up T4  Patient not taking any medication  Follow-up outpatient PCP    GERD (gastroesophageal reflux disease)  Assessment & Plan  Not on home medications  Follow up out pt pcp  Avoid NSAIDs and triggering foods  Weight loss advised    Hyperlipidemia  Assessment & Plan  Patient not taking any medication  -start high-intensity statin    Tobacco use disorder  Assessment & Plan  Smoking cessation advised  States he quit last month      Vitamin D deficiency  Assessment & Plan  Vitamin-D level around 13  -recommended vitamin-D supplementation/ dietary changes/sun exposure  Follow-up outpatient        VTE Pharmacologic Prophylaxis: Heparin SC  VTE Mechanical Prophylaxis: sequential compression device    CHIEF COMPLAINT   No chief complaint on file  HISTORY OF PRESENT ILLNESS     Patient is a 52-year-old morbidly obese male with significant past medical history of CAD s/p PCI 3032, combined systolic and diastolic heart failure EF of 20-25% (6/26/20), hypertension, hyperlipidemia, type 2 diabetes, lumbar stenosis with radiculopathy and chronic pain, GERD, tobacco abuse, hx of noncompliance who presents to the ED at [facility] complaining of progressive dyspnea over the last month along with chest tightness and some lower extremity edema  At [facility],  Patient was noted to have V-tach by EMS for 32 seconds  Patient was started on amiodarone  Initial workup showing troponin elevation of 0 07, no EKG changes consistent with ischemia  D-dimer elevated 1 53, CT a chest negative for PE however findings of gallbladder wall thickening concerning for cholecystitis as well as bilateral striated nephrograms concerning for pyelonephritis versus atypical infiltrating process  Mild-to-moderate pericardial effusion also noted  Patient does not have any complaints of flank or right upper quadrant pain  Denies dysuria/ frequency  Due to the above findings, patient was not started on heparin drip  Patient was recently admitted to the hospital 06/26/2020 for similar presentation and declined heart catheterization at that time  Currently patient agrees to have cardiac catheterization  Patient will be transferred to [facility] for cardiac catheterization  Will keep NPO, trend troponins/ EKGs, tele  On presentation at  [facility]--  Pt examined at bedside  Vital signs stable  Sitting upright, labored breathing, on 5 L nasal cannula satting >96%  Bilateral crackles in lung fields  JVD difficult to assess due to body habitus  Bilateral lower extremity edema +1  Distended abdomen, soft nontender  Patient asking for ice chips due to dry mouth/ not breathing    Patient states he quit smoking about 1 month ago  Denies alcohol or drug use  Will keep NPO  Follow-up echo, currently on amio drip  Tele  Appreciate cardiology in heart failure input  Follow troponin trend/EKGs  Not on heparin drip at this time  Will discontinue Cipro as not likely any source of infection (IE pyelonephritis/cholecystitis)  Patient denies any complaints consistent with these items  Segmented neutrophils 51%  REVIEW OF SYSTEMS     Review of Systems   Constitutional: Positive for activity change  HENT: Negative  Eyes: Negative  Respiratory: Positive for chest tightness and shortness of breath  Gastrointestinal: Positive for abdominal distention  Negative for abdominal pain  Endocrine: Negative  Genitourinary: Negative  Musculoskeletal: Negative  Allergic/Immunologic: Negative  Neurological: Negative  Hematological: Negative  Psychiatric/Behavioral: Negative  OBJECTIVE   There were no vitals filed for this visit  Temperature:   Temp (24hrs), Av 4 °F (36 9 °C), Min:98 4 °F (36 9 °C), Max:98 4 °F (36 9 °C)       Intake & Output:  I/O     None        No intake or output data in the 24 hours ending 20 0219  No intake/output data recorded  Weights: There is no height or weight on file to calculate BMI  Weight (last 2 days)     None        Physical Exam  Vitals signs and nursing note reviewed  Constitutional:       General: He is in acute distress  Appearance: He is obese  HENT:      Head: Normocephalic and atraumatic  Nose: Nose normal       Mouth/Throat:      Mouth: Mucous membranes are dry  Eyes:      Extraocular Movements: Extraocular movements intact  Conjunctiva/sclera: Conjunctivae normal       Pupils: Pupils are equal, round, and reactive to light  Neck:      Musculoskeletal: Normal range of motion  Cardiovascular:      Rate and Rhythm: Normal rate and regular rhythm  Pulses: Normal pulses        Heart sounds: Murmur present  Pulmonary:      Effort: Pulmonary effort is normal  No respiratory distress  Breath sounds: Rhonchi present  No rales  Comments: 5 L nasal cannula, labored breathing, sitting upright  Abdominal:      General: Bowel sounds are normal  There is distension  Tenderness: There is no abdominal tenderness  Musculoskeletal: Normal range of motion  General: Swelling present  No tenderness  Skin:     General: Skin is warm and dry  Capillary Refill: Capillary refill takes less than 2 seconds  Neurological:      General: No focal deficit present  Mental Status: He is alert and oriented to person, place, and time  Mental status is at baseline  Psychiatric:         Mood and Affect: Mood normal          Behavior: Behavior normal          Thought Content:  Thought content normal          Judgment: Judgment normal        PAST MEDICAL HISTORY     Past Medical History:   Diagnosis Date    Asthma     Back pain     Cardiomyopathy     Carpal tunnel syndrome     Chronic combined systolic and diastolic CHF (congestive heart failure)     COPD (chronic obstructive pulmonary disease)     GERD (gastroesophageal reflux disease)     Hypertension     Knee pain     Neck pain      PAST SURGICAL HISTORY     Past Surgical History:   Procedure Laterality Date    CARPAL TUNNEL RELEASE      HAND SURGERY       SOCIAL & FAMILY HISTORY     Social History     Substance and Sexual Activity   Alcohol Use Yes    Comment: occasional     Social History     Substance and Sexual Activity   Drug Use Yes    Types: Marijuana    Comment: periodically/on ocassion     Social History     Tobacco Use   Smoking Status Current Every Day Smoker    Packs/day: 0 50    Years: 36 00    Pack years: 18 00    Types: Cigarettes   Smokeless Tobacco Never Used     Family History   Problem Relation Age of Onset    Cancer Mother     Hypertension Mother     Hypertension Father     Gout Father     Heart attack Father  Heart disease Father     Lung cancer Sister      LABORATORY DATA     Labs: I have personally reviewed pertinent reports  Results from last 7 days   Lab Units 08/12/20  2251   WBC Thousand/uL 12 16*   HEMOGLOBIN g/dL 14 9   HEMATOCRIT % 46 9   PLATELETS Thousands/uL 287   MONO PCT % 10      Results from last 7 days   Lab Units 08/12/20  2251   POTASSIUM mmol/L 3 9   CHLORIDE mmol/L 104   CO2 mmol/L 22   BUN mg/dL 21   CREATININE mg/dL 1 55*   CALCIUM mg/dL 8 3   ALK PHOS U/L 72   ALT U/L 46   AST U/L 40     Serum creatinine: 1 55 mg/dL (H) 08/12/20 2251  Estimated creatinine clearance: 78 3 mL/min (A)   Results from last 7 days   Lab Units 08/12/20  2251   MAGNESIUM mg/dL 1 7                  Results from last 7 days   Lab Units 08/12/20 2251   TROPONIN I ng/mL 0 07*     Micro:  No results found for: Honorio Better, WOUNDCULT, SPUTUMCULTUR  IMAGING & DIAGNOSTIC TESTS     Imaging: I have personally reviewed pertinent reports  Pe Study With Ct Abdomen And Pelvis With Contrast    Result Date: 8/13/2020  Impression: Extensive bilateral striated nephrograms concerning for pyelonephritis versus atypical infiltrating process  Recommend follow-up to resolution  Mild to moderate pericardial effusion  Gallbladder wall is thickened and enhancing concerning for cholecystitis  Workstation performed: [id number]     EKG, Pathology, and Other Studies: I have personally reviewed pertinent reports  ALLERGIES   No Known Allergies  MEDICATIONS PRIOR TO ARRIVAL     Prior to Admission medications    Medication Sig Start Date End Date Taking?  Authorizing Provider   cyclobenzaprine (FLEXERIL) 10 mg tablet Take 1 tablet (10 mg total) by mouth 2 (two) times a day as needed for muscle spasms 10/23/19   Olga Adame DO   diclofenac (VOLTAREN) 75 mg EC tablet Take 1 tablet (75 mg total) by mouth 2 (two) times a day 4/9/20   Olga Adame DO   irbesartan-hydrochlorothiazide (AVALIDE) 300-12 5 MG per tablet irbesartan 300 mg-hydrochlorothiazide 12 5 mg tablet    Historical Provider, MD   meloxicam (MOBIC) 15 mg tablet meloxicam 15 mg tablet    Historical Provider, MD   methocarbamol (ROBAXIN) 750 mg tablet Take 1 tablet (750 mg total) by mouth 3 (three) times a day as needed for muscle spasms  Patient not taking: Reported on 6/26/2020 10/18/19   Olga Adame DO   pregabalin (LYRICA) 200 MG capsule Take 200 mg by mouth daily     Historical Provider, MD     MEDICATIONS ADMINISTERED IN LAST 24 HOURS     CURRENT MEDICATIONS     Facility-Administered Medications Ordered in Other Encounters   Medication Dose Route Frequency Provider Last Rate    amiodarone  1 mg/min Intravenous Continuous Janiya Les, DO 1 mg/min (08/13/20 0005)    amiodarone (FOR EMS ONLY)  1 each Does not apply Once Triage Protocol Emergency, MD      ciprofloxacin  400 mg Intravenous Once Caresse Buerger Sully,       metroNIDAZOLE  500 mg Intravenous Once Marlin Les,  mg (08/13/20 0158)     No current facility-administered medications for this encounter  Admission Time  I spent 45 minutes admitting the patient    This involved direct patient contact where I performed a full history and physical, reviewing previous records, and reviewing laboratory and other diagnostic studies     ==  Pascual Munson MD  Resident, PGY-2  [facility] Internal Medicine Residency

## 2020-08-13 NOTE — CODE DOCUMENTATION
Rapid response called for respiratory distress CP over last several hours  Patient stating 96% on 6L/min nc BIPAP ordered  I stat obtained

## 2020-08-13 NOTE — ASSESSMENT & PLAN NOTE
· Patient had 32 seconds run of V-tach per EMS EN route to [facility]  · Likely secondary to ischemic cardiomyopathy  · Recent echo 06/26/2020 showing an EF of 20-25% with diffuse hypokinesis  · Started on amiodarone at [facility], currently off drip  · Monitor electrolytes  · Cardiology following  · Monitor tele  · Needs [facility] prior to discharge

## 2020-08-13 NOTE — ASSESSMENT & PLAN NOTE
· Not on home medications  · Follow up out pt pcp  · Avoid NSAIDs and triggering foods  · Weight loss advised

## 2020-08-13 NOTE — ASSESSMENT & PLAN NOTE
· Advise Weight loss /bariatric surgery  · Follow-up palpation  · Educated on lifestyle modification

## 2020-08-13 NOTE — ASSESSMENT & PLAN NOTE
· Creatinine 1 55 on presentation currently improved and back to baseline around 1 3-1 4  · Monitor BMP

## 2020-08-13 NOTE — PLAN OF CARE
Problem: Potential for Falls  Goal: Patient will remain free of falls  Description: INTERVENTIONS:  - Assess patient frequently for physical needs  -  Identify cognitive and physical deficits and behaviors that affect risk of falls    -  [place] fall precautions as indicated by assessment   - Educate patient/family on patient safety including physical limitations  - Instruct patient to call for assistance with activity based on assessment  - Modify environment to reduce risk of injury  - Consider OT/PT consult to assist with strengthening/mobility  8/13/2020 1055 by Pham Schofield RN  Outcome: Progressing  8/13/2020 1055 by Pham Schofield RN  Outcome: Progressing     Problem: CARDIOVASCULAR - ADULT  Goal: Maintains optimal cardiac output and hemodynamic stability  Description: INTERVENTIONS:  - Monitor I/O, vital signs and rhythm  - Monitor for S/S and trends of decreased cardiac output  - Administer and titrate ordered vasoactive medications to optimize hemodynamic stability  - Assess quality of pulses, skin color and temperature  - Assess for signs of decreased coronary artery perfusion  - Instruct patient to report change in severity of symptoms  8/13/2020 1055 by Pham Schofield RN  Outcome: Progressing  8/13/2020 1055 by Pham Schofield RN  Outcome: Progressing  Goal: Absence of cardiac dysrhythmias or at baseline rhythm  Description: INTERVENTIONS:  - Continuous cardiac monitoring, vital signs, obtain 12 lead EKG if ordered  - Administer antiarrhythmic and heart rate control medications as ordered  - Monitor electrolytes and administer replacement therapy as ordered  8/13/2020 1055 by Pham Schofield RN  Outcome: Progressing  8/13/2020 1055 by Pham Schofield RN  Outcome: Progressing     Problem: PAIN - ADULT  Goal: Verbalizes/displays adequate comfort level or baseline comfort level  Description: Interventions:  - Encourage patient to monitor pain and request assistance  - Assess pain using appropriate pain scale  - Administer analgesics based on type and severity of pain and evaluate response  - Implement non-pharmacological measures as appropriate and evaluate response  - Consider cultural and social influences on pain and pain management  - Notify physician/advanced practitioner if interventions unsuccessful or patient reports new pain  8/13/2020 1055 by Lazara Pacheco RN  Outcome: Progressing  8/13/2020 1055 by Lazara Pacheco RN  Outcome: Progressing     Problem: INFECTION - ADULT  Goal: Absence or prevention of progression during hospitalization  Description: INTERVENTIONS:  - Assess and monitor for signs and symptoms of infection  - Monitor lab/diagnostic results  - Monitor all insertion sites, i e  indwelling lines, tubes, and drains  - [place] appropriate cooling/warming therapies per order  - Administer medications as ordered  - Instruct and encourage patient and family to use good hand hygiene technique  - Identify and instruct in appropriate isolation precautions for identified infection/condition  8/13/2020 1055 by Lazara Pacheco RN  Outcome: Progressing  8/13/2020 1055 by Lazara Pacheco RN  Outcome: Progressing  Goal: Absence of fever/infection during neutropenic period  Description: INTERVENTIONS:  - Monitor WBC    8/13/2020 1055 by Lazara Pacheco RN  Outcome: Progressing  8/13/2020 1055 by Lazara Pacheco RN  Outcome: Progressing

## 2020-08-13 NOTE — EMTALA/ACUTE CARE TRANSFER
[facility] EMERGENCY DEPARTMENT  [address] [place] [id number]  Dept: [address] [place] TRANSFER CONSENT    NAME Lesa Gavin                                         1965                              MRN [id number]    I have been informed of my rights regarding examination, treatment, and transfer   by Dr Yesica Abad DO    Benefits: Specialized equipment and/or services available at the receiving facility (Include comment)________________________    Risks: Potential for delay in receiving treatment      Consent for Transfer:  I acknowledge that my medical condition has been evaluated and explained to me by the emergency department physician or other qualified medical person and/or my attending physician, who has recommended that I be transferred to the service of  Accepting Physician: DR Kelly Macias  at [address] [place] [address] Name, [address] : [place]   The above potential benefits of such transfer, the potential risks associated with such transfer, and the probable risks of not being transferred have been explained to me, and I fully understand them  The doctor has explained that, in my case, the benefits of transfer outweigh the risks  I agree to be transferred  I authorize the performance of emergency medical procedures and treatments upon me in both transit and upon arrival at the receiving facility  Additionally, I authorize the release of any and all medical records to the receiving facility and request they be transported with me, if possible  I understand that the safest mode of transportation during a medical emergency is an ambulance and that the Hospital advocates the use of this mode of transport  Risks of traveling to the receiving facility by car, including absence of medical control, life sustaining equipment, such as oxygen, and medical personnel has been explained to me and I fully understand them      (MICHAEL CORRECT BOX BELOW)  [  ]  I consent to the stated transfer and to be transported by ambulance/helicopter  [  ]  I consent to the stated transfer, but refuse transportation by ambulance and accept full responsibility for my transportation by car  I understand the risks of non-ambulance transfers and I exonerate the Hospital and its staff from any deterioration in my condition that results from this refusal     X___________________________________________    DATE  20  TIME________  Signature of patient or legally responsible individual signing on patient behalf           RELATIONSHIP TO PATIENT_________________________          Provider Certification    NAME Marc Diggs                                         1965                              MRN [id number]    A medical screening exam was performed on the above named patient  Based on the examination:    Condition Necessitating Transfer The primary encounter diagnosis was Ventricular tachycardia ([address] )  Diagnoses of Chest pain, Dyspnea, Acute on chronic renal insufficiency, Elevated TSH, Elevated bilirubin, Pyelonephritis, Cholecystitis, Pericardial effusion, and Elevated troponin I level were also pertinent to this visit      Patient Condition: The patient has been stabilized such that within reasonable medical probability, no material deterioration of the patient condition or the condition of the unborn child(bradley) is likely to result from the transfer    Reason for Transfer: Level of Care needed not available at this facility    Transfer Requirements: Facility [employer] available and qualified personnel available for treatment as acknowledged by    · Agreed to accept transfer and to provide appropriate medical treatment as acknowledged by       DR Sarah Bermudez   · Appropriate medical records of the examination and treatment of the patient are provided at the time of transfer   [address], [address] _______  · Transfer will be performed by qualified personnel from and appropriate transfer equipment as required, including the use of necessary and appropriate life support measures  Provider Certification: I have examined the patient and explained the following risks and benefits of being transferred/refusing transfer to the patient/family:  General risk, such as traffic hazards, adverse weather conditions, rough terrain or turbulence, possible failure of equipment (including vehicle or aircraft), or consequences of actions of persons outside the control of the transport personnel      Based on these reasonable risks and benefits to the patient and/or the unborn child(bradley), and based upon the information available at the time of the patients examination, I certify that the medical benefits reasonably to be expected from the provision of appropriate medical treatments at another medical facility outweigh the increasing risks, if any, to the individuals medical condition, and in the case of labor to the unborn child, from effecting the transfer      X____________________________________________ DATE 08/13/20        TIME_______      ORIGINAL - SEND TO MEDICAL RECORDS   COPY - SEND WITH PATIENT DURING TRANSFER

## 2020-08-13 NOTE — ASSESSMENT & PLAN NOTE
Lab Results   Component Value Date    HGBA1C 6 2 (H) 06/26/2020       No results for input(s): POCGLU in the last 72 hours  Blood Sugar Average: Last 72 hrs: Well controled   Not on home medications

## 2020-08-13 NOTE — ED PROVIDER NOTES
History  Chief Complaint   Patient presents with    Shortness of Breath     shortness of breath started two weeks ago increasing today     49-year-old male presents complaining of chest pain shortness of breath which been intermittent but worse over the last day  As per EMS when they were transferring the patient he had a multiple beat run of V-tach  Patient was given 150 mg of amiodarone along with 4 baby Aspirin  Patient at this time denies chest pain but states he feels like he was heaviness in his chest"       History provided by:  Patient and EMS personnel  Shortness of Breath   Severity:  Moderate  Onset quality:  Gradual  Timing:  Intermittent  Progression:  Waxing and waning  Chronicity:  New  Context: activity    Relieved by:  Rest and sitting up  Worsened by:  Nothing  Ineffective treatments:  None tried  Associated symptoms: chest pain and diaphoresis    Associated symptoms: no abdominal pain    Risk factors: obesity    Risk factors: no recent alcohol use and no family hx of DVT        Prior to Admission Medications   Prescriptions Last Dose Informant Patient Reported? Taking?    cyclobenzaprine (FLEXERIL) 10 mg tablet  Self No No   Sig: Take 1 tablet (10 mg total) by mouth 2 (two) times a day as needed for muscle spasms   diclofenac (VOLTAREN) 75 mg EC tablet   No No   Sig: Take 1 tablet (75 mg total) by mouth 2 (two) times a day   irbesartan-hydrochlorothiazide (AVALIDE) 300-12 5 MG per tablet  Self Yes No   Sig: irbesartan 300 mg-hydrochlorothiazide 12 5 mg tablet   meloxicam (MOBIC) 15 mg tablet  Self Yes No   Sig: meloxicam 15 mg tablet   methocarbamol (ROBAXIN) 750 mg tablet  Self No No   Sig: Take 1 tablet (750 mg total) by mouth 3 (three) times a day as needed for muscle spasms   Patient not taking: Reported on 6/26/2020   pregabalin (LYRICA) 200 MG capsule  Self Yes No   Sig: Take 200 mg by mouth daily       Facility-Administered Medications: None       Past Medical History:   Diagnosis Date  Asthma     Back pain     Cardiomyopathy     Carpal tunnel syndrome     Chronic combined systolic and diastolic CHF (congestive heart failure)     COPD (chronic obstructive pulmonary disease)     GERD (gastroesophageal reflux disease)     Hypertension     Knee pain     Neck pain        Past Surgical History:   Procedure Laterality Date    CARPAL TUNNEL RELEASE      HAND SURGERY         Family History   Problem Relation Age of Onset    Cancer Mother     Hypertension Mother     Hypertension Father     Gout Father     Heart attack Father     Heart disease Father     Lung cancer Sister      I have reviewed and agree with the history as documented  E-Cigarette/Vaping    E-Cigarette Use Never User      E-Cigarette/Vaping Substances     Social History     Tobacco Use    Smoking status: Current Every Day Smoker     Packs/day: 0 50     Years: 36 00     Pack years: 18 00     Types: Cigarettes    Smokeless tobacco: Never Used   Substance Use Topics    Alcohol use: Yes     Comment: occasional    Drug use: Yes     Types: Marijuana     Comment: periodically/on ocassion       Review of Systems   Constitutional: Positive for diaphoresis  HENT: Negative  Eyes: Negative  Respiratory: Positive for shortness of breath  Cardiovascular: Positive for chest pain  Gastrointestinal: Negative for abdominal pain  Endocrine: Negative  Genitourinary: Negative  Musculoskeletal: Negative  Skin: Negative  Allergic/Immunologic: Negative  Neurological: Negative  Hematological: Negative  Psychiatric/Behavioral: Negative  All other systems reviewed and are negative  Physical Exam  Physical Exam  Vitals signs and nursing note reviewed  Constitutional:       General: He is not in acute distress  Appearance: He is not ill-appearing  HENT:      Head: Normocephalic        Mouth/Throat:      Mouth: Mucous membranes are moist    Eyes:      Pupils: Pupils are equal, round, and reactive to light  Neck:      Musculoskeletal: Normal range of motion  Thyroid: No thyromegaly  Cardiovascular:      Rate and Rhythm: Tachycardia present  Pulmonary:      Effort: Pulmonary effort is normal  No tachypnea, bradypnea, accessory muscle usage or respiratory distress  Abdominal:      Palpations: Abdomen is soft  Musculoskeletal:      Right lower leg: Edema present  Left lower leg: Edema present  Lymphadenopathy:      Cervical: No cervical adenopathy  Skin:     General: Skin is warm  Capillary Refill: Capillary refill takes less than 2 seconds  Coloration: Skin is not cyanotic or pale  Neurological:      General: No focal deficit present  Mental Status: He is alert     Psychiatric:         Mood and Affect: Mood normal          Vital Signs  ED Triage Vitals [08/12/20 2247]   Temperature Pulse Respirations Blood Pressure SpO2   98 4 °F (36 9 °C) (!) 110 20 151/82 99 %      Temp Source Heart Rate Source Patient Position - Orthostatic VS BP Location FiO2 (%)   Temporal Monitor Sitting Left arm --      Pain Score       No Pain           Vitals:    08/12/20 2247 08/13/20 0300   BP: 151/82 (!) 142/106   Pulse: (!) 110 101   Patient Position - Orthostatic VS: Sitting Sitting         Visual Acuity      ED Medications  Medications   amiodarone (FOR EMS ONLY) 150 mg/3 mL injection 150 mg (has no administration in time range)   amiodarone (CORDARONE) 900 mg in dextrose 5 % 500 mL infusion (1 mg/min Intravenous New Bag 8/13/20 0005)   ciprofloxacin (CIPRO) IVPB (premix) 400 mg 200 mL (400 mg Intravenous New Bag 8/13/20 0337)   magnesium sulfate IVPB (premix) SOLN 1 g (0 g Intravenous Stopped 8/13/20 0135)   iohexol (OMNIPAQUE) 350 MG/ML injection (MULTI-DOSE) 100 mL (100 mL Intravenous Given 8/12/20 2350)   metroNIDAZOLE (FLAGYL) IVPB (premix) 500 mg 100 mL (0 mg Intravenous Stopped 8/13/20 0228)       Diagnostic Studies  Results Reviewed     Procedure Component Value Units Date/Time    Troponin I [[id number]] Collected:  08/13/20 0358    Lab Status: In process Specimen:  Blood from Arm, Left Updated:  08/13/20 0402    Rapid drug screen, urine [[id number]]  (Normal) Collected:  08/13/20 0147    Lab Status:  Final result Specimen:  Urine, Clean Catch Updated:  08/13/20 0213     Amph/Meth UR Negative     Barbiturate Ur Negative     Benzodiazepine Urine Negative     Cocaine Urine Negative     Methadone Urine Negative     Opiate Urine Negative     PCP Ur Negative     THC Urine Negative     Oxycodone Urine Negative    Narrative:       FOR MEDICAL PURPOSES ONLY  IF CONFIRMATION NEEDED PLEASE CONTACT THE LAB WITHIN 5 DAYS  Drug Screen Cutoff Levels:  AMPHETAMINE/METHAMPHETAMINES  1000 ng/mL  BARBITURATES     200 ng/mL  BENZODIAZEPINES     200 ng/mL  COCAINE      300 ng/mL  METHADONE      300 ng/mL  OPIATES      300 ng/mL  PHENCYCLIDINE     25 ng/mL  THC       50 ng/mL  OXYCODONE      100 ng/mL    CBC and differential [[id number]]  (Abnormal) Collected:  08/12/20 2251    Lab Status:  Final result Specimen:  Blood from Arm, Right Updated:  08/12/20 2326     WBC 12 16 Thousand/uL      RBC 5 17 Million/uL      Hemoglobin 14 9 g/dL      Hematocrit 46 9 %      MCV 91 fL      MCH 28 8 pg      MCHC 31 8 g/dL      RDW 15 0 %      MPV 9 6 fL      Platelets 427 Thousands/uL      nRBC 0 /100 WBCs     Narrative: This is an appended report  These results have been appended to a previously verified report      D-Dimer [[id number]]  (Abnormal) Collected:  08/12/20 2251    Lab Status:  Final result Specimen:  Blood from Arm, Right Updated:  08/12/20 2325     D-Dimer, Quant 1 53 ug/ml FEU     TSH [[id number]]  (Abnormal) Collected:  08/12/20 2251    Lab Status:  Final result Specimen:  Blood from Arm, Right Updated:  08/12/20 2324     TSH 3RD GENERATON 7 884 uIU/mL     Narrative:       Patients undergoing fluorescein dye angiography may retain small amounts of fluorescein in the body for 48-72 hours post procedure  Samples containing fluorescein can produce falsely depressed TSH values  If the patient had this procedure,a specimen should be resubmitted post fluorescein clearance  Magnesium [[id number]]  (Normal) Collected:  08/12/20 2251    Lab Status:  Final result Specimen:  Blood from Arm, Right Updated:  08/12/20 2324     Magnesium 1 7 mg/dL     Troponin I [[id number]]  (Abnormal) Collected:  08/12/20 2251    Lab Status:  Final result Specimen:  Blood from Arm, Right Updated:  08/12/20 2319     Troponin I 0 07 ng/mL     Comprehensive metabolic panel [[id number]]  (Abnormal) Collected:  08/12/20 2251    Lab Status:  Final result Specimen:  Blood from Arm, Right Updated:  08/12/20 2317     Sodium 136 mmol/L      Potassium 3 9 mmol/L      Chloride 104 mmol/L      CO2 22 mmol/L      ANION GAP 10 mmol/L      BUN 21 mg/dL      Creatinine 1 55 mg/dL      Glucose 101 mg/dL      Calcium 8 3 mg/dL      AST 40 U/L      ALT 46 U/L      Alkaline Phosphatase 72 U/L      Total Protein 7 4 g/dL      Albumin 3 1 g/dL      Total Bilirubin 2 30 mg/dL      eGFR 57 ml/min/1 73sq m     Narrative:       [place] guidelines for Chronic Kidney Disease (CKD):     Stage 1 with normal or high GFR (GFR > 90 mL/min/1 73 square meters)    Stage 2 Mild CKD (GFR = 60-89 mL/min/1 73 square meters)    Stage 3A Moderate CKD (GFR = 45-59 mL/min/1 73 square meters)    Stage 3B Moderate CKD (GFR = 30-44 mL/min/1 73 square meters)    Stage 4 Severe CKD (GFR = 15-29 mL/min/1 73 square meters)    Stage 5 End Stage CKD (GFR <15 mL/min/1 73 square meters)  Note: GFR calculation is accurate only with a steady state creatinine                 PE Study with CT Abdomen and Pelvis with contrast   Final Result by Aiden Yun MD (08/13 0102)      Extensive bilateral striated nephrograms concerning for pyelonephritis versus atypical infiltrating process  Recommend follow-up to resolution        Mild to moderate pericardial effusion  Gallbladder wall is thickened and enhancing concerning for cholecystitis  Workstation performed: [id number]                    Procedures  ECG 12 Lead Documentation Only    Date/Time: 8/12/2020 10:52 PM  Performed by: Keron Stahl DO  Authorized by: Keron Stahl DO     Indications / Diagnosis:  Dyspnea   ECG reviewed by me, the ED Provider: yes    Patient location:  ED  Previous ECG:     Previous ECG:  Unavailable  Interpretation:     Interpretation: non-specific    Rate:     ECG rate:  109    ECG rate assessment: tachycardic    Rhythm:     Rhythm: sinus tachycardia    ST segments:     ST segments:  Non-specific    ECG 12 Lead Documentation Only    Date/Time: 8/13/2020 4:14 AM  Performed by: Keron Stahl DO  Authorized by: Keron Stahl DO     Indications / Diagnosis:  Chest tightness   ECG reviewed by me, the ED Provider: yes    Patient location:  ED  Interpretation:     Interpretation: non-specific    Rate:     ECG rate:  100    ECG rate assessment: tachycardic    Rhythm:     Rhythm: sinus rhythm    ST segments:     ST segments:  Non-specific             ED Course       US AUDIT      Most Recent Value   Initial Alcohol Screen: US AUDIT-C    1  How often do you have a drink containing alcohol?  0 Filed at: 08/12/2020 2248   2  How many drinks containing alcohol do you have on a typical day you are drinking? 0 Filed at: 08/12/2020 2248   3a  Male UNDER 65: How often do you have five or more drinks on one occasion? 0 Filed at: 08/12/2020 2248   3b  FEMALE Any Age, or MALE 65+: How often do you have 4 or more drinks on one occassion? 0 Filed at: 08/12/2020 2248   Audit-C Score  0 Filed at: 08/12/2020 2248                  LORENOZ/DAST-10      Most Recent Value   How many times in the past year have you    Used an illegal drug or used a prescription medication for non-medical reasons?   Never Filed at: 08/12/2020 2248                                MDM  Number of Diagnoses or Management Options  Acute on chronic renal insufficiency:   Chest pain:   Cholecystitis:   Dyspnea:   Elevated bilirubin:   Elevated troponin I level:   Elevated TSH:   Pericardial effusion:   Pyelonephritis:   Ventricular tachycardia [facility]):   Diagnosis management comments: Differential diagnosis 1  Acute coronary syndrome 2  Electrolyte abnormality 3  Arrhythmia  Will check labs  At this point patient has had 150 mg of amiodarone  And aspirin     6/26/2020  Patient's recent echo demonstrates: LEFT VENTRICLE:  Systolic function was severely reduced by visual assessment  Ejection fraction was estimated in the range of 20 % to 25 %  There appeared to be severe diffuse hypokinesis  This study was inadequate for a more detailed evaluation of regional wall motion  Wall thickness was mildly increased  There was mild concentric hypertrophy  Features were consistent with a pseudonormal left ventricular filling pattern, with concomitant abnormal relaxation and increased filling pressure (grade 2 diastolic dysfunction)  "    23:25   I spoke with Dr Sneha Stevens of Cardiology to discuss plan of care -    96  Transfer center states that the patient cannot be transferred secondary to the amount of transfers  0119  Patient has no abdominal or flank pain   I did tell transfer center of the findings of cholecystitis and pyelonephritis  Patient has neither symptoms at this time  01:52  I spoke with Resident Dr Mani Diaz who admitted for Dr James Choi  Patient is now willing to undergo cardiac catheterization    02:30  I was asked speak to Cardiology again in regard to the patient's plan of care  PAC paged Dr Sneha Stevens again (no answer after multiple attempts both by myself and by Marta Mccoy in the [facility])   Patient does states that he would be willing to undergo cardiac catheterization  At this time based on minimally elevated troponin and the possibility of cholecystitis and pyelonephritis I would not anticoagulate the patient  Did speak to Medicine team about this fact      0345  Accu check 116 mg/dl  MIP #2 drawn and sent to lab          Amount and/or Complexity of Data Reviewed  Clinical lab tests: reviewed and ordered  Tests in the radiology section of CPT®: ordered and reviewed  Tests in the medicine section of CPT®: ordered and reviewed    Risk of Complications, Morbidity, and/or Mortality  Presenting problems: high  Diagnostic procedures: high  Management options: high          Disposition  Final diagnoses:   Ventricular tachycardia ([address] )   Chest pain   Dyspnea   Acute on chronic renal insufficiency   Elevated TSH   Elevated bilirubin   Pyelonephritis   Cholecystitis   Pericardial effusion   Elevated troponin I level     Time reflects when diagnosis was documented in both MDM as applicable and the Disposition within this note     Time User Action Codes Description Comment    8/12/2020 10:54 PM Susan Sitter Add [I47 2] Ventricular tachycardia ([address] )     8/12/2020 10:54 PM Susan Sitter Add [R07 9] Chest pain     8/12/2020 10:55 PM Susan Sitter Add [R06 00] Dyspnea     8/12/2020 11:23 PM Susan Sitter Add [N28 9,  N18 9] Acute on chronic renal insufficiency     8/12/2020 11:27 PM Susan Sitter Add [R79 89] Elevated TSH     8/12/2020 11:53 PM Susan Sitter Add [R17] Elevated bilirubin     8/13/2020  1:08 AM Susan Sitter Add [N12] Pyelonephritis     8/13/2020  1:08 AM Susan Sitter Add [K81 9] Cholecystitis     8/13/2020  1:08 AM Susan Sitter Add [I31 3] Pericardial effusion     8/13/2020  1:53 AM Susan Sitter Add [R79 89] Elevated troponin I level       ED Disposition     ED Disposition Condition Date/Time Comment    Transfer to Another Facility-In Network  Wed Aug 12, 2020 11:50 PM         MD Documentation      Most Recent Value   Patient Condition  The patient has been stabilized such that within reasonable medical probability, no material deterioration of the patient condition or the condition of the unborn child(bradley) is likely to result from the transfer   Reason for Transfer  Level of Care needed not available at this facility   Benefits of Transfer  Specialized equipment and/or services available at the receiving facility (Include comment)________________________   Risks of Transfer  Potential for delay in receiving treatment   Accepting Physician  DR [address] Name, [facility]    Provider Certification  General risk, such as traffic hazards, adverse weather conditions, rough terrain or turbulence, possible failure of equipment (including vehicle or aircraft), or consequences of actions of persons outside the control of the transport personnel      RN Documentation      Most [address] [facility] Name, [facility]    Medications Reviewed with Next Provider of Service  Yes   Transport Mode  Ambulance   Level of Care  Advanced life support      Follow-up Information    None         Patient's Medications   Discharge Prescriptions    No medications on file     No discharge procedures on file      PDMP Review     None          ED Provider  Electronically Signed by           Kristi Gaona,   08/13/20 [place], DO  08/13/20 [facility], DO  08[facility]/20 [facility]  08/13/20 7825

## 2020-08-14 ENCOUNTER — APPOINTMENT (INPATIENT)
Dept: NON INVASIVE DIAGNOSTICS | Facility: HOSPITAL | Age: 55
DRG: 291 | End: 2020-08-14
Payer: MEDICARE

## 2020-08-14 ENCOUNTER — APPOINTMENT (INPATIENT)
Dept: RADIOLOGY | Facility: HOSPITAL | Age: 55
DRG: 291 | End: 2020-08-14
Payer: MEDICARE

## 2020-08-14 PROBLEM — K81.0 ACUTE CHOLECYSTITIS: Status: ACTIVE | Noted: 2020-08-14

## 2020-08-14 PROBLEM — I21.A1 TYPE 2 MI (MYOCARDIAL INFARCTION) (HCC): Status: ACTIVE | Noted: 2020-08-13

## 2020-08-14 PROBLEM — R74.01 TRANSAMINITIS: Status: ACTIVE | Noted: 2020-08-14

## 2020-08-14 LAB
ALBUMIN SERPL BCP-MCNC: 3.1 G/DL (ref 3.5–5)
ALP SERPL-CCNC: 94 U/L (ref 46–116)
ALT SERPL W P-5'-P-CCNC: 1278 U/L (ref 12–78)
ANION GAP SERPL CALCULATED.3IONS-SCNC: 10 MMOL/L (ref 4–13)
AST SERPL W P-5'-P-CCNC: 2069 U/L (ref 5–45)
BACTERIA UR QL AUTO: ABNORMAL /HPF
BASOPHILS # BLD AUTO: 0.05 THOUSANDS/ΜL (ref 0–0.1)
BASOPHILS # BLD AUTO: 0.06 THOUSANDS/ΜL (ref 0–0.1)
BASOPHILS NFR BLD AUTO: 0 % (ref 0–1)
BASOPHILS NFR BLD AUTO: 0 % (ref 0–1)
BILIRUB DIRECT SERPL-MCNC: 0.77 MG/DL (ref 0–0.2)
BILIRUB SERPL-MCNC: 1.99 MG/DL (ref 0.2–1)
BILIRUB UR QL STRIP: NEGATIVE
BUN SERPL-MCNC: 27 MG/DL (ref 5–25)
CALCIUM SERPL-MCNC: 8.2 MG/DL (ref 8.3–10.1)
CHLORIDE SERPL-SCNC: 103 MMOL/L (ref 100–108)
CLARITY UR: CLEAR
CO2 SERPL-SCNC: 24 MMOL/L (ref 21–32)
COLOR UR: YELLOW
CREAT SERPL-MCNC: 1.31 MG/DL (ref 0.6–1.3)
EOSINOPHIL # BLD AUTO: 0.13 THOUSAND/ΜL (ref 0–0.61)
EOSINOPHIL # BLD AUTO: 0.14 THOUSAND/ΜL (ref 0–0.61)
EOSINOPHIL NFR BLD AUTO: 1 % (ref 0–6)
EOSINOPHIL NFR BLD AUTO: 1 % (ref 0–6)
ERYTHROCYTE [DISTWIDTH] IN BLOOD BY AUTOMATED COUNT: 15.3 % (ref 11.6–15.1)
ERYTHROCYTE [DISTWIDTH] IN BLOOD BY AUTOMATED COUNT: 15.7 % (ref 11.6–15.1)
GFR SERPL CREATININE-BSD FRML MDRD: 70 ML/MIN/1.73SQ M
GLUCOSE SERPL-MCNC: 137 MG/DL (ref 65–140)
GLUCOSE SERPL-MCNC: 156 MG/DL (ref 65–140)
GLUCOSE SERPL-MCNC: 79 MG/DL (ref 65–140)
GLUCOSE SERPL-MCNC: 87 MG/DL (ref 65–140)
GLUCOSE SERPL-MCNC: 96 MG/DL (ref 65–140)
GLUCOSE UR STRIP-MCNC: NEGATIVE MG/DL
HAV IGM SER QL: NORMAL
HBV CORE IGM SER QL: NORMAL
HBV SURFACE AG SER QL: NORMAL
HCT VFR BLD AUTO: 46.2 % (ref 36.5–49.3)
HCT VFR BLD AUTO: 47.4 % (ref 36.5–49.3)
HCV AB SER QL: NORMAL
HGB BLD-MCNC: 14.9 G/DL (ref 12–17)
HGB BLD-MCNC: 15 G/DL (ref 12–17)
HGB UR QL STRIP.AUTO: NEGATIVE
HYALINE CASTS #/AREA URNS LPF: ABNORMAL /LPF
IMM GRANULOCYTES # BLD AUTO: 0.05 THOUSAND/UL (ref 0–0.2)
IMM GRANULOCYTES # BLD AUTO: 0.07 THOUSAND/UL (ref 0–0.2)
IMM GRANULOCYTES NFR BLD AUTO: 0 % (ref 0–2)
IMM GRANULOCYTES NFR BLD AUTO: 1 % (ref 0–2)
KETONES UR STRIP-MCNC: NEGATIVE MG/DL
LACTATE SERPL-SCNC: 1.7 MMOL/L (ref 0.5–2)
LEUKOCYTE ESTERASE UR QL STRIP: NEGATIVE
LYMPHOCYTES # BLD AUTO: 3.02 THOUSANDS/ΜL (ref 0.6–4.47)
LYMPHOCYTES # BLD AUTO: 3.99 THOUSANDS/ΜL (ref 0.6–4.47)
LYMPHOCYTES NFR BLD AUTO: 26 % (ref 14–44)
LYMPHOCYTES NFR BLD AUTO: 27 % (ref 14–44)
MCH RBC QN AUTO: 28.1 PG (ref 26.8–34.3)
MCH RBC QN AUTO: 28.7 PG (ref 26.8–34.3)
MCHC RBC AUTO-ENTMCNC: 31.4 G/DL (ref 31.4–37.4)
MCHC RBC AUTO-ENTMCNC: 32.5 G/DL (ref 31.4–37.4)
MCV RBC AUTO: 89 FL (ref 82–98)
MCV RBC AUTO: 89 FL (ref 82–98)
MONOCYTES # BLD AUTO: 1.12 THOUSAND/ΜL (ref 0.17–1.22)
MONOCYTES # BLD AUTO: 1.63 THOUSAND/ΜL (ref 0.17–1.22)
MONOCYTES NFR BLD AUTO: 10 % (ref 4–12)
MONOCYTES NFR BLD AUTO: 11 % (ref 4–12)
NEUTROPHILS # BLD AUTO: 7.4 THOUSANDS/ΜL (ref 1.85–7.62)
NEUTROPHILS # BLD AUTO: 8.73 THOUSANDS/ΜL (ref 1.85–7.62)
NEUTS SEG NFR BLD AUTO: 60 % (ref 43–75)
NEUTS SEG NFR BLD AUTO: 63 % (ref 43–75)
NITRITE UR QL STRIP: NEGATIVE
NON-SQ EPI CELLS URNS QL MICRO: ABNORMAL /HPF
NRBC BLD AUTO-RTO: 0 /100 WBCS
NRBC BLD AUTO-RTO: 0 /100 WBCS
NT-PROBNP SERPL-MCNC: 1759 PG/ML
PH UR STRIP.AUTO: 6 [PH]
PLATELET # BLD AUTO: 283 THOUSANDS/UL (ref 149–390)
PLATELET # BLD AUTO: 284 THOUSANDS/UL (ref 149–390)
PMV BLD AUTO: 10 FL (ref 8.9–12.7)
PMV BLD AUTO: 10.1 FL (ref 8.9–12.7)
POTASSIUM SERPL-SCNC: 3.9 MMOL/L (ref 3.5–5.3)
PROT SERPL-MCNC: 7.1 G/DL (ref 6.4–8.2)
PROT UR STRIP-MCNC: ABNORMAL MG/DL
RBC # BLD AUTO: 5.22 MILLION/UL (ref 3.88–5.62)
RBC # BLD AUTO: 5.3 MILLION/UL (ref 3.88–5.62)
RBC #/AREA URNS AUTO: ABNORMAL /HPF
SODIUM SERPL-SCNC: 137 MMOL/L (ref 136–145)
SP GR UR STRIP.AUTO: 1.02 (ref 1–1.03)
T4 FREE SERPL-MCNC: 1.29 NG/DL (ref 0.76–1.46)
UROBILINOGEN UR QL STRIP.AUTO: 2 E.U./DL
WBC # BLD AUTO: 11.77 THOUSAND/UL (ref 4.31–10.16)
WBC # BLD AUTO: 14.62 THOUSAND/UL (ref 4.31–10.16)
WBC #/AREA URNS AUTO: ABNORMAL /HPF

## 2020-08-14 PROCEDURE — 80074 ACUTE HEPATITIS PANEL: CPT | Performed by: INTERNAL MEDICINE

## 2020-08-14 PROCEDURE — 85025 COMPLETE CBC W/AUTO DIFF WBC: CPT | Performed by: INTERNAL MEDICINE

## 2020-08-14 PROCEDURE — 80076 HEPATIC FUNCTION PANEL: CPT | Performed by: INTERNAL MEDICINE

## 2020-08-14 PROCEDURE — 84439 ASSAY OF FREE THYROXINE: CPT | Performed by: INTERNAL MEDICINE

## 2020-08-14 PROCEDURE — 99232 SBSQ HOSP IP/OBS MODERATE 35: CPT | Performed by: INTERNAL MEDICINE

## 2020-08-14 PROCEDURE — C8929 TTE W OR WO FOL WCON,DOPPLER: HCPCS

## 2020-08-14 PROCEDURE — 80048 BASIC METABOLIC PNL TOTAL CA: CPT | Performed by: INTERNAL MEDICINE

## 2020-08-14 PROCEDURE — 81001 URINALYSIS AUTO W/SCOPE: CPT | Performed by: STUDENT IN AN ORGANIZED HEALTH CARE EDUCATION/TRAINING PROGRAM

## 2020-08-14 PROCEDURE — 82948 REAGENT STRIP/BLOOD GLUCOSE: CPT

## 2020-08-14 PROCEDURE — 83880 ASSAY OF NATRIURETIC PEPTIDE: CPT | Performed by: INTERNAL MEDICINE

## 2020-08-14 RX ADMIN — ASPIRIN 81 MG: 81 TABLET, COATED ORAL at 08:03

## 2020-08-14 RX ADMIN — ENOXAPARIN SODIUM 60 MG: 80 INJECTION SUBCUTANEOUS at 22:11

## 2020-08-14 RX ADMIN — CEFTRIAXONE SODIUM 2000 MG: 2 INJECTION, POWDER, FOR SOLUTION INTRAMUSCULAR; INTRAVENOUS at 14:56

## 2020-08-14 RX ADMIN — PERFLUTREN 0.4 ML/MIN: 6.52 INJECTION, SUSPENSION INTRAVENOUS at 18:32

## 2020-08-14 RX ADMIN — METRONIDAZOLE 500 MG: 500 INJECTION, SOLUTION INTRAVENOUS at 22:11

## 2020-08-14 RX ADMIN — ACETAMINOPHEN 650 MG: 325 TABLET, FILM COATED ORAL at 08:15

## 2020-08-14 RX ADMIN — METRONIDAZOLE 500 MG: 500 INJECTION, SOLUTION INTRAVENOUS at 05:56

## 2020-08-14 RX ADMIN — METRONIDAZOLE 500 MG: 500 INJECTION, SOLUTION INTRAVENOUS at 14:55

## 2020-08-14 RX ADMIN — CEFTRIAXONE SODIUM 2000 MG: 2 INJECTION, POWDER, FOR SOLUTION INTRAMUSCULAR; INTRAVENOUS at 01:40

## 2020-08-14 RX ADMIN — ENOXAPARIN SODIUM 60 MG: 80 INJECTION SUBCUTANEOUS at 08:03

## 2020-08-14 NOTE — QUICK NOTE
Lactic acid came back at 9:30pm  It was 3 2, increased from 2 6  Repeat lactic acid 2 hours later was down to 1 7  Patient stable  Also called about 6 beat vtach around 9:30pm  Patient was asymptomatic and vitals were stable  Resolved spontaneously

## 2020-08-14 NOTE — PROGRESS NOTES
INTERNAL MEDICINE RESIDENCY PROGRESS NOTE     Name: Orly Bright   Age & Sex: 62 y o  male   MRN: [id number]  Unit/Bed#: [facility] 529-01   Encounter: [id number]  Team: SOD Team B     PATIENT INFORMATION     Name: Orly Bright   Age & Sex: 62 y o  male   MRN: [id number]  Hospital Stay Days: 1    ASSESSMENT/PLAN     Principal Problem:    Acute respiratory failure with hypoxia [facility])  Active Problems:    Acute cholecystitis    Transaminitis    Acute combined systolic and diastolic congestive heart failure (HCC)    Sustained VT (ventricular tachycardia) (HCC)    Type 2 MI (myocardial infarction) ([facility] )    Spondylolisthesis at L4-L5 level    Foraminal stenosis of lumbar region    Chronic obstructive lung disease (HCC)    Chronic pain    GERD (gastroesophageal reflux disease)    Morbid obesity ([place] [id number] )    Essential hypertension    Tobacco use disorder    CKD (chronic kidney disease) stage 2, GFR 60-89 ml/min    Hypothyroidism (acquired)    Pre-diabetes    Hyperlipidemia    Vitamin D deficiency    Pericardial effusion      Acute cholecystitis  Assessment & Plan  · Findings of acute cholecystitis seen on CT abdomen   · Will empirically treat with ceftriaxone and flagyl as patient met SIRS Criteria  · Will rule out definitively will HIDA scan   · Continue to monitor     Transaminitis  Assessment & Plan  · Patient is being evaluated for abnormal liver chemistry  · The patient has acute Severe elevation of liver enzymes (>15x ULN), normal enzymes 2 days ago   · Jaundice present: no  · R- ratio suggests- Hepatocellular  · Signs of acute liver failure?- no  · Likely etiology includes: ischemic hepatitis given episode of hypotension yesterday after diuresis  · Will rule out acute viral hepatitis  · Discontinue hepatotoxic medications  · Avoid alcohol consumption   · Continue to trend enzymes     * Acute respiratory failure with hypoxia ([facility] )  Assessment & Plan  · Patient requiring 6LNC on presentation  · Likely etiology includes acute decompensated heart failure vs sepsis  · ABG was showing metabolic acidosis with respiratory compensation   · Patients source of sepsis would likely be cholecystitis  · Currently on empiric abx with ceftriaxone and flagyl  · Await blood cultures to return   · Currently seems to be doing much better from a breathing standpoint     Acute combined systolic and diastolic congestive heart failure (HCC)  Assessment & Plan  Wt Readings from Last 3 Encounters:   08/12/20 (!) 154 kg (339 lb 1 1 oz)   06/29/20 (!) 143 kg (314 lb 2 5 oz)   10/28/19 (!) 153 kg (337 lb)     · Echo 06/26/2020-- EF of 20 25% and Grade 2 DD  · Mild to moderate pericardial effusion on CTA chest  · Received a total of 120mg iv lasix on 08/13  · Currently off lasix   · Cardiology is following, will await recommendations for futher diuresis     Sustained VT (ventricular tachycardia) ([facility] )  Assessment & Plan  · Patient had 32 seconds run of V-tach per EMS EN route to [facility]  · Likely secondary to ischemic cardiomyopathy  · Recent echo 06/26/2020 showing an EF of 20-25% with diffuse hypokinesis  · Started on amiodarone at [facility], currently off drip  · Monitor electrolytes  · Cardiology following  · Monitor tele      Type 2 MI (myocardial infarction) ([facility] )  Assessment & Plan  · Troponin peaked at 0 07 and then downtrended  · No ischemic changes on EKG and patient had recent cardiac cath   · Likely in the setting of Acute decompensated heart failure     Vitamin D deficiency  Assessment & Plan  · Vitamin-D level around 13  · Recommended vitamin-D supplementation/ dietary changes/sun exposure  · Follow-up outpatient    Hyperlipidemia  Assessment & Plan  · Patient not taking any medication  · started high-intensity statin    Pre-diabetes  Assessment & Plan  · Hemoglobin A1c 6 2  · Educated on weight loss/dietary modification  · Not currently on any medication    Hypothyroidism (acquired)  Assessment & Plan  · Patient with elevated TSH >7  · Follow-up fT4  · Patient not taking any medication  · Follow-up outpatient PCP    CKD (chronic kidney disease) stage 2, GFR 60-89 ml/min  Assessment & Plan  · Creatinine 1 55 on presentation currently improved and back to baseline around 1 3-1 4  · Monitor BMP    Tobacco use disorder  Assessment & Plan  · Smoking cessation advised  · States he quit last month      Essential hypertension  Assessment & Plan  · Patient on home ARB-HCTZ 300-12 5 mg daily  · Will continue  · Monitor BMP, BP    Morbid obesity (HCC)  Assessment & Plan  · Advise Weight loss /bariatric surgery  · Follow-up palpation  · Educated on lifestyle modification      GERD (gastroesophageal reflux disease)  Assessment & Plan  · Not on home medications  · Follow up out pt pcp  · Avoid NSAIDs and triggering foods  · Weight loss advised      Disposition: Await HIDA scan and continue inpatient care    SUBJECTIVE     Patient seen and examined  No acute events overnight  Patient says his breathing is better but he is having difficulty sleeping  Patient denies chest pain, palpitations, SOB, cough, abdominal pain, nausea, vomiting, constipation, diarrhea, fevers/chills, headaches, dysuria  OBJECTIVE     Vitals:    20 0400 20 0546 20 0748 20 1104   BP:   105/61 155/79   BP Location:   Right arm Right arm   Pulse:   97 98   Resp:   19 20   Temp:   97 5 °F (36 4 °C) 98 °F (36 7 °C)   TempSrc:   Oral Oral   SpO2: 96%  99% 97%   Weight:  (!) 146 kg (322 lb 15 6 oz)     Height:          Temperature:   Temp (24hrs), Av 1 °F (36 7 °C), Min:97 5 °F (36 4 °C), Max:98 7 °F (37 1 °C)    Temperature: 98 °F (36 7 °C)  Intake & Output:  I/O       701 -  0700 701 -  0700 701 - 08/15 0700    P  O   998 240    IV Piggyback  150     Total Intake(mL/kg)  1148 (7 9) 240 (1 6)    Urine (mL/kg/hr)  5535 (1 6) 400 (0 6)    Total Output  5535 400    Net  -2705 -235               Weights:   IBW: 71 85 kg    Body mass index is 47 01 kg/m²  Weight (last 2 days)     Date/Time   Weight    08/14/20 0546   (!) 146 (256 97)            Physical Exam:   GENERAL: NAD, morbidly obese male   HEENT:  NC/AT, MMM, no scleral icterus  CARDIAC:  RRR, +S1/S2, no S3/S4 heard, no m/g/r  PULMONARY:  CTA B/L, no wheezing/rales/rhonci, non-labored breathing  ABDOMEN:  Soft, NT/ND, +BS, no rebound/guarding/rigidity  Extremities:  2+ Pulses in DP/PT  No edema, cyanosis, or clubbing  NEUROLOGIC:  Alert/oriented x3  SKIN:  No rashes or erythema    LABORATORY DATA     Labs: I have personally reviewed pertinent reports  Results from last 7 days   Lab Units 08/14/20  0541 08/13/20  2349 08/13/20  0953  08/12/20  2251   WBC Thousand/uL 11 77* 14 62* 21 30*  --  12 16*   HEMOGLOBIN g/dL 14 9 15 0 16 5  --  14 9   I STAT HEMOGLOBIN   --   --   --    < >  --    HEMATOCRIT % 47 4 46 2 52 7*  --  46 9   HEMATOCRIT, ISTAT   --   --   --    < >  --    PLATELETS Thousands/uL 284 283 310  --  287   NEUTROS PCT % 63 60  --   --   --    MONOS PCT % 10 11  --   --   --    MONO PCT %  --   --   --   --  10    < > = values in this interval not displayed        Results from last 7 days   Lab Units 08/14/20  0541 08/13/20  1720 08/13/20  0953 08/13/20  0939 08/12/20  2251   POTASSIUM mmol/L 3 9 4 0 5 9*  --  3 9   CHLORIDE mmol/L 103 103 106  --  104   CO2 mmol/L 24 22 16*  --  22   CO2, I-STAT mmol/L  --   --   --  16*  --    BUN mg/dL 27* 25 23  --  21   CREATININE mg/dL 1 31* 1 64* 1 73*  --  1 55*   CALCIUM mg/dL 8 2* 8 7 8 4  --  8 3   ALK PHOS U/L 94  --   --   --  72   ALT U/L 1,278*  --   --   --  46   AST U/L 2,069*  --   --   --  40   GLUCOSE, ISTAT mg/dl  --   --   --  80  --      Results from last 7 days   Lab Units 08/13/20  0953 08/12/20  2251   MAGNESIUM mg/dL 2 2 1 7     Results from last 7 days   Lab Units 08/13/20  0953   PHOSPHORUS mg/dL 4 9*          Results from last 7 days   Lab Units 08/13/20  2349   LACTIC ACID mmol/L 1 7     Results from last 7 days   Lab Units 08/13/20  0953 08/13/20  0358 08/12/20  2251   TROPONIN I ng/mL 0 10* 0 04 0 07*       IMAGING & DIAGNOSTIC TESTING     Radiology Results: I have personally reviewed pertinent reports  Xr Chest Portable    Result Date: 8/13/2020  Impression: No acute cardiopulmonary disease  Workstation performed: [id number]     Pe Study With Ct Abdomen And Pelvis With Contrast    Result Date: 8/13/2020  Impression: Extensive bilateral striated nephrograms concerning for pyelonephritis versus atypical infiltrating process  Recommend follow-up to resolution  Mild to moderate pericardial effusion  Gallbladder wall is thickened and enhancing concerning for cholecystitis  Workstation performed: [id number]     Other Diagnostic Testing: I have personally reviewed pertinent reports  ACTIVE MEDICATIONS     Current Facility-Administered Medications   Medication Dose Route Frequency    acetaminophen (TYLENOL) tablet 650 mg  650 mg Oral Q6H PRN    aspirin (ECOTRIN LOW STRENGTH) EC tablet 81 mg  81 mg Oral Daily    cefTRIAXone (ROCEPHIN) 2,000 mg in dextrose 5 % 50 mL IVPB  2,000 mg Intravenous Q12H    enoxaparin (LOVENOX) subcutaneous injection 60 mg  60 mg Subcutaneous BID    metroNIDAZOLE (FLAGYL) IVPB (premix) 500 mg 100 mL  500 mg Intravenous Q8H    trimethobenzamide (TIGAN) IM injection 200 mg  200 mg Intramuscular Q6H PRN       VTE Pharmacologic Prophylaxis: Enoxaparin (Lovenox)  VTE Mechanical Prophylaxis: sequential compression device    Portions of the record may have been created with voice recognition software  Occasional wrong word or "sound a like" substitutions may have occurred due to the inherent limitations of voice recognition software    Read the chart carefully and recognize, using context, where substitutions have occurred   ==  Rosas Castillo MD  [facility]  Internal Medicine Residency PGY-3

## 2020-08-14 NOTE — PROGRESS NOTES
Progress Note - Cardiology   Karolina Mckeon 62 y o  male MRN: [id number]  Unit/Bed#: [facility] 529-01 Encounter: [id number]    Assessment / Plan       62 y o  male with PMHx of CAD s/p PCI 2008, HFrEF (<20% EF 7/20), EUNICE?, HTN, HLD, T2DM, GERD, CKD II, Tobacco use, recent multiple hospitalization for CHF exacerbation, who is a transfer from [facility] for cardiac evaluation        1) Acute Decompensated Heart Failure / Possible Sepsis          08/14/20          146 kg (322Lbs)   08/12/20 (!) 154 kg (339 lb 1 1 oz)   06/29/20 (!) 143 kg (314 lb 2 5 oz)   10/28/19 (!) 153 kg (337 lb)      -Multiple recent hospitalization for HF exacerbation  -Recent echo 06/26/2020 showing an EF of 20-25% with diffuse hypokinesis  -Respiratory distress w/ increase work of breathing, O2 sat 96% on 5 L NC, w/ b/l crackles in lung field and JVD  -Rapid was called to evaluate the need for BiPAP and increase the level of care   -8/13 WBC of 20K, Abnormal ABG  PH 7 398, PCO2 24 5, HCO3 15 1  -[place] Negative  -8/14- Afebrile, O2 sat >95% on RA, BP /, No respiratory distress   -WBC 20K--> 11K, Lactic Acid 2 6-->3 2--->1 7  -UA w/ Protein, negative for bacteria, WBC, Nitrites  -Hepatic Function Panel- AST/ALT  2069/1278, Albumin 3 1, T-Bili 1 99- Direct 0 77  -Hepatitis Panel, blood cultures  -NM Hepatobiliary pending  -Holding off the lasix for now  -Urine Output of -4147ml since admit  -BNP 4410--->1759  -avoid B-blockers  -Ceftriaxone and Flagyl on board  -Monitor Ins/Out  -Salt and fluid restriction        2) Sustained V-tach Episode     -Patient had a 32 seconds run of V-tach per EMS EN route to [address]  -Recent echo 06/26/2020 showing an EF of 20-25% with diffuse hypokinesis  -[facility] on [facility]   -[facility]- NSR, LAE, Prolonged QT  -8/14 Most Recent Potassium of 3 9  -Single Episode of Non Sustained V-Tach, resolved spontaneously  -Monitor electrolytes  -Monitor telemetry        3) Elevated Troponins     -7/20 [facility] from [facility] LAD 30%, 2nd Marginal 30% and Prox RCA 50% Stenosis  Recommended Medical Management  -presented to the ED w/ chest tightness  -Troponin elevation 0 07--> 04---> 10  -EKG- no ischemic changes, continue trend  -Troponemia likely due to acute decompensated HF         4) Coronary Artery Disease  -PCI 2008  --7/20 C from [facility] LAD 30%, 2nd Marginal 30% and Prox RCA 50% Stenosis  Recommended Medical Management  -ASA 81mg  -Dc'd Lipitor in setting of Transaminitis      5) Essential Hypertension     -Presented to ED w/ Elevated blood pressure  -BP range 161-93/  -On valsartan at home  -Holding Off in setting of LUCY and Lasix use (currently holding off)  -avoid b-blockers in setting of acute decompensated HF  -can use vasodilators and diuretics      Subjective/Objective   Chief Complaint: SOB    Subjective: Pt is feeling much better than yesterday  Breathing is much improved not requiring nasal canula  Endorses having fever and chills overnight  Denies any chest pain, palpitations, difficulty breathing, nausea/vomiting  Objective:     Vitals: /61 (BP Location: Right arm)   Pulse 97   Temp 97 5 °F (36 4 °C) (Oral)   Resp 19   Ht 5' 9 5" (1 765 m)   Wt (!) 146 kg (322 lb 15 6 oz)   SpO2 99%   BMI 47 01 kg/m²   Vitals:    08/14/20 0546   Weight: (!) 146 kg (322 lb 15 6 oz)     Orthostatic Blood Pressures      Most Recent Value   Blood Pressure  105/61 filed at 08/14/2020 0748   Patient Position - Orthostatic VS  Lying filed at 08/14/2020 0748            Intake/Output Summary (Last 24 hours) at 8/14/2020 0939  Last data filed at 8/14/2020 0801  Gross per 24 hour   Intake 1388 ml   Output 5535 ml   Net -4147 ml       Invasive Devices     Peripheral Intravenous Line            Peripheral IV 06/28/20 Dorsal (posterior); Right Hand 46 days    Peripheral IV 08/13/20 Right Wrist 1 day                Review of Systems: General ROS: negative  Respiratory ROS: mild non prod cough, shortness of breath, or wheezing  Cardiovascular ROS: no chest pain or dyspnea on exertion  Musculoskeletal ROS: positive for - swelling in leg     Physical Exam: /61 (BP Location: Right arm)   Pulse 97   Temp 97 5 °F (36 4 °C) (Oral)   Resp 19   Ht 5' 9 5" (1 765 m)   Wt (!) 146 kg (322 lb 15 6 oz)   SpO2 99%   BMI 47 01 kg/m²   General appearance: alert and oriented, in no acute distress  Lungs: clear to auscultation bilaterally  Heart: regular rate and rhythm, S1, S2 normal, no murmur, click, rub or gallop  Abdomen: seems distended, non tender   Extremities: edema trace LE B/L    Lab Results:   CBC with diff:   Results from last 7 days   Lab Units 08/14/20  0541   WBC Thousand/uL 11 77*   RBC Million/uL 5 30   HEMOGLOBIN g/dL 14 9   HEMATOCRIT % 47 4   MCV fL 89   MCH pg 28 1   MCHC g/dL 31 4   RDW % 15 3*   MPV fL 10 1   PLATELETS Thousands/uL 284     CMP:   Results from last 7 days   Lab Units 08/14/20  0541  08/13/20  0939   SODIUM mmol/L 137   < >  --    POTASSIUM mmol/L 3 9   < >  --    CHLORIDE mmol/L 103   < >  --    CO2 mmol/L 24   < >  --    CO2, I-STAT mmol/L  --   --  16*   BUN mg/dL 27*   < >  --    CREATININE mg/dL 1 31*   < >  --    GLUCOSE, ISTAT mg/dl  --   --  80   CALCIUM mg/dL 8 2*   < >  --    AST U/L 2,069*  --   --    ALT U/L 1,278*  --   --    ALK PHOS U/L 94  --   --    EGFR ml/min/1 73sq m 70   < >  --     < > = values in this interval not displayed       Troponin:   0   Lab Value Date/Time    TROPONINI 0 10 (H) 08/13/2020 0953    TROPONINI 0 04 08/13/2020 0358    TROPONINI 0 07 (H) 08/12/2020 2251    TROPONINI 0 09 (H) 06/26/2020 1434    TROPONINI 0 09 (H) 06/26/2020 0816     BNP:   Results from last 7 days   Lab Units 08/14/20  0541  08/13/20  0939   POTASSIUM mmol/L 3 9   < >  --    CHLORIDE mmol/L 103   < >  --    CO2 mmol/L 24   < >  --    CO2, I-STAT mmol/L  --   --  16*   BUN mg/dL 27*   < >  --    CREATININE mg/dL 1 31*   < >  --    GLUCOSE, ISTAT mg/dl  --   --  80   CALCIUM mg/dL 8 2*   < >  --    EGFR ml/min/1 73sq m 70   < >  --     < > = values in this interval not displayed  Coags:     TSH:   Results from last 7 days   Lab Units 08/12/20  2251   TSH 3RD GENERATON uIU/mL 7 884*     Magnesium:   Results from last 7 days   Lab Units 08/13/20  0953   MAGNESIUM mg/dL 2 2     Lipid Profile:     Imaging: I have personally reviewed pertinent reports   , I have personally reviewed pertinent films in PACS and I have personally reviewed pertinent films in PACS with a Radiologist   EKG:   VTE Pharmacologic Prophylaxis: Enoxaparin (Lovenox)  VTE Mechanical Prophylaxis: reason for no mechanical VTE prophylaxis lovenox    Counseling / Coordination of Care  Total time spent today 30 minutes  Greater than 50% of total time was spent with the patient and / or family counseling and / or coordination of care  A description of the counseling / coordination of care: Gerhardt Sep

## 2020-08-14 NOTE — SOCIAL WORK
Met with patient to complete assessment and explain role of CM  The patient lives with his wife in a two floor home  There are 2 TYREL and patient has first floor set up  He does go upstair to use the full bathroom  The patient says he is independent with care and uses a single point cane  He has no HHC and denies treatment for MH and D&A  The patient has no PCP and he was given an [employer] card with instructions on using it  CM reviewed d/c planning process including the following: identifying help at home, patient preference for d/c planning needs, Discharge Lounge, [facility] Meds to Bed program, availability of treatment team to discuss questions or concerns patient and/or family may have regarding understanding medications and recognizing signs and symptoms once discharged  CM also encouraged patient to follow up with all recommended appointments after discharge  Patient advised of importance for patient and family to participate in managing patients medical well being  Patient/caregiver received discharge checklist  Content reviewed  Patient/caregiver encouraged to participate in discharge plan of care prior to discharge home

## 2020-08-14 NOTE — ASSESSMENT & PLAN NOTE
· Patient is being evaluated for abnormal liver chemistry  · The patient had acute Severe elevation of liver enzymes (>15x ULN), normal enzymes 2 days prior to elevation, currently it has improved significantly   · Jaundice present: no  · R- ratio suggests- Hepatocellular  · Signs of acute liver failure?- no  · Likely etiology includes: ischemic hepatitis given episode of hypotension after diuresis and improvement of liver enzymes significantly over the past few days   · USG with doppler negative   · Negative for acute viral hepatitis   · Discontinue hepatotoxic medications  · Avoid alcohol consumption   · Will stop trending liver enzymes due to significant improvement and good blood pressures   · Can re-start statin on discharge

## 2020-08-14 NOTE — ASSESSMENT & PLAN NOTE
· Patient requiring 6LNC on presentation  · Likely etiology includes acute decompensated heart failure vs sepsis  · ABG was showing metabolic acidosis with respiratory compensation   · Patients source of sepsis would likely be cholecystitis  · Currently on empiric abx with ceftriaxone and flagyl  · Await blood cultures to return   · Currently seems to be doing much better from a breathing standpoint

## 2020-08-14 NOTE — ASSESSMENT & PLAN NOTE
· Findings of acute cholecystitis seen on CT abdomen   · Will empirically treat with ceftriaxone and flagyl as patient met SIRS Criteria  · HIDA scan negative

## 2020-08-15 ENCOUNTER — APPOINTMENT (INPATIENT)
Dept: RADIOLOGY | Facility: HOSPITAL | Age: 55
DRG: 291 | End: 2020-08-15
Payer: MEDICARE

## 2020-08-15 PROBLEM — J96.01 ACUTE RESPIRATORY FAILURE WITH HYPOXIA (HCC): Status: RESOLVED | Noted: 2020-08-13 | Resolved: 2020-08-15

## 2020-08-15 LAB
ALBUMIN SERPL BCP-MCNC: 2.8 G/DL (ref 3.5–5)
ALP SERPL-CCNC: 95 U/L (ref 46–116)
ALT SERPL W P-5'-P-CCNC: 986 U/L (ref 12–78)
ANION GAP SERPL CALCULATED.3IONS-SCNC: 10 MMOL/L (ref 4–13)
APAP SERPL-MCNC: <2 UG/ML (ref 10–20)
AST SERPL W P-5'-P-CCNC: 972 U/L (ref 5–45)
BASOPHILS # BLD AUTO: 0.03 THOUSANDS/ΜL (ref 0–0.1)
BASOPHILS NFR BLD AUTO: 0 % (ref 0–1)
BILIRUB DIRECT SERPL-MCNC: 0.36 MG/DL (ref 0–0.2)
BILIRUB SERPL-MCNC: 0.93 MG/DL (ref 0.2–1)
BUN SERPL-MCNC: 21 MG/DL (ref 5–25)
CALCIUM SERPL-MCNC: 8 MG/DL (ref 8.3–10.1)
CHLORIDE SERPL-SCNC: 103 MMOL/L (ref 100–108)
CO2 SERPL-SCNC: 22 MMOL/L (ref 21–32)
CREAT SERPL-MCNC: 1.13 MG/DL (ref 0.6–1.3)
EOSINOPHIL # BLD AUTO: 0.21 THOUSAND/ΜL (ref 0–0.61)
EOSINOPHIL NFR BLD AUTO: 2 % (ref 0–6)
ERYTHROCYTE [DISTWIDTH] IN BLOOD BY AUTOMATED COUNT: 15.5 % (ref 11.6–15.1)
GFR SERPL CREATININE-BSD FRML MDRD: 84 ML/MIN/1.73SQ M
GLUCOSE SERPL-MCNC: 106 MG/DL (ref 65–140)
GLUCOSE SERPL-MCNC: 123 MG/DL (ref 65–140)
GLUCOSE SERPL-MCNC: 162 MG/DL (ref 65–140)
GLUCOSE SERPL-MCNC: 183 MG/DL (ref 65–140)
GLUCOSE SERPL-MCNC: 268 MG/DL (ref 65–140)
HCT VFR BLD AUTO: 44.2 % (ref 36.5–49.3)
HGB BLD-MCNC: 14.4 G/DL (ref 12–17)
IMM GRANULOCYTES # BLD AUTO: 0.05 THOUSAND/UL (ref 0–0.2)
IMM GRANULOCYTES NFR BLD AUTO: 1 % (ref 0–2)
LDH SERPL-CCNC: 896 U/L (ref 81–234)
LYMPHOCYTES # BLD AUTO: 2.46 THOUSANDS/ΜL (ref 0.6–4.47)
LYMPHOCYTES NFR BLD AUTO: 27 % (ref 14–44)
MCH RBC QN AUTO: 29 PG (ref 26.8–34.3)
MCHC RBC AUTO-ENTMCNC: 32.6 G/DL (ref 31.4–37.4)
MCV RBC AUTO: 89 FL (ref 82–98)
MONOCYTES # BLD AUTO: 0.96 THOUSAND/ΜL (ref 0.17–1.22)
MONOCYTES NFR BLD AUTO: 11 % (ref 4–12)
NEUTROPHILS # BLD AUTO: 5.41 THOUSANDS/ΜL (ref 1.85–7.62)
NEUTS SEG NFR BLD AUTO: 59 % (ref 43–75)
NRBC BLD AUTO-RTO: 0 /100 WBCS
PLATELET # BLD AUTO: 265 THOUSANDS/UL (ref 149–390)
PMV BLD AUTO: 9.8 FL (ref 8.9–12.7)
POTASSIUM SERPL-SCNC: 3.9 MMOL/L (ref 3.5–5.3)
PROT SERPL-MCNC: 6.8 G/DL (ref 6.4–8.2)
RBC # BLD AUTO: 4.97 MILLION/UL (ref 3.88–5.62)
SODIUM SERPL-SCNC: 135 MMOL/L (ref 136–145)
WBC # BLD AUTO: 9.12 THOUSAND/UL (ref 4.31–10.16)

## 2020-08-15 PROCEDURE — 78226 HEPATOBILIARY SYSTEM IMAGING: CPT

## 2020-08-15 PROCEDURE — A9537 TC99M MEBROFENIN: HCPCS

## 2020-08-15 PROCEDURE — 93306 TTE W/DOPPLER COMPLETE: CPT | Performed by: INTERNAL MEDICINE

## 2020-08-15 PROCEDURE — 99232 SBSQ HOSP IP/OBS MODERATE 35: CPT | Performed by: INTERNAL MEDICINE

## 2020-08-15 PROCEDURE — 80048 BASIC METABOLIC PNL TOTAL CA: CPT | Performed by: INTERNAL MEDICINE

## 2020-08-15 PROCEDURE — 80329 ANALGESICS NON-OPIOID 1 OR 2: CPT | Performed by: INTERNAL MEDICINE

## 2020-08-15 PROCEDURE — G1004 CDSM NDSC: HCPCS

## 2020-08-15 PROCEDURE — 83615 LACTATE (LD) (LDH) ENZYME: CPT | Performed by: INTERNAL MEDICINE

## 2020-08-15 PROCEDURE — 80076 HEPATIC FUNCTION PANEL: CPT | Performed by: INTERNAL MEDICINE

## 2020-08-15 PROCEDURE — 82948 REAGENT STRIP/BLOOD GLUCOSE: CPT

## 2020-08-15 PROCEDURE — 85025 COMPLETE CBC W/AUTO DIFF WBC: CPT | Performed by: INTERNAL MEDICINE

## 2020-08-15 RX ORDER — LANOLIN ALCOHOL/MO/W.PET/CERES
3 CREAM (GRAM) TOPICAL
Status: DISCONTINUED | OUTPATIENT
Start: 2020-08-15 | End: 2020-08-21 | Stop reason: HOSPADM

## 2020-08-15 RX ORDER — FUROSEMIDE 40 MG/1
40 TABLET ORAL DAILY
Status: DISCONTINUED | OUTPATIENT
Start: 2020-08-16 | End: 2020-08-18

## 2020-08-15 RX ADMIN — METRONIDAZOLE 500 MG: 500 INJECTION, SOLUTION INTRAVENOUS at 05:18

## 2020-08-15 RX ADMIN — METRONIDAZOLE 500 MG: 500 INJECTION, SOLUTION INTRAVENOUS at 21:04

## 2020-08-15 RX ADMIN — MELATONIN 3 MG: at 21:04

## 2020-08-15 RX ADMIN — ENOXAPARIN SODIUM 60 MG: 80 INJECTION SUBCUTANEOUS at 21:04

## 2020-08-15 RX ADMIN — METRONIDAZOLE 500 MG: 500 INJECTION, SOLUTION INTRAVENOUS at 13:17

## 2020-08-15 RX ADMIN — CEFTRIAXONE SODIUM 2000 MG: 2 INJECTION, POWDER, FOR SOLUTION INTRAMUSCULAR; INTRAVENOUS at 13:16

## 2020-08-15 RX ADMIN — CEFTRIAXONE SODIUM 2000 MG: 2 INJECTION, POWDER, FOR SOLUTION INTRAMUSCULAR; INTRAVENOUS at 01:33

## 2020-08-15 RX ADMIN — ENOXAPARIN SODIUM 60 MG: 80 INJECTION SUBCUTANEOUS at 08:36

## 2020-08-15 NOTE — PROGRESS NOTES
Cardiology Progress Note - Ema Bhatia 62 y o  male MRN: [id number]    Unit/Bed#: [facility] 529-01 Encounter: [id number]    Assessment and Plan  1  Acute on chronic combined systolic and diastolic congestive heart failure  2  Probable cholecystitis status post HIDA scan  3  Nonischemic cardiomyopathy ejection fraction 15%  4  Pulmonary hypertension  5  Metabolic acidosis resolved  6  Transaminitis improved    Recommendations:  Overall he is feeling much better today  AST and ALT levels have come down significantly  Await HIDA scan results suspect he might have had a component of cholecystitis  I found 1 of the ambulance strips which does not show ventricular tachycardia  He had in his own personal possession  Resume oral Lasix tomorrow up titrate goal-directed medical therapy for his heart failure  Subjective:    No significant events overnight  Feels great today denies any chest pain, shortness of breath, palpitations  He has been up ambulating in the halls  ROS    Objective:   Vitals: Blood pressure 132/80, pulse 98, temperature 97 7 °F (36 5 °C), temperature source Oral, resp  rate 20, height 5' 9 5" (1 765 m), weight (!) 147 kg (323 lb 3 1 oz), SpO2 97 %  , Body mass index is 47 04 kg/m² ,   Orthostatic Blood Pressures      Most Recent Value   Blood Pressure  132/80 filed at 08/15/2020 1227   Patient Position - Orthostatic VS  Lying filed at 08/15/2020 8162         Systolic (61XEF), KUH:187 , Min:132 , ECM:797     Diastolic (74DEI), WEY:05, Min:76, Max:89      Intake/Output Summary (Last 24 hours) at 8/15/2020 1617  Last data filed at 8/15/2020 0600  Gross per 24 hour   Intake 100 ml   Output 325 ml   Net -225 ml     Weight (last 2 days)     Date/Time   Weight    08/15/20 0600   (!) 147 (323 19)    08/14/20 0546   (!) 146 (322 97)                Telemetry Review: No significant arrhythmias seen on telemetry review  EKG personally reviewed by Dinesh Jones, DO       Physical Exam  Vitals signs and nursing note reviewed  Constitutional:       General: He is not in acute distress  Appearance: He is well-developed  HENT:      Head: Normocephalic and atraumatic  Eyes:      Conjunctiva/sclera: Conjunctivae normal       Pupils: Pupils are equal, round, and reactive to light  Neck:      Musculoskeletal: Neck supple  Cardiovascular:      Rate and Rhythm: Normal rate and regular rhythm  Heart sounds: Normal heart sounds  No murmur  No friction rub  Pulmonary:      Effort: Pulmonary effort is normal  No respiratory distress  Breath sounds: Normal breath sounds  No wheezing or rales  Abdominal:      General: Bowel sounds are normal  There is no distension  Palpations: Abdomen is soft  Tenderness: There is no abdominal tenderness  There is no rebound  Musculoskeletal: Normal range of motion  General: No tenderness or deformity  Right lower le+ Edema present  Left lower le+ Edema present  Skin:     General: Skin is warm and dry  Findings: No erythema  Neurological:      Mental Status: He is alert and oriented to person, place, and time  Cranial Nerves: No cranial nerve deficit             Laboratory Results:  Results from last 7 days   Lab Units 20  0953 20  0358 20  2251   TROPONIN I ng/mL 0 10* 0 04 0 07*       CBC with diff:   Results from last 7 days   Lab Units 08/15/20  0450 20  0541 20  2349 20  0953 20  0939 20  2251   WBC Thousand/uL 9 12 11 77* 14 62* 21 30*  --  12 16*   HEMOGLOBIN g/dL 14 4 14 9 15 0 16 5  --  14 9   I STAT HEMOGLOBIN g/dl  --   --   --   --  17 3*  --    HEMATOCRIT % 44 2 47 4 46 2 52 7*  --  46 9   HEMATOCRIT, ISTAT %  --   --   --   --  51*  --    MCV fL 89 89 89 93  --  91   PLATELETS Thousands/uL 265 284 283 310  --  287   MCH pg 29 0 28 1 28 7 29 2  --  28 8   MCHC g/dL 32 6 31 4 32 5 31 3*  --  31 8   RDW % 15 5* 15 3* 15 7* 16 6*  --  15 0   MPV fL 9 8 10 1 10 0 10 0  --  9 6   NRBC AUTO /100 WBCs 0 0 0  --   --  0         CMP:  Results from last 7 days   Lab Units 08/15/20  04520  0520  1720 20  0953 20  0939 20  2251   POTASSIUM mmol/L 3 9 3 9 4 0 5 9*  --  3 9   CHLORIDE mmol/L 103 103 103 106  --  104   CO2 mmol/L 22 24 22 16*  --  22   CO2, I-STAT mmol/L  --   --   --   --  16*  --    BUN mg/dL 21 27* 25 23  --  21   CREATININE mg/dL 1 13 1 31* 1 64* 1 73*  --  1 55*   GLUCOSE, ISTAT mg/dl  --   --   --   --  80  --    CALCIUM mg/dL 8 0* 8 2* 8 7 8 4  --  8 3   AST U/L 972* 2,069*  --   --   --  40   ALT U/L 986* 1,278*  --   --   --  46   ALK PHOS U/L 95 94  --   --   --  72   EGFR ml/min/1 73sq m 84 70 53 50  --  57         BMP:  Results from last 7 days   Lab Units 08/15/20  0450 08/14/20  0541 08/13/20  1720 20  0953 20  0939 20  2251   POTASSIUM mmol/L 3 9 3 9 4 0 5 9*  --  3 9   CHLORIDE mmol/L 103 103 103 106  --  104   CO2 mmol/L 22 24 22 16*  --  22   CO2, I-STAT mmol/L  --   --   --   --  16*  --    BUN mg/dL 21 27* 25 23  --  21   CREATININE mg/dL 1 13 1 31* 1 64* 1 73*  --  1 55*   GLUCOSE, ISTAT mg/dl  --   --   --   --  80  --    CALCIUM mg/dL 8 0* 8 2* 8 7 8 4  --  8 3       BNP: No results for input(s): BNP in the last 72 hours      Magnesium:   Results from last 7 days   Lab Units 20  0953 20  2251   MAGNESIUM mg/dL 2 2 1 7       Coags:       TSH:        Hemoglobin A1C       Lipid Profile:       Cardiac testing:   Results for orders placed during the hospital encounter of 20   Echo complete with contrast if indicated    Narrative [address]yll[address]  [facility], [address] [place] [address]  [phone]    Transthoracic Echocardiogram  2D, M-mode, Doppler, and Color Doppler    Study date:  14-Aug-2020    Patient: Aidee Hernandez  MR number: [id number]  Account number: [de-identified]  : 1965  Age: 62 years  Gender: Male  Status: Inpatient  Location: Bedside  Height: 69 in  Weight: 322 1 lb  BP: 135/ 70 mmHg    Indications: Pulmonary hypertension  Diagnoses: I27 0 - Primary pulmonary hypertension    Sonographer:  DEV Salgado  Referring Physician:  Diomedes Parker DO  Group:  [facility]  Interpreting Physician:  Destiny Mancuso DO    SUMMARY    LEFT VENTRICLE:  The ventricle was dilated  Systolic function was severely reduced  Ejection fraction was estimated in the range of 15 % to 20 %  There was severe diffuse hypokinesis with regional variations  Wall thickness was increased  Doppler parameters were consistent with high ventricular filling pressure  RIGHT VENTRICLE:  The ventricle was dilated  Systolic function was reduced  LEFT ATRIUM:  The atrium was moderately dilated  RIGHT ATRIUM:  The atrium was moderately dilated  MITRAL VALVE:  There was mild to moderate regurgitation  TRICUSPID VALVE:  There was moderate to severe regurgitation  Estimated peak PA pressure was 59 mmHg  IVC, HEPATIC VEINS:  The inferior vena cava was dilated, with poor inspiratory collapse, consistent with elevated right atrial pressure  PERICARDIUM:  A trace pericardial effusion was identified  HISTORY: PRIOR HISTORY: Congestive heart failure  Risk factors: hypertension, diabetes, morbid obesity, and a history of current cigarette use (within the last month)  PROCEDURE: The procedure was performed at the bedside  This was a routine study  The transthoracic approach was used  The study included complete 2D imaging, M-mode, complete spectral Doppler, and color Doppler  The heart rate was 98 bpm,  at the start of the study  Intravenous contrast (  4ml of Definity) was administered  Echocardiographic views were limited due to decreased penetration and lung interference  This was a technically difficult study  LEFT VENTRICLE: The ventricle was dilated  Systolic function was severely reduced   Ejection fraction was estimated in the range of 15 % to 20 %  There was severe diffuse hypokinesis with regional variations  Wall thickness was increased  DOPPLER: Left ventricular diastolic function parameters were abnormal  Doppler parameters were consistent with high ventricular filling pressure  RIGHT VENTRICLE: The ventricle was dilated  Systolic function was reduced  LEFT ATRIUM: The atrium was moderately dilated  RIGHT ATRIUM: The atrium was moderately dilated  MITRAL VALVE: Valve structure was normal  There was normal leaflet separation  DOPPLER: The transmitral velocity was within the normal range  There was no evidence for stenosis  There was mild to moderate regurgitation  AORTIC VALVE: The valve was trileaflet  Leaflets exhibited normal thickness and normal cuspal separation  DOPPLER: Transaortic velocity was within the normal range  There was no evidence for stenosis  There was no regurgitation  TRICUSPID VALVE: The valve structure was normal  There was normal leaflet separation  DOPPLER: The transtricuspid velocity was within the normal range  There was no evidence for stenosis  There was moderate to severe regurgitation  Estimated peak PA pressure was 59 mmHg  PULMONIC VALVE: Not well visualized  PERICARDIUM: A trace pericardial effusion was identified  AORTA: The root exhibited normal size  SYSTEMIC VEINS: IVC: The inferior vena cava was dilated, with poor inspiratory collapse, consistent with elevated right atrial pressure      SYSTEM MEASUREMENT TABLES    2D  %FS: 6 42 %  Ao Diam: 2 97 cm  EDV(Teich): 276 74 ml  EF(Teich): 13 89 %  ESV(Teich): 238 28 ml  IVSd: 0 88 cm  LA Diam: 5 2 cm  LAAs A2C: 31 47 cm2  LAAs A4C: 25 91 cm2  LAESV A-L A2C: 122 99 ml  LAESV A-L A4C: 87 16 ml  LAESV MOD A2C: 116 17 ml  LAESV MOD A4C: 80 62 ml  LAESV(A-L): 105 87 ml  LAESV(MOD BP): 98 9 ml  LALs A2C: 6 84 cm  LALs A4C: 6 54 cm  LVEDV MOD A4C: 219 42 ml  LVEF MOD A4C: 18 73 %  LVESV MOD A4C: 178 31 ml  LVIDd: 7 25 cm  LVIDs: 6 79 cm  LVLd A4C: 9 26 cm  LVLs A4C: 9 54 cm  LVPWd: 0 98 cm  RAAs: 28 13 cm2  RAESV A-L: 99 41 ml  RAESV MOD: 97 19 ml  RALs: 6 75 cm  RVIDd: 4 41 cm  SV MOD A4C: 41 11 ml  SV(Teich): 38 45 ml    CW  MR VTI: 131 46 cm  MR Vmax: 4 45 m/s  MR Vmean: 3 49 m/s  MR maxP 15 mmHg  MR meanP 35 mmHg  TR Vmax: 3 32 m/s  TR maxP 14 mmHg    MM  TAPSE: 1 37 cm    PW  MV A Sushil: 0 41 m/s  MV Dec [place]: 10 64 m/s2  MV DecT: 103 83 ms  MV E Sushil: 1 06 m/s  MV E/A Ratio: 2 8  MV PHT: 30 11 ms  MVA By PHT: 7 31 cm2    [facility]    Prepared and electronically signed by    Virgilio Lacy DO  Signed 15-Aug-2020 15:10:27       No results found for this or any previous visit  No results found for this or any previous visit  No results found for this or any previous visit      Meds/Allergies   all current active meds have been reviewed  Medications Prior to Admission   Medication    cyclobenzaprine (FLEXERIL) 10 mg tablet    diclofenac (VOLTAREN) 75 mg EC tablet    irbesartan-hydrochlorothiazide (AVALIDE) 300-12 5 MG per tablet    meloxicam (MOBIC) 15 mg tablet    methocarbamol (ROBAXIN) 750 mg tablet    pregabalin (LYRICA) 200 MG capsule          Assessment:  Principal Problem:    Acute cholecystitis  Active Problems:    Spondylolisthesis at L4-L5 level    Foraminal stenosis of lumbar region    Chronic obstructive lung disease (HCC)    Chronic pain    GERD (gastroesophageal reflux disease)    Morbid obesity (HCC)    Essential hypertension    Tobacco use disorder    Acute combined systolic and diastolic congestive heart failure (HCC)    Sustained VT (ventricular tachycardia) (HCC)    Type 2 MI (myocardial infarction) (HCC)    CKD (chronic kidney disease) stage 2, GFR 60-89 ml/min    Hypothyroidism (acquired)    Pre-diabetes    Hyperlipidemia    Vitamin D deficiency    Pericardial effusion    Transaminitis            Counseling / Coordination of Care  Total floor / unit time spent today 30 minutes  Greater than 50% of total time was spent with the patient and / or family counseling and / or coordination of care  A description of the counseling / coordination of care: ADRIAN pt

## 2020-08-15 NOTE — PROGRESS NOTES
INTERNAL MEDICINE RESIDENCY PROGRESS NOTE     Name: Carroll Cardenas   Age & Sex: 62 y o  male   MRN: [id number]  Unit/Bed#: [facility] 529-01   Encounter: [id number]  Team: SOD Team B     PATIENT INFORMATION     Name: Carroll Cardenas   Age & Sex: 62 y o  male   MRN: [id number]  Hospital Stay Days: 2    ASSESSMENT/PLAN     Principal Problem:    Acute cholecystitis  Active Problems:    Transaminitis    Acute combined systolic and diastolic congestive heart failure (HCC)    Sustained VT (ventricular tachycardia) (HCC)    Type 2 MI (myocardial infarction) ([facility] )    Spondylolisthesis at L4-L5 level    Foraminal stenosis of lumbar region    Chronic obstructive lung disease (HCC)    Chronic pain    GERD (gastroesophageal reflux disease)    Morbid obesity ([facility] )    Essential hypertension    Tobacco use disorder    CKD (chronic kidney disease) stage 2, GFR 60-89 ml/min    Hypothyroidism (acquired)    Pre-diabetes    Hyperlipidemia    Vitamin D deficiency    Pericardial effusion      * Acute cholecystitis  Assessment & Plan  · Findings of acute cholecystitis seen on CT abdomen   · Will empirically treat with ceftriaxone and flagyl as patient met SIRS Criteria  · Will rule out definitively will HIDA scan   · Continue to monitor     Transaminitis  Assessment & Plan  · Patient is being evaluated for abnormal liver chemistry  · The patient has acute Severe elevation of liver enzymes (>15x ULN), normal enzymes 2 days ago   · Jaundice present: no  · R- ratio suggests- Hepatocellular  · Signs of acute liver failure?- no  · Likely etiology includes: ischemic hepatitis given episode of hypotension after diuresis and improvement of liver enzymes  · Checked USG abdomen with doppler   · Negative for acute viral hepatitis   · Discontinue hepatotoxic medications  · Avoid alcohol consumption   · Continue to trend enzymes     Acute respiratory failure with hypoxia (HCC)resolved as of 8/15/2020  Assessment & Plan  · Patient requiring 6LNC on presentation  · Likely etiology includes acute decompensated heart failure vs sepsis  · ABG was showing metabolic acidosis with respiratory compensation   · Patients source of sepsis would likely be cholecystitis  · Currently on empiric abx with ceftriaxone and flagyl  · Await blood cultures to return   · Currently seems to be doing much better from a breathing standpoint     Acute combined systolic and diastolic congestive heart failure (HCC)  Assessment & Plan  Wt Readings from Last 3 Encounters:   08/12/20 (!) 154 kg (339 lb 1 1 oz)   06/29/20 (!) 143 kg (314 lb 2 5 oz)   10/28/19 (!) 153 kg (337 lb)     · Echo 06/26/2020-- EF of 20 25% and Grade 2 DD  · Mild to moderate pericardial effusion on CTA chest  · Received a total of 120mg iv lasix on 08/13  · Currently off lasix   · Cardiology is following, will await recommendations for futher diuresis     Sustained VT (ventricular tachycardia) [facility] )  Assessment & Plan  · Patient had 32 seconds run of V-tach per EMS EN route to [facility]  · Likely secondary to ischemic cardiomyopathy  · Recent echo 06/26/2020 showing an EF of 20-25% with diffuse hypokinesis  · Started on amiodarone at [facility], currently off drip  · Monitor electrolytes  · Cardiology following  · Monitor tele      Type 2 MI (myocardial infarction) ([facility] )  Assessment & Plan  · Troponin peaked at 0 07 and then downtrended  · No ischemic changes on EKG and patient had recent cardiac cath   · Likely in the setting of Acute decompensated heart failure     Vitamin D deficiency  Assessment & Plan  · Vitamin-D level around 13  · Recommended vitamin-D supplementation/ dietary changes/sun exposure  · Follow-up outpatient    Hyperlipidemia  Assessment & Plan  · Patient not taking any medication  · started high-intensity statin    Pre-diabetes  Assessment & Plan  · Hemoglobin A1c 6 2  · Educated on weight loss/dietary modification  · Not currently on any medication    Hypothyroidism (acquired)  Assessment & Plan  · Patient with elevated TSH >7  · Follow-up fT4  · Patient not taking any medication  · Follow-up outpatient PCP    CKD (chronic kidney disease) stage 2, GFR 60-89 ml/min  Assessment & Plan  · Creatinine 1 55 on presentation currently improved and back to baseline around 1 3-1 4  · Monitor BMP    Tobacco use disorder  Assessment & Plan  · Smoking cessation advised  · States he quit last month      Essential hypertension  Assessment & Plan  · Patient on home ARB-HCTZ 300-12 5 mg daily  · Will continue  · Monitor BMP, BP    Morbid obesity (HCC)  Assessment & Plan  · Advise Weight loss /bariatric surgery  · Follow-up palpation  · Educated on lifestyle modification      GERD (gastroesophageal reflux disease)  Assessment & Plan  · Not on home medications  · Follow up out pt pcp  · Avoid NSAIDs and triggering foods  · Weight loss advised      Disposition: Continue inpatient care     SUBJECTIVE     Patient seen and examined  No acute events overnight  Patient denies chest pain, palpitations, SOB, cough, abdominal pain, nausea, vomiting, constipation, diarrhea, fevers/chills, headaches, dysuria  OBJECTIVE     Vitals:    08/14/20 2000 08/15/20 0238 08/15/20 0600 08/15/20 0726   BP:  149/79  134/89   BP Location:  Right arm     Pulse:  101  (!) 116   Resp:  20  20   Temp:  98 3 °F (36 8 °C)  97 6 °F (36 4 °C)   TempSrc:  Oral     SpO2: 98% 98%  96%   Weight:   (!) 147 kg (323 lb 3 1 oz)    Height:          Temperature:   Temp (24hrs), Av 1 °F (36 7 °C), Min:97 6 °F (36 4 °C), Max:98 3 °F (36 8 °C)    Temperature: 97 6 °F (36 4 °C)  Intake & Output:  I/O       701 -  0700 701 - 08/15 0700 08/15 07 -  0700    P  O  998 360     IV Piggyback 150 250     Total Intake(mL/kg) 1148 (7 9) 610 (4 1)     Urine (mL/kg/hr) 5535 (1 6) 1075 (0 3)     Total Output 5535 1075     Net -3238 -848                Weights:   IBW: 71 85 kg    Body mass index is 47 04 kg/m²    Weight (last 2 days)     Date/Time Weight    08/15/20 0600   (!) 147 (323 19)    08/14/20 0546   (!) 146 (322 97)            Physical Exam:   GENERAL: NAD  HEENT:  NC/AT, MMM, no scleral icterus  CARDIAC:  RRR, +S1/S2, no S3/S4 heard, no m/g/r  PULMONARY:  CTA B/L, no wheezing/rales/rhonci, non-labored breathing  ABDOMEN:  Soft, NT/ND, +BS, no rebound/guarding/rigidity  Extremities:  2+ Pulses in DP/PT  No edema, cyanosis, or clubbing  NEUROLOGIC:  Alert/oriented x3  SKIN:  No rashes or erythema    LABORATORY DATA     Labs: I have personally reviewed pertinent reports  Results from last 7 days   Lab Units 08/15/20  0450 08/14/20  0541 08/13/20  2349   WBC Thousand/uL 9 12 11 77* 14 62*   HEMOGLOBIN g/dL 14 4 14 9 15 0   HEMATOCRIT % 44 2 47 4 46 2   PLATELETS Thousands/uL 265 284 283   NEUTROS PCT % 59 63 60   MONOS PCT % 11 10 11      Results from last 7 days   Lab Units 08/15/20  0450 08/14/20  0541 08/13/20  1720  08/13/20  0939 08/12/20  2251   POTASSIUM mmol/L 3 9 3 9 4 0   < >  --  3 9   CHLORIDE mmol/L 103 103 103   < >  --  104   CO2 mmol/L 22 24 22   < >  --  22   CO2, I-STAT mmol/L  --   --   --   --  16*  --    BUN mg/dL 21 27* 25   < >  --  21   CREATININE mg/dL 1 13 1 31* 1 64*   < >  --  1 55*   CALCIUM mg/dL 8 0* 8 2* 8 7   < >  --  8 3   ALK PHOS U/L 95 94  --   --   --  72   ALT U/L 986* 1,278*  --   --   --  46   AST U/L 972* 2,069*  --   --   --  40   GLUCOSE, ISTAT mg/dl  --   --   --   --  80  --     < > = values in this interval not displayed       Results from last 7 days   Lab Units 08/13/20  0953 08/12/20  2251   MAGNESIUM mg/dL 2 2 1 7     Results from last 7 days   Lab Units 08/13/20  0953   PHOSPHORUS mg/dL 4 9*          Results from last 7 days   Lab Units 08/13/20  2349   LACTIC ACID mmol/L 1 7     Results from last 7 days   Lab Units 08/13/20  0953 08/13/20  0358 08/12/20  2251   TROPONIN I ng/mL 0 10* 0 04 0 07*       IMAGING & DIAGNOSTIC TESTING     Radiology Results: I have personally reviewed pertinent reports  Xr Chest Portable    Result Date: 8/13/2020  Impression: No acute cardiopulmonary disease  Workstation performed: [id number]     Pe Study With Ct Abdomen And Pelvis With Contrast    Result Date: 8/13/2020  Impression: Extensive bilateral striated nephrograms concerning for pyelonephritis versus atypical infiltrating process  Recommend follow-up to resolution  Mild to moderate pericardial effusion  Gallbladder wall is thickened and enhancing concerning for cholecystitis  Workstation performed: [id number]     Other Diagnostic Testing: I have personally reviewed pertinent reports  ACTIVE MEDICATIONS     Current Facility-Administered Medications   Medication Dose Route Frequency    aspirin (ECOTRIN LOW STRENGTH) EC tablet 81 mg  81 mg Oral Daily    cefTRIAXone (ROCEPHIN) 2,000 mg in dextrose 5 % 50 mL IVPB  2,000 mg Intravenous Q12H    enoxaparin (LOVENOX) subcutaneous injection 60 mg  60 mg Subcutaneous BID    metroNIDAZOLE (FLAGYL) IVPB (premix) 500 mg 100 mL  500 mg Intravenous Q8H       VTE Pharmacologic Prophylaxis: Enoxaparin (Lovenox)  VTE Mechanical Prophylaxis: sequential compression device    Portions of the record may have been created with voice recognition software  Occasional wrong word or "sound a like" substitutions may have occurred due to the inherent limitations of voice recognition software    Read the chart carefully and recognize, using context, where substitutions have occurred   ==  Ashish Curtis MD  [address]  Internal Medicine Residency PGY-3

## 2020-08-16 ENCOUNTER — APPOINTMENT (INPATIENT)
Dept: RADIOLOGY | Facility: HOSPITAL | Age: 55
DRG: 291 | End: 2020-08-16
Payer: MEDICARE

## 2020-08-16 LAB
GLUCOSE SERPL-MCNC: 119 MG/DL (ref 65–140)
GLUCOSE SERPL-MCNC: 122 MG/DL (ref 65–140)
GLUCOSE SERPL-MCNC: 124 MG/DL (ref 65–140)
GLUCOSE SERPL-MCNC: 141 MG/DL (ref 65–140)

## 2020-08-16 PROCEDURE — 82948 REAGENT STRIP/BLOOD GLUCOSE: CPT

## 2020-08-16 PROCEDURE — 76705 ECHO EXAM OF ABDOMEN: CPT

## 2020-08-16 PROCEDURE — 99232 SBSQ HOSP IP/OBS MODERATE 35: CPT | Performed by: INTERNAL MEDICINE

## 2020-08-16 RX ORDER — FAMOTIDINE 20 MG/1
20 TABLET, FILM COATED ORAL DAILY
Status: DISCONTINUED | OUTPATIENT
Start: 2020-08-17 | End: 2020-08-21 | Stop reason: HOSPADM

## 2020-08-16 RX ORDER — METOPROLOL SUCCINATE 25 MG/1
25 TABLET, EXTENDED RELEASE ORAL DAILY
Status: DISCONTINUED | OUTPATIENT
Start: 2020-08-16 | End: 2020-08-19

## 2020-08-16 RX ORDER — METRONIDAZOLE 500 MG/1
500 TABLET ORAL EVERY 8 HOURS SCHEDULED
Status: DISCONTINUED | OUTPATIENT
Start: 2020-08-16 | End: 2020-08-16

## 2020-08-16 RX ADMIN — ASPIRIN 81 MG: 81 TABLET, COATED ORAL at 08:45

## 2020-08-16 RX ADMIN — METRONIDAZOLE 500 MG: 500 INJECTION, SOLUTION INTRAVENOUS at 05:29

## 2020-08-16 RX ADMIN — CEFTRIAXONE SODIUM 2000 MG: 2 INJECTION, POWDER, FOR SOLUTION INTRAMUSCULAR; INTRAVENOUS at 02:11

## 2020-08-16 RX ADMIN — ENOXAPARIN SODIUM 60 MG: 80 INJECTION SUBCUTANEOUS at 08:46

## 2020-08-16 RX ADMIN — ENOXAPARIN SODIUM 60 MG: 80 INJECTION SUBCUTANEOUS at 21:39

## 2020-08-16 RX ADMIN — FUROSEMIDE 40 MG: 40 TABLET ORAL at 08:45

## 2020-08-16 RX ADMIN — MELATONIN 3 MG: at 21:37

## 2020-08-16 NOTE — PROGRESS NOTES
The metronidazole has been converted to Oral per [facility] IV-to-PO Auto-Conversion Protocol for Adults as approved by the Pharmacy and Therapeutics Committee  The patient met all eligible criteria:  3 Age = 23years old   2) Received at least one dose of the IV form   3) Receiving at least one other scheduled oral/enteral medication   4) Tolerating an oral/enteral diet   and did not have any exclusions:   1) Critical care patient   2) Active GI bleed (IF assessing H2RAs or PPIs)   3) Continuous tube feeding (IF assessing cipro, doxycycline, levofloxacin, minocycline, rifampin, or voriconazole)   4) Receiving PO vancomycin (IF assessing metronidazole)   5) Persistent nausea and/or vomiting   6) Ileus or gastrointestinal obstruction   7) Brandie/nasogastric tube set for continuous suction   8) Specific order not to automatically convert to PO (in the order's comments or if discussed in the most recent Infectious Disease or primary team's progress notes)

## 2020-08-16 NOTE — PLAN OF CARE
Problem: Potential for Falls  Goal: Patient will remain free of falls  Description: INTERVENTIONS:  - Assess patient frequently for physical needs  -  Identify cognitive and physical deficits and behaviors that affect risk of falls    -  [place] fall precautions as indicated by assessment   - Educate patient/family on patient safety including physical limitations  - Instruct patient to call for assistance with activity based on assessment  - Modify environment to reduce risk of injury  - Consider OT/PT consult to assist with strengthening/mobility  Outcome: Progressing     Problem: CARDIOVASCULAR - ADULT  Goal: Maintains optimal cardiac output and hemodynamic stability  Description: INTERVENTIONS:  - Monitor I/O, vital signs and rhythm  - Monitor for S/S and trends of decreased cardiac output  - Administer and titrate ordered vasoactive medications to optimize hemodynamic stability  - Assess quality of pulses, skin color and temperature  - Assess for signs of decreased coronary artery perfusion  - Instruct patient to report change in severity of symptoms  Outcome: Progressing  Goal: Absence of cardiac dysrhythmias or at baseline rhythm  Description: INTERVENTIONS:  - Continuous cardiac monitoring, vital signs, obtain 12 lead EKG if ordered  - Administer antiarrhythmic and heart rate control medications as ordered  - Monitor electrolytes and administer replacement therapy as ordered  Outcome: Progressing     Problem: PAIN - ADULT  Goal: Verbalizes/displays adequate comfort level or baseline comfort level  Description: Interventions:  - Encourage patient to monitor pain and request assistance  - Assess pain using appropriate pain scale  - Administer analgesics based on type and severity of pain and evaluate response  - Implement non-pharmacological measures as appropriate and evaluate response  - Consider cultural and social influences on pain and pain management  - Notify physician/advanced practitioner if interventions unsuccessful or patient reports new pain  Outcome: Progressing     Problem: INFECTION - ADULT  Goal: Absence or prevention of progression during hospitalization  Description: INTERVENTIONS:  - Assess and monitor for signs and symptoms of infection  - Monitor lab/diagnostic results  - Monitor all insertion sites, i e  indwelling lines, tubes, and drains  - [place] appropriate cooling/warming therapies per order  - Administer medications as ordered  - Instruct and encourage patient and family to use good hand hygiene technique  - Identify and instruct in appropriate isolation precautions for identified infection/condition  Outcome: Progressing  Goal: Absence of fever/infection during neutropenic period  Description: INTERVENTIONS:  - Monitor WBC    Outcome: Progressing     Problem: SAFETY ADULT  Goal: Patient will remain free of falls  Description: INTERVENTIONS:  - Assess patient frequently for physical needs  -  Identify cognitive and physical deficits and behaviors that affect risk of falls    -  [place] fall precautions as indicated by assessment   - Educate patient/family on patient safety including physical limitations  - Instruct patient to call for assistance with activity based on assessment  - Modify environment to reduce risk of injury  - Consider OT/PT consult to assist with strengthening/mobility  Outcome: Progressing  Goal: Maintain or return to baseline ADL function  Description: INTERVENTIONS:  -  Assess patient's ability to carry out ADLs; assess patient's baseline for ADL function and identify physical deficits which impact ability to perform ADLs (bathing, care of mouth/teeth, toileting, grooming, dressing, etc )  - Assess/evaluate cause of self-care deficits   - Assess range of motion  - Assess patient's mobility; develop plan if impaired  - Assess patient's need for assistive devices and provide as appropriate  - Encourage maximum independence but intervene and supervise when necessary  - Involve family in performance of ADLs  - Assess for home care needs following discharge   - Consider OT consult to assist with ADL evaluation and planning for discharge  - Provide patient education as appropriate  Outcome: Progressing  Goal: Maintain or return mobility status to optimal level  Description: INTERVENTIONS:  - Assess patient's baseline mobility status (ambulation, transfers, stairs, etc )    - Identify cognitive and physical deficits and behaviors that affect mobility  - Identify mobility aids required to assist with transfers and/or ambulation (gait belt, sit-to-stand, lift, walker, cane, etc )  - [place] fall precautions as indicated by assessment  - Record patient progress and toleration of activity level on Mobility SBAR; progress patient to next Phase/Stage  - Instruct patient to call for assistance with activity based on assessment  - Consider rehabilitation consult to assist with strengthening/weightbearing, etc   Outcome: Progressing     Problem: DISCHARGE PLANNING  Goal: Discharge to home or other facility with appropriate resources  Description: INTERVENTIONS:  - Identify barriers to discharge w/patient and caregiver  - Arrange for needed discharge resources and transportation as appropriate  - Identify discharge learning needs (meds, wound care, etc )  - Arrange for interpretive services to assist at discharge as needed  - Refer to Case Management Department for coordinating discharge planning if the patient needs post-hospital services based on physician/advanced practitioner order or complex needs related to functional status, cognitive ability, or social support system  Outcome: Progressing     Problem: Knowledge Deficit  Goal: Patient/family/caregiver demonstrates understanding of disease process, treatment plan, medications, and discharge instructions  Description: Complete learning assessment and assess knowledge base    Interventions:  - Provide teaching at level of understanding  - Provide teaching via preferred learning methods  Outcome: Progressing

## 2020-08-16 NOTE — PROGRESS NOTES
IM Residency Progress Note   Unit/Bed#: [facility] 529-01 Encounter: [id number]  SOD Team B       Tristan Ugarte 62 y o  male [id number]    Hospital Stay Days: 3      Assessment/Plan:    Principal Problem:    Acute cholecystitis  Active Problems:    Spondylolisthesis at L4-L5 level    Foraminal stenosis of lumbar region    Chronic obstructive lung disease (HCC)    Chronic pain    GERD (gastroesophageal reflux disease)    Morbid obesity ([address] )    Essential hypertension    Tobacco use disorder    Acute combined systolic and diastolic congestive heart failure (HCC)    Sustained VT (ventricular tachycardia) (HCC)    Type 2 MI (myocardial infarction) (HCC)    CKD (chronic kidney disease) stage 2, GFR 60-89 ml/min    Hypothyroidism (acquired)    Pre-diabetes    Hyperlipidemia    Vitamin D deficiency    Pericardial effusion    Transaminitis    * Acute cholecystitis  Assessment & Plan  · Findings of acute cholecystitis seen on CT abdomen, HIDA unremarkable; Dx less likely at this time  · Treated empirically with ceftriaxone and flagyl as patient met SIRS Criteria, but will discontinue at this  time   VS wnl and BCX NG  · Continue to monitor      Transaminitis  Assessment & Plan  · Patient is being evaluated for abnormal liver chemistry  · The patient has acute Severe elevation of liver enzymes (>15x ULN), normal enzymes 2 days ago   · Jaundice present: no  · R- ratio suggests- Hepatocellular  · Signs of acute liver failure?- no  · Likely etiology includes: ischemic hepatitis given episode of hypotension after diuresis and improvement of liver enzymes  · Checked USG abdomen with doppler   · Negative for acute viral hepatitis   · Discontinue hepatotoxic medications  · Avoid alcohol consumption   · Continue to trend enzymes      Acute respiratory failure with hypoxia (HCC)resolved as of 8/15/2020  Assessment & Plan  · Patient requiring 6LNC on presentation  · Likely etiology includes acute decompensated heart failure   · ABG was showing metabolic acidosis with respiratory compensation   · Currently seems to be doing much better from a breathing standpoint      Acute combined systolic and diastolic congestive heart failure [facility])  Assessment & Plan     · Echo 06/26/2020-- EF of 20 25% and Grade 2 DD  · Mild to moderate pericardial effusion on CTA chest  · Received a total of 120mg iv lasix on 08/13  · Cardiology is following, diuresis and further management per cards       Sustained VT (ventricular tachycardia) ([facility] [address] )  Assessment & Plan  · Patient had 32 seconds run of V-tach per EMS EN route to [facility]  · Likely secondary to ischemic cardiomyopathy  · Recent echo 06/26/2020 showing an EF of 20-25% with diffuse hypokinesis  · Started on amiodarone at [facility], currently off drip  · Monitor electrolytes  · Cardiology following  · Monitor tele        Type 2 MI (myocardial infarction) ([facility] )  Assessment & Plan  · Troponin peaked at 0 07 and then downtrended  · No ischemic changes on EKG and patient had recent cardiac cath   · Likely in the setting of Acute decompensated heart failure      Vitamin D deficiency  Assessment & Plan  · Vitamin-D level around 13  · Recommended vitamin-D supplementation/ dietary changes/sun exposure  · Follow-up outpatient     Hyperlipidemia  Assessment & Plan  · Patient not taking any medication  · started high-intensity statin     Pre-diabetes  Assessment & Plan  · Hemoglobin A1c 6 2  · Educated on weight loss/dietary modification  · Not currently on any medication     Hypothyroidism (acquired)  Assessment & Plan  · Patient with elevated TSH >7  · Follow-up fT4  · Patient not taking any medication  · Follow-up outpatient PCP     CKD (chronic kidney disease) stage 2, GFR 60-89 ml/min  Assessment & Plan  · Creatinine 1 55 on presentation currently improved and back to baseline around 1 3-1 4  · Monitor BMP     Tobacco use disorder  Assessment & Plan  · Smoking cessation advised  · States he quit last month        Essential hypertension  Assessment & Plan  · Patient on home ARB-HCTZ 300-12 5 mg daily  · Will continue  · Monitor BMP, BP     Morbid obesity ([address] )  Assessment & Plan  · Advise Weight loss /bariatric surgery  · Follow-up palpation  · Educated on lifestyle modification        GERD (gastroesophageal reflux disease)  Assessment & Plan  · Not on home medications  · Follow up out pt pcp  · Avoid NSAIDs and triggering foods  · Weight loss advised             Disposition: inpatient       Subjective:   No acute events overnight per patient or per nursing  Patient reports feeling tired this morning to poor sleep  Vitals: Temp (24hrs), Av 7 °F (36 5 °C), Min:97 3 °F (36 3 °C), Max:98 2 °F (36 8 °C)  Current: Temperature: (!) 97 3 °F (36 3 °C)  Vitals:    08/15/20 2027 08/15/20 2326 20 0318 20 0737   BP: 136/74 164/89 130/60 128/89   BP Location:  Left arm Left arm    Pulse: 101 105 (!) 106 98   Resp: 19 18 17 20   Temp: 97 6 °F (36 4 °C) 98 2 °F (36 8 °C) 97 9 °F (36 6 °C) (!) 97 3 °F (36 3 °C)   TempSrc: Oral Oral Oral    SpO2: 97% 98% 97% 97%   Weight:       Height:        Body mass index is 47 04 kg/m²  I/O last 24 hours: In: 56 [P O :460; IV Piggyback:150]  Out: 425 [Urine:425]      Physical Exam:   GENERAL: NAD  HEENT:  NC/AT, MMM, no scleral icterus  CARDIAC:  RRR, +S1/S2, no S3/S4 heard, no m/g/r  PULMONARY:  CTA B/L, no wheezing/rales/rhonci, non-labored breathing  ABDOMEN:  Soft, NT/ND, +BS, no rebound/guarding/rigidity  Extremities:  2+ Pulses in DP/PT  No edema, cyanosis, or clubbing  NEUROLOGIC:  Alert/oriented x3  SKIN:  No rashes or erythema      Invasive Devices     Peripheral Intravenous Line            Peripheral IV 20 Distal;Dorsal (posterior); Left Forearm 1 day    Peripheral IV 08/15/20 Left Antecubital less than 1 day                          Labs:   Recent Results (from the past 24 hour(s))   Fingerstick Glucose (POCT)    Collection Time: 08/15/20 11:18 AM   Result Value Ref Range    POC Glucose 183 (H) 65 - 140 mg/dl   Fingerstick Glucose (POCT)    Collection Time: 08/15/20  4:26 PM   Result Value Ref Range    POC Glucose 162 (H) 65 - 140 mg/dl   Fingerstick Glucose (POCT)    Collection Time: 08/15/20  8:46 PM   Result Value Ref Range    POC Glucose 268 (H) 65 - 140 mg/dl   Fingerstick Glucose (POCT)    Collection Time: 08/16/20  6:31 AM   Result Value Ref Range    POC Glucose 124 65 - 140 mg/dl       Radiology Results: I have personally reviewed pertinent reports  Other Diagnostic Testing:   I have personally reviewed pertinent reports          Active Meds:   Current Facility-Administered Medications   Medication Dose Route Frequency    aspirin (ECOTRIN LOW STRENGTH) EC tablet 81 mg  81 mg Oral Daily    enoxaparin (LOVENOX) subcutaneous injection 60 mg  60 mg Subcutaneous BID    furosemide (LASIX) tablet 40 mg  40 mg Oral Daily    melatonin tablet 3 mg  3 mg Oral HS    trimethobenzamide (TIGAN) IM injection 200 mg  200 mg Intramuscular Q6H PRN         VTE Pharmacologic Prophylaxis: Sequential compression device (Venodyne)   VTE Mechanical Prophylaxis: sequential compression device    Carmen Castillo MD

## 2020-08-16 NOTE — PROGRESS NOTES
Cardiology Progress Note - Irina Vera 62 y o  male MRN: [id number]    Unit/Bed#: [facility] 529-01 Encounter: [id number]    Assessment and Plan  1  Acute on chronic combined systolic and diastolic congestive heart failure  2  Probable cholecystitis status post HIDA scan  3  Nonischemic cardiomyopathy ejection fraction 15%  4  Pulmonary hypertension  5  Metabolic acidosis resolved  6  Transaminitis improved    Recommendations:  HIDA scan was unremarkable  Doppler of hepatic veins currently pending  I found 1 of the ambulance strips which does not show ventricular tachycardia  It shows sinus tachycardia  He had in his own personal possession  It was photographed and placed in the media tab  Continue daily Lasix, start Toprol XL 25 today  Watch blood pressure closely and up titrate afterload reducing agents  Subjective:    No significant events overnight  Complains of ongoing nausea had an episode of vomiting this morning  Shortness of breath has improved  He did make good urine with the Lasix today  Blood pressures have been stable  ROS    Objective:   Vitals: Blood pressure 126/86, pulse (!) 108, temperature 97 7 °F (36 5 °C), resp  rate 18, height 5' 9 5" (1 765 m), weight (!) 147 kg (323 lb 3 1 oz), SpO2 98 %  , Body mass index is 47 04 kg/m² ,   Orthostatic Blood Pressures      Most Recent Value   Blood Pressure  126/86 filed at 08/16/2020 1118   Patient Position - Orthostatic VS  Lying filed at 08/16/2020 5714         Systolic (69ZPT), DLZ:557 , Min:126 , DUO:377     Diastolic (17CTK), ZTX:33, Min:60, Max:89      Intake/Output Summary (Last 24 hours) at 8/16/2020 1228  Last data filed at 8/16/2020 1118  Gross per 24 hour   Intake 610 ml   Output 1045 ml   Net -435 ml     Weight (last 2 days)     Date/Time   Weight    08/15/20 0600   (!) 147 (323 19)    08/14/20 0546   (!) 146 (322 97)                Telemetry Review: No significant arrhythmias seen on telemetry review     EKG personally reviewed by Campos Schaffer DO  Physical Exam:  Vital signs reviewed  General:  Alert and cooperative, appears stated age, no acute distress  HEENT:  PERRLA, EOMI, no scleral icterus, no conjunctival pallor  Neck:  No lymphadenopathy, no thyromegaly, no carotid bruits, no elevated JVP  Heart:  Regular rate and rhythm, normal S1/S2, no S3/S4, no murmur, rubs or gallops  PMI nondisplaced  Lungs:  Clear to auscultation bilaterally, no wheezes rales or rhonchi  Abdomen:  Soft, non-tender, positive bowel sounds, no rebound or guarding,   no organomegaly   Extremities:  Normal range of motion    No clubbing, cyanosis or edema   Vascular:  2+ pedal pulses  Skin:  No rashes or lesions on exposed skin  Neurologic:  Cranial nerves II-XII grossly intact without focal deficits  Psych:  Normal mood and affect      Laboratory Results:  Results from last 7 days   Lab Units 08/13/20  0953 08/13/20  0358 08/12/20  2251   TROPONIN I ng/mL 0 10* 0 04 0 07*       CBC with diff:   Results from last 7 days   Lab Units 08/15/20  0450 08/14/20  0541 08/13/20  2349 08/13/20  0953 08/13/20  0939 08/12/20  2251   WBC Thousand/uL 9 12 11 77* 14 62* 21 30*  --  12 16*   HEMOGLOBIN g/dL 14 4 14 9 15 0 16 5  --  14 9   I STAT HEMOGLOBIN g/dl  --   --   --   --  17 3*  --    HEMATOCRIT % 44 2 47 4 46 2 52 7*  --  46 9   HEMATOCRIT, ISTAT %  --   --   --   --  51*  --    MCV fL 89 89 89 93  --  91   PLATELETS Thousands/uL 265 284 283 310  --  287   MCH pg 29 0 28 1 28 7 29 2  --  28 8   MCHC g/dL 32 6 31 4 32 5 31 3*  --  31 8   RDW % 15 5* 15 3* 15 7* 16 6*  --  15 0   MPV fL 9 8 10 1 10 0 10 0  --  9 6   NRBC AUTO /100 WBCs 0 0 0  --   --  0         CMP:  Results from last 7 days   Lab Units 08/15/20  0450 08/14/20  0541 08/13/20  1720 08/13/20  0953 08/13/20  0939 08/12/20  2251   POTASSIUM mmol/L 3 9 3 9 4 0 5 9*  --  3 9   CHLORIDE mmol/L 103 103 103 106  --  104   CO2 mmol/L 22 24 22 16*  --  22   CO2, I-STAT mmol/L  --   --   -- --  16*  --    BUN mg/dL 21 27* 25 23  --  21   CREATININE mg/dL 1 13 1 31* 1 64* 1 73*  --  1 55*   GLUCOSE, ISTAT mg/dl  --   --   --   --  80  --    CALCIUM mg/dL 8 0* 8 2* 8 7 8 4  --  8 3   AST U/L 972* 2,069*  --   --   --  40   ALT U/L 986* 1,278*  --   --   --  46   ALK PHOS U/L 95 94  --   --   --  72   EGFR ml/min/1 73sq m 84 70 53 50  --  57         BMP:  Results from last 7 days   Lab Units 08/15/20  0450 20  0541 20  1720 20  0953 20  0939 20  2251   POTASSIUM mmol/L 3 9 3 9 4 0 5 9*  --  3 9   CHLORIDE mmol/L 103 103 103 106  --  104   CO2 mmol/L 22 24 22 16*  --  22   CO2, I-STAT mmol/L  --   --   --   --  16*  --    BUN mg/dL 21 27* 25 23  --  21   CREATININE mg/dL 1 13 1 31* 1 64* 1 73*  --  1 55*   GLUCOSE, ISTAT mg/dl  --   --   --   --  80  --    CALCIUM mg/dL 8 0* 8 2* 8 7 8 4  --  8 3       BNP: No results for input(s): BNP in the last 72 hours  Magnesium:   Results from last 7 days   Lab Units 20  0953 20  2251   MAGNESIUM mg/dL 2 2 1 7       Coags:       TSH:        Hemoglobin A1C       Lipid Profile:       Cardiac testing:   Results for orders placed during the hospital encounter of 20   Echo complete with contrast if indicated    Narrative [address]  [phone]    Transthoracic Echocardiogram  2D, M-mode, Doppler, and Color Doppler    Study date:  14-Aug-2020    Patient: Luis Priest  MR number: [id number]  Account number: [de-identified]  : 1965  Age: 62 years  Gender: Male  Status: Inpatient  Location: Bedside  Height: 69 in  Weight: 322 1 lb  BP: 135/ 70 mmHg    Indications: Pulmonary hypertension      Diagnoses: I27 0 - Primary pulmonary hypertension    Sonographer:  DEV Parnell  Referring Physician:  Alison Bernstein DO  Group:  [facility]  Interpreting Physician:  Filipe Acharya DO    SUMMARY    LEFT VENTRICLE:  The ventricle was dilated  Systolic function was severely reduced  Ejection fraction was estimated in the range of 15 % to 20 %  There was severe diffuse hypokinesis with regional variations  Wall thickness was increased  Doppler parameters were consistent with high ventricular filling pressure  RIGHT VENTRICLE:  The ventricle was dilated  Systolic function was reduced  LEFT ATRIUM:  The atrium was moderately dilated  RIGHT ATRIUM:  The atrium was moderately dilated  MITRAL VALVE:  There was mild to moderate regurgitation  TRICUSPID VALVE:  There was moderate to severe regurgitation  Estimated peak PA pressure was 59 mmHg  IVC, HEPATIC VEINS:  The inferior vena cava was dilated, with poor inspiratory collapse, consistent with elevated right atrial pressure  PERICARDIUM:  A trace pericardial effusion was identified  HISTORY: PRIOR HISTORY: Congestive heart failure  Risk factors: hypertension, diabetes, morbid obesity, and a history of current cigarette use (within the last month)  PROCEDURE: The procedure was performed at the bedside  This was a routine study  The transthoracic approach was used  The study included complete 2D imaging, M-mode, complete spectral Doppler, and color Doppler  The heart rate was 98 bpm,  at the start of the study  Intravenous contrast (  4ml of Definity) was administered  Echocardiographic views were limited due to decreased penetration and lung interference  This was a technically difficult study  LEFT VENTRICLE: The ventricle was dilated  Systolic function was severely reduced  Ejection fraction was estimated in the range of 15 % to 20 %  There was severe diffuse hypokinesis with regional variations  Wall thickness was increased  DOPPLER: Left ventricular diastolic function parameters were abnormal  Doppler parameters were consistent with high ventricular filling pressure  RIGHT VENTRICLE: The ventricle was dilated   Systolic function was reduced  LEFT ATRIUM: The atrium was moderately dilated  RIGHT ATRIUM: The atrium was moderately dilated  MITRAL VALVE: Valve structure was normal  There was normal leaflet separation  DOPPLER: The transmitral velocity was within the normal range  There was no evidence for stenosis  There was mild to moderate regurgitation  AORTIC VALVE: The valve was trileaflet  Leaflets exhibited normal thickness and normal cuspal separation  DOPPLER: Transaortic velocity was within the normal range  There was no evidence for stenosis  There was no regurgitation  TRICUSPID VALVE: The valve structure was normal  There was normal leaflet separation  DOPPLER: The transtricuspid velocity was within the normal range  There was no evidence for stenosis  There was moderate to severe regurgitation  Estimated peak PA pressure was 59 mmHg  PULMONIC VALVE: Not well visualized  PERICARDIUM: A trace pericardial effusion was identified  AORTA: The root exhibited normal size  SYSTEMIC VEINS: IVC: The inferior vena cava was dilated, with poor inspiratory collapse, consistent with elevated right atrial pressure      SYSTEM MEASUREMENT TABLES    2D  %FS: 6 42 %  Ao Diam: 2 97 cm  EDV(Teich): 276 74 ml  EF(Teich): 13 89 %  ESV(Teich): 238 28 ml  IVSd: 0 88 cm  LA Diam: 5 2 cm  LAAs A2C: 31 47 cm2  LAAs A4C: 25 91 cm2  LAESV A-L A2C: 122 99 ml  LAESV A-L A4C: 87 16 ml  LAESV MOD A2C: 116 17 ml  LAESV MOD A4C: 80 62 ml  LAESV(A-L): 105 87 ml  LAESV(MOD BP): 98 9 ml  LALs A2C: 6 84 cm  LALs A4C: 6 54 cm  LVEDV MOD A4C: 219 42 ml  LVEF MOD A4C: 18 73 %  LVESV MOD A4C: 178 31 ml  LVIDd: 7 25 cm  LVIDs: 6 79 cm  LVLd A4C: 9 26 cm  LVLs A4C: 9 54 cm  LVPWd: 0 98 cm  RAAs: 28 13 cm2  RAESV A-L: 99 41 ml  RAESV MOD: 97 19 ml  RALs: 6 75 cm  RVIDd: 4 41 cm  SV MOD A4C: 41 11 ml  SV(Teich): 38 45 ml    CW  MR VTI: 131 46 cm  MR Vmax: 4 45 m/s  MR Vmean: 3 49 m/s  MR maxP 15 mmHg  MR meanP 35 mmHg  TR Vmax: 3 32 m/s  TR maxP 14 mmHg    MM  TAPSE: 1 37 cm    PW  MV A Sushil: 0 41 m/s  MV [place]: 10 64 m/s2  MV DecT: 103 83 ms  MV E Sushil: 1 06 m/s  MV E/A Ratio: 2 8  MV PHT: 30 11 ms  MVA By PHT: 7 31 cm2    [employer] Accredited Echocardiography Laboratory    Prepared and electronically signed by    Rafi Sullivan DO  Signed 15-Aug-2020 15:10:27       No results found for this or any previous visit  No results found for this or any previous visit  No results found for this or any previous visit  Meds/Allergies   all current active meds have been reviewed  Medications Prior to Admission   Medication    cyclobenzaprine (FLEXERIL) 10 mg tablet    diclofenac (VOLTAREN) 75 mg EC tablet    irbesartan-hydrochlorothiazide (AVALIDE) 300-12 5 MG per tablet    meloxicam (MOBIC) 15 mg tablet    methocarbamol (ROBAXIN) 750 mg tablet    pregabalin (LYRICA) 200 MG capsule          Assessment:  Principal Problem:    Acute cholecystitis  Active Problems:    Spondylolisthesis at L4-L5 level    Foraminal stenosis of lumbar region    Chronic obstructive lung disease (HCC)    Chronic pain    GERD (gastroesophageal reflux disease)    Morbid obesity (HCC)    Essential hypertension    Tobacco use disorder    Acute combined systolic and diastolic congestive heart failure (HCC)    Sustained VT (ventricular tachycardia) (HCC)    Type 2 MI (myocardial infarction) (HCC)    CKD (chronic kidney disease) stage 2, GFR 60-89 ml/min    Hypothyroidism (acquired)    Pre-diabetes    Hyperlipidemia    Vitamin D deficiency    Pericardial effusion    Transaminitis            Counseling / Coordination of Care  Total floor / unit time spent today 30 minutes  Greater than 50% of total time was spent with the patient and / or family counseling and / or coordination of care  A description of the counseling / coordination of care: ADRIAN pt

## 2020-08-17 PROBLEM — K81.0 ACUTE CHOLECYSTITIS: Status: RESOLVED | Noted: 2020-08-14 | Resolved: 2020-08-17

## 2020-08-17 LAB
ALBUMIN SERPL BCP-MCNC: 3.2 G/DL (ref 3.5–5)
ALP SERPL-CCNC: 103 U/L (ref 46–116)
ALT SERPL W P-5'-P-CCNC: 674 U/L (ref 12–78)
ANION GAP SERPL CALCULATED.3IONS-SCNC: 6 MMOL/L (ref 4–13)
AST SERPL W P-5'-P-CCNC: 250 U/L (ref 5–45)
BILIRUB SERPL-MCNC: 0.87 MG/DL (ref 0.2–1)
BUN SERPL-MCNC: 16 MG/DL (ref 5–25)
CALCIUM SERPL-MCNC: 8.6 MG/DL (ref 8.3–10.1)
CHLORIDE SERPL-SCNC: 107 MMOL/L (ref 100–108)
CO2 SERPL-SCNC: 23 MMOL/L (ref 21–32)
CREAT SERPL-MCNC: 1.17 MG/DL (ref 0.6–1.3)
ERYTHROCYTE [DISTWIDTH] IN BLOOD BY AUTOMATED COUNT: 15.9 % (ref 11.6–15.1)
FERRITIN SERPL-MCNC: 310 NG/ML (ref 8–388)
GFR SERPL CREATININE-BSD FRML MDRD: 80 ML/MIN/1.73SQ M
GLUCOSE SERPL-MCNC: 117 MG/DL (ref 65–140)
GLUCOSE SERPL-MCNC: 128 MG/DL (ref 65–140)
GLUCOSE SERPL-MCNC: 134 MG/DL (ref 65–140)
GLUCOSE SERPL-MCNC: 136 MG/DL (ref 65–140)
GLUCOSE SERPL-MCNC: 138 MG/DL (ref 65–140)
HCT VFR BLD AUTO: 46.7 % (ref 36.5–49.3)
HGB BLD-MCNC: 14.9 G/DL (ref 12–17)
IRON SATN MFR SERPL: 9 %
IRON SERPL-MCNC: 40 UG/DL (ref 65–175)
MCH RBC QN AUTO: 28.8 PG (ref 26.8–34.3)
MCHC RBC AUTO-ENTMCNC: 31.9 G/DL (ref 31.4–37.4)
MCV RBC AUTO: 90 FL (ref 82–98)
PLATELET # BLD AUTO: 259 THOUSANDS/UL (ref 149–390)
PMV BLD AUTO: 10.6 FL (ref 8.9–12.7)
POTASSIUM SERPL-SCNC: 4 MMOL/L (ref 3.5–5.3)
PROT SERPL-MCNC: 7.9 G/DL (ref 6.4–8.2)
RBC # BLD AUTO: 5.17 MILLION/UL (ref 3.88–5.62)
SODIUM SERPL-SCNC: 136 MMOL/L (ref 136–145)
TIBC SERPL-MCNC: 453 UG/DL (ref 250–450)
WBC # BLD AUTO: 9.43 THOUSAND/UL (ref 4.31–10.16)

## 2020-08-17 PROCEDURE — 84165 PROTEIN E-PHORESIS SERUM: CPT | Performed by: STUDENT IN AN ORGANIZED HEALTH CARE EDUCATION/TRAINING PROGRAM

## 2020-08-17 PROCEDURE — 83883 ASSAY NEPHELOMETRY NOT SPEC: CPT | Performed by: STUDENT IN AN ORGANIZED HEALTH CARE EDUCATION/TRAINING PROGRAM

## 2020-08-17 PROCEDURE — 82948 REAGENT STRIP/BLOOD GLUCOSE: CPT

## 2020-08-17 PROCEDURE — 83540 ASSAY OF IRON: CPT | Performed by: STUDENT IN AN ORGANIZED HEALTH CARE EDUCATION/TRAINING PROGRAM

## 2020-08-17 PROCEDURE — 87389 HIV-1 AG W/HIV-1&-2 AB AG IA: CPT | Performed by: STUDENT IN AN ORGANIZED HEALTH CARE EDUCATION/TRAINING PROGRAM

## 2020-08-17 PROCEDURE — 82728 ASSAY OF FERRITIN: CPT | Performed by: STUDENT IN AN ORGANIZED HEALTH CARE EDUCATION/TRAINING PROGRAM

## 2020-08-17 PROCEDURE — 86038 ANTINUCLEAR ANTIBODIES: CPT | Performed by: STUDENT IN AN ORGANIZED HEALTH CARE EDUCATION/TRAINING PROGRAM

## 2020-08-17 PROCEDURE — 84165 PROTEIN E-PHORESIS SERUM: CPT | Performed by: PATHOLOGY

## 2020-08-17 PROCEDURE — 85027 COMPLETE CBC AUTOMATED: CPT | Performed by: INTERNAL MEDICINE

## 2020-08-17 PROCEDURE — 83550 IRON BINDING TEST: CPT | Performed by: STUDENT IN AN ORGANIZED HEALTH CARE EDUCATION/TRAINING PROGRAM

## 2020-08-17 PROCEDURE — 99232 SBSQ HOSP IP/OBS MODERATE 35: CPT | Performed by: INTERNAL MEDICINE

## 2020-08-17 PROCEDURE — 80053 COMPREHEN METABOLIC PANEL: CPT | Performed by: INTERNAL MEDICINE

## 2020-08-17 RX ADMIN — SACUBITRIL AND VALSARTAN 1 TABLET: 24; 26 TABLET, FILM COATED ORAL at 11:28

## 2020-08-17 RX ADMIN — SACUBITRIL AND VALSARTAN 1 TABLET: 24; 26 TABLET, FILM COATED ORAL at 17:32

## 2020-08-17 RX ADMIN — ENOXAPARIN SODIUM 60 MG: 80 INJECTION SUBCUTANEOUS at 08:07

## 2020-08-17 RX ADMIN — METOPROLOL SUCCINATE 25 MG: 25 TABLET, EXTENDED RELEASE ORAL at 08:07

## 2020-08-17 RX ADMIN — ASPIRIN 81 MG: 81 TABLET, COATED ORAL at 08:07

## 2020-08-17 RX ADMIN — FAMOTIDINE 20 MG: 20 TABLET ORAL at 08:07

## 2020-08-17 RX ADMIN — FUROSEMIDE 40 MG: 40 TABLET ORAL at 08:07

## 2020-08-17 RX ADMIN — ENOXAPARIN SODIUM 40 MG: 40 INJECTION SUBCUTANEOUS at 21:57

## 2020-08-17 RX ADMIN — MELATONIN 3 MG: at 21:57

## 2020-08-17 NOTE — PROGRESS NOTES
INTERNAL MEDICINE RESIDENCY PROGRESS NOTE     Name: Marc Diggs   Age & Sex: 62 y o  male   MRN: [id number]  Unit/Bed#: [facility] 529-01   Encounter: [id number]  Team: SOD Team B     PATIENT INFORMATION     Name: Marc Diggs   Age & Sex: 62 y o  male   MRN: [id number]  Hospital Stay Days: 4    ASSESSMENT/PLAN     Principal Problem:    Acute combined systolic and diastolic congestive heart failure ([facility] )  Active Problems:    Transaminitis    Sustained VT (ventricular tachycardia) (HCC)    Type 2 MI (myocardial infarction) ([facility] )    Spondylolisthesis at L4-L5 level    Foraminal stenosis of lumbar region    Chronic obstructive lung disease (HCC)    Chronic pain    GERD (gastroesophageal reflux disease)    Morbid obesity ([place] [address] )    Essential hypertension    Tobacco use disorder    CKD (chronic kidney disease) stage 2, GFR 60-89 ml/min    Hypothyroidism (acquired)    Pre-diabetes    Hyperlipidemia    Vitamin D deficiency    Pericardial effusion      Acute cholecystitisresolved as of 8/17/2020  Assessment & Plan  · Findings of acute cholecystitis seen on CT abdomen   · Will empirically treat with ceftriaxone and flagyl as patient met SIRS Criteria  · HIDA scan negative     Transaminitis  Assessment & Plan  · Patient is being evaluated for abnormal liver chemistry  · The patient has acute Severe elevation of liver enzymes (>15x ULN), normal enzymes 2 days ago   · Jaundice present: no  · R- ratio suggests- Hepatocellular  · Signs of acute liver failure?- no  · Likely etiology includes: ischemic hepatitis given episode of hypotension after diuresis and improvement of liver enzymes significantly over the past few days   · USG with doppler negative   · Negative for acute viral hepatitis   · Discontinue hepatotoxic medications  · Avoid alcohol consumption   · Continue to trend enzymes     Acute respiratory failure with hypoxia (HCC)resolved as of 8/15/2020  Assessment & Plan  · Patient requiring 6LNC on presentation  · Likely etiology includes acute decompensated heart failure vs sepsis  · ABG was showing metabolic acidosis with respiratory compensation   · Patients source of sepsis would likely be cholecystitis  · Currently on empiric abx with ceftriaxone and flagyl  · Await blood cultures to return   · Currently seems to be doing much better from a breathing standpoint     * Acute combined systolic and diastolic congestive heart failure (HCC)  Assessment & Plan  Wt Readings from Last 3 Encounters:   08/12/20 (!) 154 kg (339 lb 1 1 oz)   06/29/20 (!) 143 kg (314 lb 2 5 oz)   10/28/19 (!) 153 kg (337 lb)     · Echo 06/26/2020-- EF of 20 25% and Grade 2 DD  · Mild to moderate pericardial effusion on CTA chest  · Received a total of 120mg iv lasix on 08/13  · Patient is on 40 mg of lasix oral daily     Sustained VT (ventricular tachycardia) (HCC)  Assessment & Plan  · Patient had 32 seconds run of V-tach per EMS EN route to [facility]  · Likely secondary to ischemic cardiomyopathy  · Recent echo 06/26/2020 showing an EF of 20-25% with diffuse hypokinesis  · Started on amiodarone at [facility], currently off drip  · Monitor electrolytes  · Cardiology following  · Monitor tele      Type 2 MI (myocardial infarction) ([facility] )  Assessment & Plan  · Troponin peaked at 0 07 and then downtrended  · No ischemic changes on EKG and patient had recent cardiac cath   · Likely in the setting of Acute decompensated heart failure     Vitamin D deficiency  Assessment & Plan  · Vitamin-D level around 13  · Recommended vitamin-D supplementation/ dietary changes/sun exposure  · Follow-up outpatient    Hyperlipidemia  Assessment & Plan  · Patient not taking any medication  · started high-intensity statin    Pre-diabetes  Assessment & Plan  · Hemoglobin A1c 6 2  · Educated on weight loss/dietary modification  · Not currently on any medication    Hypothyroidism (acquired)  Assessment & Plan  · Patient with elevated TSH >7  · Follow-up fT4  · Patient not taking any medication  · Follow-up outpatient PCP    CKD (chronic kidney disease) stage 2, GFR 60-89 ml/min  Assessment & Plan  · Creatinine 1 55 on presentation currently improved and back to baseline around 1 3-1 4  · Monitor BMP    Tobacco use disorder  Assessment & Plan  · Smoking cessation advised  · States he quit last month      Essential hypertension  Assessment & Plan  · Patient on home ARB-HCTZ 300-12 5 mg daily  · Will continue  · Monitor BMP, BP    Morbid obesity (HCC)  Assessment & Plan  · Advise Weight loss /bariatric surgery  · Follow-up palpation  · Educated on lifestyle modification      GERD (gastroesophageal reflux disease)  Assessment & Plan  · Not on home medications  · Follow up out pt pcp  · Avoid NSAIDs and triggering foods  · Weight loss advised      Disposition: Continue inpatient care, awaiting cardiology recommendations for discharge planning     SUBJECTIVE     Patient seen and examined  No acute events overnight  Patient denies chest pain, palpitations, SOB, cough, abdominal pain, nausea, vomiting, constipation, diarrhea, fevers/chills, headaches, dysuria  OBJECTIVE     Vitals:    20 0306 20 0535 20 0700 20 1123   BP: 141/90  119/85 142/98   BP Location: Right arm  Right arm Right arm   Pulse: (!) 108  (!) 106 (!) 111   Resp:    Temp: 97 9 °F (36 6 °C)  (!) 97 3 °F (36 3 °C)    TempSrc: Oral  Oral    SpO2: 99%  99%    Weight:  (!) 146 kg (322 lb 1 5 oz)     Height:          Temperature:   Temp (24hrs), Av 7 °F (36 5 °C), Min:97 3 °F (36 3 °C), Max:97 9 °F (36 6 °C)    Temperature: (!) 97 3 °F (36 3 °C)  Intake & Output:  I/O       08/15 07 -  0700  07 -  0700 701 -  0700    P  O  460 480     IV Piggyback 150      Total Intake(mL/kg) 610 (4 1) 480 (3 3)     Urine (mL/kg/hr) 425 (0 1) 1520 (0 4)     Total Output 425 1520     Net +185 -1040                Weights:   IBW: 71 85 kg    Body mass index is 46 88 kg/m²  Weight (last 2 days)     Date/Time   Weight    08/17/20 0535   (!) 146 (322 09)    08/15/20 0600   (!) 147 (323 19)            Physical Exam:   GENERAL: NAD, obese  HEENT:  NC/AT, MMM, no scleral icterus  CARDIAC:  RRR, +S1/S2, no S3/S4 heard, no m/g/r  PULMONARY: Tachypneic, decreased breath sounds at lung bases  ABDOMEN:  Soft, NT/ND, +BS, no rebound/guarding/rigidity  Extremities:  2+ Pulses in DP/PT  No edema, cyanosis, or clubbing  NEUROLOGIC:  Alert/oriented x3  SKIN:  No rashes or erythema    LABORATORY DATA     Labs: I have personally reviewed pertinent reports  Results from last 7 days   Lab Units 08/17/20  0522 08/15/20  0450 08/14/20  0541 08/13/20  2349   WBC Thousand/uL 9 43 9 12 11 77* 14 62*   HEMOGLOBIN g/dL 14 9 14 4 14 9 15 0   HEMATOCRIT % 46 7 44 2 47 4 46 2   PLATELETS Thousands/uL 259 265 284 283   NEUTROS PCT %  --  59 63 60   MONOS PCT %  --  11 10 11      Results from last 7 days   Lab Units 08/17/20  0522 08/15/20  0450 08/14/20  0541  08/13/20  0939   POTASSIUM mmol/L 4 0 3 9 3 9   < >  --    CHLORIDE mmol/L 107 103 103   < >  --    CO2 mmol/L 23 22 24   < >  --    CO2, I-STAT mmol/L  --   --   --   --  16*   BUN mg/dL 16 21 27*   < >  --    CREATININE mg/dL 1 17 1 13 1 31*   < >  --    CALCIUM mg/dL 8 6 8 0* 8 2*   < >  --    ALK PHOS U/L 103 95 94  --   --    ALT U/L 674* 986* 1,278*  --   --    AST U/L 250* 972* 2,069*  --   --    GLUCOSE, ISTAT mg/dl  --   --   --   --  80    < > = values in this interval not displayed       Results from last 7 days   Lab Units 08/13/20  0953 08/12/20  2251   MAGNESIUM mg/dL 2 2 1 7     Results from last 7 days   Lab Units 08/13/20  0953   PHOSPHORUS mg/dL 4 9*          Results from last 7 days   Lab Units 08/13/20  2349   LACTIC ACID mmol/L 1 7     Results from last 7 days   Lab Units 08/13/20  0953 08/13/20  0358 08/12/20  2251   TROPONIN I ng/mL 0 10* 0 04 0 07*       IMAGING & DIAGNOSTIC TESTING     Radiology Results: I have personally reviewed pertinent reports  Xr Chest Portable    Result Date: 8/13/2020  Impression: No acute cardiopulmonary disease  Workstation performed: [id number]     Nm Hepatobiliary    Result Date: 8/15/2020  Impression: There is visualization of the gallbladder indicating a patent cystic duct  Workstation performed: [id number]     Pe Study With Ct Abdomen And Pelvis With Contrast    Result Date: 8/13/2020  Impression: Extensive bilateral striated nephrograms concerning for pyelonephritis versus atypical infiltrating process  Recommend follow-up to resolution  Mild to moderate pericardial effusion  Gallbladder wall is thickened and enhancing concerning for cholecystitis  Workstation performed: [id number]     Us Right Upper Quadrant With Liver Dopplers    Result Date: 8/16/2020  Impression: 1  Mild hepatomegaly with diffuse increased hepatic echotexture consistent with steatosis and/or fibrosis  2   Slightly contracted gallbladder, likely related to the patient's non-NPO state  3   No evidence for cholecystitis, cholelithiasis, sonographic Hdz sign, pericholecystic fluid, choledocholithiasis or biliary obstruction  4   Normal portal venous and hepatic venous waveforms  Hepatic artery is not imaged, likely related to technical limitations of the study    Workstation performed: [id number]     Other Diagnostic Testing: I have personally reviewed pertinent reports      ACTIVE MEDICATIONS     Current Facility-Administered Medications   Medication Dose Route Frequency    aspirin (ECOTRIN LOW STRENGTH) EC tablet 81 mg  81 mg Oral Daily    enoxaparin (LOVENOX) subcutaneous injection 40 mg  40 mg Subcutaneous Q12H JEANCARLOS    famotidine (PEPCID) tablet 20 mg  20 mg Oral Daily    furosemide (LASIX) tablet 40 mg  40 mg Oral Daily    insulin lispro (HumaLOG) 100 units/mL subcutaneous injection 1-6 Units  1-6 Units Subcutaneous TID AC    melatonin tablet 3 mg  3 mg Oral HS    metoprolol succinate (TOPROL-XL) 24 hr tablet 25 mg  25 mg Oral Daily    trimethobenzamide (TIGAN) IM injection 200 mg  200 mg Intramuscular Q6H PRN       VTE Pharmacologic Prophylaxis: Enoxaparin (Lovenox)  VTE Mechanical Prophylaxis: sequential compression device    Portions of the record may have been created with voice recognition software  Occasional wrong word or "sound a like" substitutions may have occurred due to the inherent limitations of voice recognition software    Read the chart carefully and recognize, using context, where substitutions have occurred   ==  Deneen Moyer MD  [address]  Internal Medicine Residency PGY-3

## 2020-08-17 NOTE — PROGRESS NOTES
Progress Note - Cardiology   Arian Gram 62 y o  male MRN: [id number]  Unit/Bed#: [facility] 529-01 Encounter: [id number]    Assessment / Plan    62 y  o  male with PMHx of CAD s/p PCI 2008, HFrEF (<20% EF 7/20), EUNICE?, HTN, HLD, T2DM, GERD, CKD II, Tobacco use, recent multiple hospitalization for CHF exacerbation, who is a transfer from [facility] for cardiac evaluation        1) Acute Decompensated Heart Failure / Possible Sepsis           08/17/20          146 kg (322 lb        08/14/20          146 kg (322Lbs)   08/12/20 (!) 154 kg (339 lb 1 1 oz)   06/29/20 (!) 143 kg (314 lb 2 5 oz)   10/28/19 (!) 153 kg (337 lb)      -Multiple recent hospitalization for HF exacerbation  -Recent echo 08/14/2020 showing an EF of 15-20% with diffuse hypokinesis, moderate biatrial enlargement, SPAP 59, mild to mod MR  -Respiratory distress w/ increase work of breathing, O2 sat 96% on 5 L NC, w/ b/l crackles in lung field and JVD  -Rapid was called to evaluate the need for BiPAP and increase the level of care   -8/13 WBC of 20K, Abnormal ABG  PH 7 398, PCO2 24 5, HCO3 15 1  -Corona Negative  -UA w/ Protein, negative for bacteria, WBC, Nitrites  -8/14-Hepatic Function Panel- AST/ALT  2069/1278, Albumin 3 1, T-Bili 1 99- Direct 0 77  -8/17- Hepatitis Panel & NM Hepatobiliary-- Negative for acute pathology  Transaminitis Resolving-    -Urine Output of 5707-since admit  -GDMT-  -Started Toprol XL 25mg QD, Lasix 40mg QD, Entresto, will discuss to add Eplerenone and LifeVest at D/C   -Monitor Ins/Out  -Salt and fluid restriction        2) Sustained V-tach Episode     -Patient had a 32 seconds run of V-tach per EMS EN route to [address]  -Recent echo 06/26/2020 showing an EF of 20-25% with diffuse hypokinesis  -Started on amiodarone at [facility] 40   -EKG at [facility]- NSR, LAE, Prolonged QT  -8/13 Single Episode of Non Sustained V-Tach, resolved spontaneously  -8/17- 10 beats of V-Tach on tele overnight             Most Recent Potassium of 4 0  -Life Vest at D/C  -Monitor electrolytes  -Monitor telemetry     3) Elevated Troponins     -7/20 C from [facility] LAD 30%, 2nd Marginal 30% and Prox RCA 50% Stenosis  Recommended Medical Management  -presented to the ED w/ chest tightness  -Troponin elevation 0 07--> 04---> 10  -EKG- no ischemic changes, continue trend  -Troponemia likely due to acute decompensated HF         4) Coronary Artery Disease  -PCI 2008  --7/20 LHC from [facility] LAD 30%, 2nd Marginal 30% and Prox RCA 50% Stenosis  Recommended Medical Management  -ASA 81mg  -Dc'd Lipitor in setting of Transaminitis      5) Essential Hypertension     -Presented to ED w/ Elevated blood pressure  -BP range 161-93/  -On valsartan at home  -Holding Off in setting of LUCY and Lasix use (currently holding off)  -avoid b-blockers in setting of acute decompensated HF  -can use vasodilators and diuretics      Subjective/Objective   Chief Complaint: Respiratory Distress    Subjective: Pt is feeling great  Endorses mild non-productive cough  Denies any chest pain, difficulty breathing, palpitations, dizziness, n/v, fever or chills    Objective:       Vitals: /85 (BP Location: Right arm)   Pulse (!) 106   Temp (!) 97 3 °F (36 3 °C) (Oral)   Resp 18   Ht 5' 9 5" (1 765 m)   Wt (!) 146 kg (322 lb 1 5 oz)   SpO2 99%   BMI 46 88 kg/m²   Vitals:    08/15/20 0600 08/17/20 0535   Weight: (!) 147 kg (323 lb 3 1 oz) (!) 146 kg (322 lb 1 5 oz)     Orthostatic Blood Pressures      Most Recent Value   Blood Pressure  119/85 filed at 08/17/2020 0700   Patient Position - Orthostatic VS  Sitting filed at 08/17/2020 0700            Intake/Output Summary (Last 24 hours) at 8/17/2020 0856  Last data filed at 8/17/2020 0305  Gross per 24 hour   Intake 480 ml   Output 1520 ml   Net -1040 ml       Invasive Devices     Peripheral Intravenous Line            Peripheral IV 08/14/20 Distal;Dorsal (posterior); Left Forearm 2 days Peripheral IV 08/15/20 Left Antecubital 1 day                Review of Systems: General ROS: negative  Respiratory ROS: no cough, shortness of breath, or wheezing  Cardiovascular ROS: no chest pain or dyspnea on exertion  Musculoskeletal ROS: negative    Physical Exam: /85 (BP Location: Right arm)   Pulse (!) 106   Temp (!) 97 3 °F (36 3 °C) (Oral)   Resp 18   Ht 5' 9 5" (1 765 m)   Wt (!) 146 kg (322 lb 1 5 oz)   SpO2 99%   BMI 46 88 kg/m²   General appearance: alert and oriented, in no acute distress  Lungs: clear to auscultation bilaterally  Extremities: extremities normal, warm and well-perfused; no cyanosis, clubbing, or edema  Pulses: 2+ and symmetric    Lab Results:   CBC with diff:   Results from last 7 days   Lab Units 08/17/20  0522   WBC Thousand/uL 9 43   RBC Million/uL 5 17   HEMOGLOBIN g/dL 14 9   HEMATOCRIT % 46 7   MCV fL 90   MCH pg 28 8   MCHC g/dL 31 9   RDW % 15 9*   MPV fL 10 6   PLATELETS Thousands/uL 259     CMP:   Results from last 7 days   Lab Units 08/17/20 0522  08/13/20  0939   SODIUM mmol/L 136   < >  --    POTASSIUM mmol/L 4 0   < >  --    CHLORIDE mmol/L 107   < >  --    CO2 mmol/L 23   < >  --    CO2, I-STAT mmol/L  --   --  16*   BUN mg/dL 16   < >  --    CREATININE mg/dL 1 17   < >  --    GLUCOSE, ISTAT mg/dl  --   --  80   CALCIUM mg/dL 8 6   < >  --    AST U/L 250*   < >  --    ALT U/L 674*   < >  --    ALK PHOS U/L 103   < >  --    EGFR ml/min/1 73sq m 80   < >  --     < > = values in this interval not displayed       Troponin:   0   Lab Value Date/Time    TROPONINI 0 10 (H) 08/13/2020 0953    TROPONINI 0 04 08/13/2020 0358    TROPONINI 0 07 (H) 08/12/2020 2251    TROPONINI 0 09 (H) 06/26/2020 1434    TROPONINI 0 09 (H) 06/26/2020 0816     BNP:   Results from last 7 days   Lab Units 08/17/20  0522 08/13/20  0939   POTASSIUM mmol/L 4 0   < >  --    CHLORIDE mmol/L 107   < >  --    CO2 mmol/L 23   < >  --    CO2, I-STAT mmol/L  --   --  16*   BUN mg/dL 16   < >  -- CREATININE mg/dL 1 17   < >  --    GLUCOSE, ISTAT mg/dl  --   --  80   CALCIUM mg/dL 8 6   < >  --    EGFR ml/min/1 73sq m 80   < >  --     < > = values in this interval not displayed  Coags:     TSH:   Results from last 7 days   Lab Units 08/12/20  2251   TSH 3RD GENERATON uIU/mL 7 884*     Magnesium:   Results from last 7 days   Lab Units 08/13/20  0953   MAGNESIUM mg/dL 2 2     Lipid Profile:     Imaging: I have personally reviewed pertinent films in PACS and I have personally reviewed pertinent films in PACS with a Radiologist   EKG:   VTE Pharmacologic Prophylaxis: Enoxaparin (Lovenox)  VTE Mechanical Prophylaxis: reason for no mechanical VTE prophylaxis Lovenox    Counseling / Coordination of Care  Total time spent today 30 minutes  Greater than 50% of total time was spent with the patient and / or family counseling and / or coordination of care  A description of the counseling / coordination of care: Karrie Nissen

## 2020-08-17 NOTE — QUICK NOTE
Pt had a 10 beat run of v tach while sleeping  Asymptomatic  Returned to sinus rhythm with HR low 100s and BP 99/75

## 2020-08-18 ENCOUNTER — APPOINTMENT (INPATIENT)
Dept: RADIOLOGY | Facility: HOSPITAL | Age: 55
DRG: 291 | End: 2020-08-18
Payer: MEDICARE

## 2020-08-18 PROBLEM — R77.8 ELEVATED TROPONIN LEVEL NOT DUE MYOCARDIAL INFARCTION: Status: ACTIVE | Noted: 2020-08-13

## 2020-08-18 LAB
ALBUMIN SERPL BCP-MCNC: 2.9 G/DL (ref 3.5–5)
ALBUMIN SERPL ELPH-MCNC: 3.84 G/DL (ref 3.5–5)
ALBUMIN SERPL ELPH-MCNC: 49.9 % (ref 52–65)
ALP SERPL-CCNC: 91 U/L (ref 46–116)
ALPHA1 GLOB SERPL ELPH-MCNC: 0.38 G/DL (ref 0.1–0.4)
ALPHA1 GLOB SERPL ELPH-MCNC: 4.9 % (ref 2.5–5)
ALPHA2 GLOB SERPL ELPH-MCNC: 0.86 G/DL (ref 0.4–1.2)
ALPHA2 GLOB SERPL ELPH-MCNC: 11.2 % (ref 7–13)
ALT SERPL W P-5'-P-CCNC: 452 U/L (ref 12–78)
ANION GAP SERPL CALCULATED.3IONS-SCNC: 7 MMOL/L (ref 4–13)
AST SERPL W P-5'-P-CCNC: 107 U/L (ref 5–45)
BACTERIA BLD CULT: NORMAL
BASOPHILS # BLD AUTO: 0.05 THOUSANDS/ΜL (ref 0–0.1)
BASOPHILS NFR BLD AUTO: 1 % (ref 0–1)
BETA GLOB ABNORMAL SERPL ELPH-MCNC: 0.51 G/DL (ref 0.4–0.8)
BETA1 GLOB SERPL ELPH-MCNC: 6.6 % (ref 5–13)
BETA2 GLOB SERPL ELPH-MCNC: 7.8 % (ref 2–8)
BETA2+GAMMA GLOB SERPL ELPH-MCNC: 0.6 G/DL (ref 0.2–0.5)
BILIRUB DIRECT SERPL-MCNC: 0.34 MG/DL (ref 0–0.2)
BILIRUB SERPL-MCNC: 0.88 MG/DL (ref 0.2–1)
BUN SERPL-MCNC: 16 MG/DL (ref 5–25)
CALCIUM SERPL-MCNC: 8.4 MG/DL (ref 8.3–10.1)
CHLORIDE SERPL-SCNC: 107 MMOL/L (ref 100–108)
CO2 SERPL-SCNC: 23 MMOL/L (ref 21–32)
CREAT SERPL-MCNC: 1.02 MG/DL (ref 0.6–1.3)
EOSINOPHIL # BLD AUTO: 0.31 THOUSAND/ΜL (ref 0–0.61)
EOSINOPHIL NFR BLD AUTO: 3 % (ref 0–6)
ERYTHROCYTE [DISTWIDTH] IN BLOOD BY AUTOMATED COUNT: 15.7 % (ref 11.6–15.1)
GAMMA GLOB ABNORMAL SERPL ELPH-MCNC: 1.51 G/DL (ref 0.5–1.6)
GAMMA GLOB SERPL ELPH-MCNC: 19.6 % (ref 12–22)
GFR SERPL CREATININE-BSD FRML MDRD: 95 ML/MIN/1.73SQ M
GLUCOSE SERPL-MCNC: 106 MG/DL (ref 65–140)
GLUCOSE SERPL-MCNC: 110 MG/DL (ref 65–140)
GLUCOSE SERPL-MCNC: 116 MG/DL (ref 65–140)
GLUCOSE SERPL-MCNC: 137 MG/DL (ref 65–140)
GLUCOSE SERPL-MCNC: 86 MG/DL (ref 65–140)
HCT VFR BLD AUTO: 47.8 % (ref 36.5–49.3)
HGB BLD-MCNC: 15.3 G/DL (ref 12–17)
HIV 1+2 AB+HIV1 P24 AG SERPL QL IA: NORMAL
IGG/ALB SER: 1 {RATIO} (ref 1.1–1.8)
IMM GRANULOCYTES # BLD AUTO: 0.05 THOUSAND/UL (ref 0–0.2)
IMM GRANULOCYTES NFR BLD AUTO: 1 % (ref 0–2)
LYMPHOCYTES # BLD AUTO: 3.15 THOUSANDS/ΜL (ref 0.6–4.47)
LYMPHOCYTES NFR BLD AUTO: 32 % (ref 14–44)
MCH RBC QN AUTO: 28.7 PG (ref 26.8–34.3)
MCHC RBC AUTO-ENTMCNC: 32 G/DL (ref 31.4–37.4)
MCV RBC AUTO: 90 FL (ref 82–98)
MONOCYTES # BLD AUTO: 0.98 THOUSAND/ΜL (ref 0.17–1.22)
MONOCYTES NFR BLD AUTO: 10 % (ref 4–12)
NEUTROPHILS # BLD AUTO: 5.34 THOUSANDS/ΜL (ref 1.85–7.62)
NEUTS SEG NFR BLD AUTO: 53 % (ref 43–75)
NRBC BLD AUTO-RTO: 0 /100 WBCS
PLATELET # BLD AUTO: 259 THOUSANDS/UL (ref 149–390)
PMV BLD AUTO: 10.1 FL (ref 8.9–12.7)
POTASSIUM SERPL-SCNC: 4.2 MMOL/L (ref 3.5–5.3)
PROT PATTERN SERPL ELPH-IMP: ABNORMAL
PROT SERPL-MCNC: 7.3 G/DL (ref 6.4–8.2)
PROT SERPL-MCNC: 7.7 G/DL (ref 6.4–8.2)
RBC # BLD AUTO: 5.34 MILLION/UL (ref 3.88–5.62)
SODIUM SERPL-SCNC: 137 MMOL/L (ref 136–145)
WBC # BLD AUTO: 9.88 THOUSAND/UL (ref 4.31–10.16)

## 2020-08-18 PROCEDURE — 85025 COMPLETE CBC W/AUTO DIFF WBC: CPT | Performed by: INTERNAL MEDICINE

## 2020-08-18 PROCEDURE — 80048 BASIC METABOLIC PNL TOTAL CA: CPT | Performed by: INTERNAL MEDICINE

## 2020-08-18 PROCEDURE — 80076 HEPATIC FUNCTION PANEL: CPT | Performed by: INTERNAL MEDICINE

## 2020-08-18 PROCEDURE — A9585 GADOBUTROL INJECTION: HCPCS | Performed by: INTERNAL MEDICINE

## 2020-08-18 PROCEDURE — 84166 PROTEIN E-PHORESIS/URINE/CSF: CPT | Performed by: PATHOLOGY

## 2020-08-18 PROCEDURE — 84166 PROTEIN E-PHORESIS/URINE/CSF: CPT | Performed by: STUDENT IN AN ORGANIZED HEALTH CARE EDUCATION/TRAINING PROGRAM

## 2020-08-18 PROCEDURE — 99232 SBSQ HOSP IP/OBS MODERATE 35: CPT | Performed by: INTERNAL MEDICINE

## 2020-08-18 PROCEDURE — 82948 REAGENT STRIP/BLOOD GLUCOSE: CPT

## 2020-08-18 PROCEDURE — 75561 CARDIAC MRI FOR MORPH W/DYE: CPT

## 2020-08-18 RX ORDER — LORAZEPAM 0.5 MG/1
0.5 TABLET ORAL ONCE
Status: DISCONTINUED | OUTPATIENT
Start: 2020-08-18 | End: 2020-08-21

## 2020-08-18 RX ORDER — BUMETANIDE 1 MG/1
1 TABLET ORAL DAILY
Status: DISCONTINUED | OUTPATIENT
Start: 2020-08-19 | End: 2020-08-19

## 2020-08-18 RX ORDER — LORAZEPAM 0.5 MG/1
0.5 TABLET ORAL ONCE
Status: COMPLETED | OUTPATIENT
Start: 2020-08-18 | End: 2020-08-18

## 2020-08-18 RX ADMIN — ENOXAPARIN SODIUM 40 MG: 40 INJECTION SUBCUTANEOUS at 08:08

## 2020-08-18 RX ADMIN — ASPIRIN 81 MG: 81 TABLET, COATED ORAL at 08:07

## 2020-08-18 RX ADMIN — METOPROLOL SUCCINATE 25 MG: 25 TABLET, EXTENDED RELEASE ORAL at 08:07

## 2020-08-18 RX ADMIN — FUROSEMIDE 40 MG: 40 TABLET ORAL at 08:08

## 2020-08-18 RX ADMIN — GADOBUTROL 24 ML: 604.72 INJECTION INTRAVENOUS at 23:51

## 2020-08-18 RX ADMIN — ENOXAPARIN SODIUM 40 MG: 40 INJECTION SUBCUTANEOUS at 21:46

## 2020-08-18 RX ADMIN — LORAZEPAM 0.5 MG: 0.5 TABLET ORAL at 21:45

## 2020-08-18 RX ADMIN — MELATONIN 3 MG: at 21:45

## 2020-08-18 RX ADMIN — SACUBITRIL AND VALSARTAN 1 TABLET: 24; 26 TABLET, FILM COATED ORAL at 18:16

## 2020-08-18 RX ADMIN — SACUBITRIL AND VALSARTAN 1 TABLET: 24; 26 TABLET, FILM COATED ORAL at 08:08

## 2020-08-18 RX ADMIN — DICLOFENAC SODIUM 2 G: 10 GEL TOPICAL at 21:47

## 2020-08-18 RX ADMIN — FAMOTIDINE 20 MG: 20 TABLET ORAL at 08:07

## 2020-08-18 NOTE — PROGRESS NOTES
INTERNAL MEDICINE RESIDENCY PROGRESS NOTE     Name: Ema Bhatia   Age & Sex: 62 y o  male   MRN: [id number]  Unit/Bed#: [facility] 529-01   Encounter: [id number]  Team: SOD Team B     PATIENT INFORMATION     Name: Ema Bhatia   Age & Sex: 62 y o  male   MRN: [id number]  Hospital Stay Days: 5    ASSESSMENT/PLAN     Principal Problem:    Acute combined systolic and diastolic congestive heart failure ([facility] [address] )  Active Problems:    Transaminitis    Sustained VT (ventricular tachycardia) (HCC)    Elevated troponin level not due myocardial infarction    Spondylolisthesis at L4-L5 level    Foraminal stenosis of lumbar region    Chronic obstructive lung disease (HCC)    Chronic pain    GERD (gastroesophageal reflux disease)    Morbid obesity ([facility] [address] )    Essential hypertension    Tobacco use disorder    CKD (chronic kidney disease) stage 2, GFR 60-89 ml/min    Hypothyroidism (acquired)    Pre-diabetes    Hyperlipidemia    Vitamin D deficiency    Pericardial effusion      Acute cholecystitisresolved as of 8/17/2020  Assessment & Plan  · Findings of acute cholecystitis seen on CT abdomen   · Will empirically treat with ceftriaxone and flagyl as patient met SIRS Criteria  · HIDA scan negative     Transaminitis  Assessment & Plan  · Patient is being evaluated for abnormal liver chemistry  · The patient had acute Severe elevation of liver enzymes (>15x ULN), normal enzymes 2 days prior to elevation, currently moderate elevation of liver enzymes   · Jaundice present: no  · R- ratio suggests- Hepatocellular  · Signs of acute liver failure?- no  · Likely etiology includes: ischemic hepatitis given episode of hypotension after diuresis and improvement of liver enzymes significantly over the past few days   · USG with doppler negative   · Negative for acute viral hepatitis   · Discontinue hepatotoxic medications  · Avoid alcohol consumption   · Continue to trend enzymes     Acute respiratory failure with hypoxia (HCC)resolved as of 8/15/2020  Assessment & Plan  · Patient requiring 6LNC on presentation  · Likely etiology includes acute decompensated heart failure vs sepsis  · ABG was showing metabolic acidosis with respiratory compensation   · Patients source of sepsis would likely be cholecystitis  · Currently on empiric abx with ceftriaxone and flagyl  · Await blood cultures to return   · Currently seems to be doing much better from a breathing standpoint     * Acute combined systolic and diastolic congestive heart failure (HCC)  Assessment & Plan  Wt Readings from Last 3 Encounters:   08/12/20 (!) 154 kg (339 lb 1 1 oz)   06/29/20 (!) 143 kg (314 lb 2 5 oz)   10/28/19 (!) 153 kg (337 lb)     · Echo 06/26/2020-- EF of 20 25% and Grade 2 DD  · Mild to moderate pericardial effusion on CTA chest  · Received a total of 120mg iv lasix on 08/13  · Patient is on 40 mg of lasix oral daily   · Making good urine  · Needs cardiac MRI to evaluate cardiomyopathy  · Also needs Lifevest prior to discharge     Sustained VT (ventricular tachycardia) ([facility])  Assessment & Plan  · Patient had 32 seconds run of V-tach per EMS EN route to [facility]  · Likely secondary to ischemic cardiomyopathy  · Recent echo 06/26/2020 showing an EF of 20-25% with diffuse hypokinesis  · Started on amiodarone at [facility], currently off drip  · Monitor electrolytes  · Cardiology following  · Monitor tele  · Needs Lifevest prior to discharge       Elevated troponin level not due myocardial infarction  Assessment & Plan  · Troponin peaked at 0 07 and then downtrended  · No ischemic changes on EKG and patient had recent cardiac cath   · Likely in the setting of Acute decompensated heart failure     Vitamin D deficiency  Assessment & Plan  · Vitamin-D level around 13  · Recommended vitamin-D supplementation/ dietary changes/sun exposure  · Follow-up outpatient    Hyperlipidemia  Assessment & Plan  · Patient not taking any medication  · started high-intensity statin    Pre-diabetes  Assessment & Plan  · Hemoglobin A1c 6 2  · Educated on weight loss/dietary modification  · Not currently on any medication    Hypothyroidism (acquired)  Assessment & Plan  · Patient with elevated TSH >7  · Follow-up fT4  · Patient not taking any medication  · Follow-up outpatient PCP    CKD (chronic kidney disease) stage 2, GFR 60-89 ml/min  Assessment & Plan  · Creatinine 1 55 on presentation currently improved and back to baseline around 1 3-1 4  · Monitor BMP    Tobacco use disorder  Assessment & Plan  · Smoking cessation advised  · States he quit last month      Essential hypertension  Assessment & Plan  · Patient on home ARB-HCTZ 300-12 5 mg daily  · Will continue  · Monitor BMP, BP    Morbid obesity (HCC)  Assessment & Plan  · Advise Weight loss /bariatric surgery  · Follow-up palpation  · Educated on lifestyle modification      GERD (gastroesophageal reflux disease)  Assessment & Plan  · Not on home medications  · Follow up out pt pcp  · Avoid NSAIDs and triggering foods  · Weight loss advised        Disposition: Pending cardiac MRI      SUBJECTIVE     Patient seen and examined  No acute events overnight  Patient has no complaints currently  OBJECTIVE     Vitals:    20 2310 20 0600 20 0700 20 1136   BP: 123/65  118/77 108/67   BP Location:   Right arm    Pulse: 102  95 (!) 109   Resp: 18  18 20   Temp: 98 1 °F (36 7 °C)  97 9 °F (36 6 °C) 97 6 °F (36 4 °C)   TempSrc:   Oral    SpO2: 95%  97% 97%   Weight:  (!) 144 kg (317 lb 14 5 oz)     Height:          Temperature:   Temp (24hrs), Av 8 °F (36 6 °C), Min:97 6 °F (36 4 °C), Max:98 1 °F (36 7 °C)    Temperature: 97 6 °F (36 4 °C)  Intake & Output:  I/O        07 -  0700  07 -  07 -  0700    P  O  480 840     IV Piggyback       Total Intake(mL/kg) 480 (3 3) 840 (5 8)     Urine (mL/kg/hr) 1520 (0 4) 1225 (0 4) 580 (0 8)    Total Output 1520 1225 580    Net -1041 -950 -580           Unmeasured Urine Occurrence  2 x         Weights:   IBW: 71 85 kg    Body mass index is 46 95 kg/m²  Weight (last 2 days)     Date/Time   Weight    08/18/20 0600   (!) 144 (317 9)    08/17/20 0535   (!) 146 (322 09)            Physical Exam:   GENERAL: NAD, morbidly obese   HEENT:  NC/AT, MMM, no scleral icterus  CARDIAC:  RRR, +S1/S2, no S3/S4 heard, no m/g/r  PULMONARY:  CTA B/L, no wheezing/rales/rhonci, non-labored breathing  ABDOMEN:  Soft, NT/ND, +BS, no rebound/guarding/rigidity  Extremities:  2+ Pulses in DP/PT  No edema, cyanosis, or clubbing  NEUROLOGIC:  Alert/oriented x3  SKIN:  No rashes or erythema    LABORATORY DATA     Labs: I have personally reviewed pertinent reports  Results from last 7 days   Lab Units 08/18/20  0507 08/17/20  0522 08/15/20  0450 08/14/20  0541   WBC Thousand/uL 9 88 9 43 9 12 11 77*   HEMOGLOBIN g/dL 15 3 14 9 14 4 14 9   HEMATOCRIT % 47 8 46 7 44 2 47 4   PLATELETS Thousands/uL 259 259 265 284   NEUTROS PCT % 53  --  59 63   MONOS PCT % 10  --  11 10      Results from last 7 days   Lab Units 08/18/20  0507 08/17/20  0522 08/15/20  0450  08/13/20  0939   POTASSIUM mmol/L 4 2 4 0 3 9   < >  --    CHLORIDE mmol/L 107 107 103   < >  --    CO2 mmol/L 23 23 22   < >  --    CO2, I-STAT mmol/L  --   --   --   --  16*   BUN mg/dL 16 16 21   < >  --    CREATININE mg/dL 1 02 1 17 1 13   < >  --    CALCIUM mg/dL 8 4 8 6 8 0*   < >  --    ALK PHOS U/L 91 103 95   < >  --    ALT U/L 452* 674* 986*   < >  --    AST U/L 107* 250* 972*   < >  --    GLUCOSE, ISTAT mg/dl  --   --   --   --  80    < > = values in this interval not displayed       Results from last 7 days   Lab Units 08/13/20  0953 08/12/20  2251   MAGNESIUM mg/dL 2 2 1 7     Results from last 7 days   Lab Units 08/13/20  0953   PHOSPHORUS mg/dL 4 9*          Results from last 7 days   Lab Units 08/13/20  2349   LACTIC ACID mmol/L 1 7     Results from last 7 days   Lab Units 08/13/20  0953 08/13/20  0358 08/12/20  2251   TROPONIN I ng/mL 0 10* 0 04 0 07*       IMAGING & DIAGNOSTIC TESTING     Radiology Results: I have personally reviewed pertinent reports  Xr Chest Portable    Result Date: 8/13/2020  Impression: No acute cardiopulmonary disease  Workstation performed: [id number]     Nm Hepatobiliary    Result Date: 8/15/2020  Impression: There is visualization of the gallbladder indicating a patent cystic duct  Workstation performed: [id number]     Pe Study With Ct Abdomen And Pelvis With Contrast    Result Date: 8/13/2020  Impression: Extensive bilateral striated nephrograms concerning for pyelonephritis versus atypical infiltrating process  Recommend follow-up to resolution  Mild to moderate pericardial effusion  Gallbladder wall is thickened and enhancing concerning for cholecystitis  Workstation performed: [id number]     Us Right Upper Quadrant With Liver Dopplers    Result Date: 8/16/2020  Impression: 1  Mild hepatomegaly with diffuse increased hepatic echotexture consistent with steatosis and/or fibrosis  2   Slightly contracted gallbladder, likely related to the patient's non-NPO state  3   No evidence for cholecystitis, cholelithiasis, sonographic Hdz sign, pericholecystic fluid, choledocholithiasis or biliary obstruction  4   Normal portal venous and hepatic venous waveforms  Hepatic artery is not imaged, likely related to technical limitations of the study    Workstation performed: [id number]     Other Diagnostic Testing: I have personally reviewed pertinent reports      ACTIVE MEDICATIONS     Current Facility-Administered Medications   Medication Dose Route Frequency    aspirin (ECOTRIN LOW STRENGTH) EC tablet 81 mg  81 mg Oral Daily    [START ON 8/19/2020] bumetanide (BUMEX) tablet 1 mg  1 mg Oral Daily    enoxaparin (LOVENOX) subcutaneous injection 40 mg  40 mg Subcutaneous Q12H JEANCARLOS    famotidine (PEPCID) tablet 20 mg  20 mg Oral Daily    insulin lispro (HumaLOG) 100 units/mL subcutaneous injection 1-6 Units  1-6 Units Subcutaneous TID AC    LORazepam (ATIVAN) tablet 0 5 mg  0 5 mg Oral Once    melatonin tablet 3 mg  3 mg Oral HS    metoprolol succinate (TOPROL-XL) 24 hr tablet 25 mg  25 mg Oral Daily    sacubitril-valsartan (ENTRESTO) 24-26 MG per tablet 1 tablet  1 tablet Oral BID    trimethobenzamide (TIGAN) IM injection 200 mg  200 mg Intramuscular Q6H PRN       VTE Pharmacologic Prophylaxis: Enoxaparin (Lovenox)  VTE Mechanical Prophylaxis: sequential compression device    Portions of the record may have been created with voice recognition software  Occasional wrong word or "sound a like" substitutions may have occurred due to the inherent limitations of voice recognition software    Read the chart carefully and recognize, using context, where substitutions have occurred   ==  Sujey Grider MD  [facility]  Internal Medicine Residency PGY-3

## 2020-08-18 NOTE — QUICK NOTE
Called the Ambulance company to see if there is a rhythm strip for the V-tach episode enroute to [facility] on 12th  Unfortunately, there is no record of it

## 2020-08-18 NOTE — PLAN OF CARE
Problem: Potential for Falls  Goal: Patient will remain free of falls  Description: INTERVENTIONS:  - Assess patient frequently for physical needs  -  Identify cognitive and physical deficits and behaviors that affect risk of falls    -  [place] fall precautions as indicated by assessment   - Educate patient/family on patient safety including physical limitations  - Instruct patient to call for assistance with activity based on assessment  - Modify environment to reduce risk of injury  - Consider OT/PT consult to assist with strengthening/mobility  Outcome: Progressing     Problem: CARDIOVASCULAR - ADULT  Goal: Maintains optimal cardiac output and hemodynamic stability  Description: INTERVENTIONS:  - Monitor I/O, vital signs and rhythm  - Monitor for S/S and trends of decreased cardiac output  - Administer and titrate ordered vasoactive medications to optimize hemodynamic stability  - Assess quality of pulses, skin color and temperature  - Assess for signs of decreased coronary artery perfusion  - Instruct patient to report change in severity of symptoms  Outcome: Progressing  Goal: Absence of cardiac dysrhythmias or at baseline rhythm  Description: INTERVENTIONS:  - Continuous cardiac monitoring, vital signs, obtain 12 lead EKG if ordered  - Administer antiarrhythmic and heart rate control medications as ordered  - Monitor electrolytes and administer replacement therapy as ordered  Outcome: Progressing     Problem: PAIN - ADULT  Goal: Verbalizes/displays adequate comfort level or baseline comfort level  Description: Interventions:  - Encourage patient to monitor pain and request assistance  - Assess pain using appropriate pain scale  - Administer analgesics based on type and severity of pain and evaluate response  - Implement non-pharmacological measures as appropriate and evaluate response  - Consider cultural and social influences on pain and pain management  - Notify physician/advanced practitioner if interventions unsuccessful or patient reports new pain  Outcome: Progressing     Problem: INFECTION - ADULT  Goal: Absence or prevention of progression during hospitalization  Description: INTERVENTIONS:  - Assess and monitor for signs and symptoms of infection  - Monitor lab/diagnostic results  - Monitor all insertion sites, i e  indwelling lines, tubes, and drains  - [place] appropriate cooling/warming therapies per order  - Administer medications as ordered  - Instruct and encourage patient and family to use good hand hygiene technique  - Identify and instruct in appropriate isolation precautions for identified infection/condition  Outcome: Progressing  Goal: Absence of fever/infection during neutropenic period  Description: INTERVENTIONS:  - Monitor WBC    Outcome: Progressing     Problem: SAFETY ADULT  Goal: Patient will remain free of falls  Description: INTERVENTIONS:  - Assess patient frequently for physical needs  -  Identify cognitive and physical deficits and behaviors that affect risk of falls    -  [place] fall precautions as indicated by assessment   - Educate patient/family on patient safety including physical limitations  - Instruct patient to call for assistance with activity based on assessment  - Modify environment to reduce risk of injury  - Consider OT/PT consult to assist with strengthening/mobility  Outcome: Progressing  Goal: Maintain or return to baseline ADL function  Description: INTERVENTIONS:  -  Assess patient's ability to carry out ADLs; assess patient's baseline for ADL function and identify physical deficits which impact ability to perform ADLs (bathing, care of mouth/teeth, toileting, grooming, dressing, etc )  - Assess/evaluate cause of self-care deficits   - Assess range of motion  - Assess patient's mobility; develop plan if impaired  - Assess patient's need for assistive devices and provide as appropriate  - Encourage maximum independence but intervene and supervise when necessary  - Involve family in performance of ADLs  - Assess for home care needs following discharge   - Consider OT consult to assist with ADL evaluation and planning for discharge  - Provide patient education as appropriate  Outcome: Progressing  Goal: Maintain or return mobility status to optimal level  Description: INTERVENTIONS:  - Assess patient's baseline mobility status (ambulation, transfers, stairs, etc )    - Identify cognitive and physical deficits and behaviors that affect mobility  - Identify mobility aids required to assist with transfers and/or ambulation (gait belt, sit-to-stand, lift, walker, cane, etc )  - [place] fall precautions as indicated by assessment  - Record patient progress and toleration of activity level on Mobility SBAR; progress patient to next Phase/Stage  - Instruct patient to call for assistance with activity based on assessment  - Consider rehabilitation consult to assist with strengthening/weightbearing, etc   Outcome: Progressing     Problem: DISCHARGE PLANNING  Goal: Discharge to home or other facility with appropriate resources  Description: INTERVENTIONS:  - Identify barriers to discharge w/patient and caregiver  - Arrange for needed discharge resources and transportation as appropriate  - Identify discharge learning needs (meds, wound care, etc )  - Arrange for interpretive services to assist at discharge as needed  - Refer to Case Management Department for coordinating discharge planning if the patient needs post-hospital services based on physician/advanced practitioner order or complex needs related to functional status, cognitive ability, or social support system  Outcome: Progressing     Problem: Knowledge Deficit  Goal: Patient/family/caregiver demonstrates understanding of disease process, treatment plan, medications, and discharge instructions  Description: Complete learning assessment and assess knowledge base    Interventions:  - Provide teaching at level of understanding  - Provide teaching via preferred learning methods  Outcome: Progressing

## 2020-08-18 NOTE — PROGRESS NOTES
Progress Note - Cardiology   Ema Bhatia 62 y o  male MRN: [id number]  Unit/Bed#: [facility] 529-01 Encounter: [id number]    Assessment / Plan    62 y  o  male with PMHx of CAD s/p PCI 2008, HFrEF (<20% EF 7/20), EUNICE?, HTN, HLD, T2DM, GERD, CKD II, Tobacco use, recent multiple hospitalization for CHF exacerbation, who is a transfer from [facility] for cardiac evaluation     1) Acute Decompensated Heart Failure / Possible Sepsis           08/18/20          144 kg       08/17/20          146 kg (322 lb        08/14/20          146 kg (322Lbs)   08/12/20 (!) 154 kg (339 lb 1 1 oz)   06/29/20 (!) 143 kg (314 lb 2 5 oz)   10/28/19 (!) 153 kg (337 lb)      -Multiple recent hospitalization for HF exacerbation  -Recent echo 08/14/2020 showing an EF of 15-20% with diffuse hypokinesis, moderate biatrial enlargement, SPAP 59, mild to mod MR  -Respiratory distress w/ increase work of breathing, O2 sat 96% on 5 L NC, w/ b/l crackles in lung field and JVD  -Rapid was called to evaluate the need for BiPAP and increase the level of care   -8/13 WBC of 20K, Abnormal ABG  PH 7 398, PCO2 24 5, HCO3 15 1  -Corona Negative  -UA w/ Protein, negative for bacteria, WBC, Nitrites  -8/14-Hepatic Function Panel- AST/ALT  7407/2839, Albumin 3 1, T-Bili 1 99- Direct 0 77  -8/18- Hepatitis Panel & NM Hepatobiliary-- Negative for acute pathology  Transaminitis Resolving-    -Urine Output of 6092-since admit  -GDMT-  -Started Toprol XL 25mg QD, Bumex 1mg PO QD, Entresto 24-26, will discuss to add Eplerenone and LifeVest at D/C    -Cardiac MRI  -Monitor Ins/Out  -Salt and fluid restriction        2) Sustained V-tach Episode     -Patient FTR L 41 seconds run of V-tach per EMS EN route to [address]  -Recent echo 06/26/2020 showing an EF of 20-25% with diffuse hypokinesis  -Started on amiodarone at [facility] 40   -EKG at [facility]- NSR, LAE, Prolonged QT  -8/13 Single Episode of Non Sustained V-Tach, resolved spontaneously  -8/17- 10 beats of V-Tach on tele overnight             Most Recent Potassium of 4 0  -Life Vest at D/C  -Monitor electrolytes  -Monitor telemetry     3) Elevated Troponins     -7/20 LHC from [facility] LAD 30%, 2nd Marginal 30% and Prox RCA 50% Stenosis  Recommended Medical Management  -presented to the ED w/ chest tightness  -Troponin elevation 0 07--> 04---> 10  -EKG- no ischemic changes, continue trend  -Troponemia likely due to acute decompensated HF      4) Coronary Artery Disease  -PCI 2008  --7/20 LHC from [facility] LAD 30%, 2nd Marginal 30% and Prox RCA 50% Stenosis  Recommended Medical Management  -ASA 81mg  -Dc'd Lipitor in setting of Transaminitis      5) Essential Hypertension     -Presented to ED w/ Elevated blood pressure  -BP stable  -on Entresto, Toprol XL 25, Bumex 1mg      Subjective/Objective   Chief Complaint: SOB    Subjective: Pt is feeling better   Denies any chest pain, sob, palpitations, n/v, fever or chills    Objective:     Vitals: /65   Pulse 102   Temp 98 1 °F (36 7 °C)   Resp 18   Ht 5' 9 5" (1 765 m)   Wt (!) 144 kg (317 lb 14 5 oz)   SpO2 95%   BMI 46 95 kg/m²   Vitals:    08/17/20 0535 08/18/20 0600   Weight: (!) 146 kg (322 lb 1 5 oz) (!) 144 kg (317 lb 14 5 oz)     Orthostatic Blood Pressures      Most Recent Value   Blood Pressure  123/65 filed at 08/17/2020 2310   Patient Position - Orthostatic VS  Lying filed at 08/17/2020 1123            Intake/Output Summary (Last 24 hours) at 8/18/2020 0739  Last data filed at 8/18/2020 5801  Gross per 24 hour   Intake 840 ml   Output 1225 ml   Net -385 ml       Invasive Devices     Peripheral Intravenous Line            Peripheral IV 08/15/20 Left Antecubital 2 days                Review of Systems: General ROS: negative  Respiratory ROS: no cough, shortness of breath, or wheezing  Cardiovascular ROS: no chest pain or dyspnea on exertion  Musculoskeletal ROS: negative for pain    Physical Exam: /89   Pulse (!) 113   Temp 97 6 °F (36 4 °C)   Resp 20   Ht 5' 9 5" (1 765 m)   Wt (!) 144 kg (317 lb 14 5 oz)   SpO2 97%   BMI 46 95 kg/m²   General appearance: alert and oriented, in no acute distress  Lungs: clear to auscultation bilaterally  Heart: regular rate and rhythm, S1, S2 normal, no murmur, click, rub or gallop  Extremity: Trace pitting edema B/L    Lab Results:   CBC with diff:   Results from last 7 days   Lab Units 08/18/20  0507   WBC Thousand/uL 9 88   RBC Million/uL 5 34   HEMOGLOBIN g/dL 15 3   HEMATOCRIT % 47 8   MCV fL 90   MCH pg 28 7   MCHC g/dL 32 0   RDW % 15 7*   MPV fL 10 1   PLATELETS Thousands/uL 259     CMP:   Results from last 7 days   Lab Units 08/18/20  0507  08/13/20  0939   SODIUM mmol/L 137   < >  --    POTASSIUM mmol/L 4 2   < >  --    CHLORIDE mmol/L 107   < >  --    CO2 mmol/L 23   < >  --    CO2, I-STAT mmol/L  --   --  16*   BUN mg/dL 16   < >  --    CREATININE mg/dL 1 02   < >  --    GLUCOSE, ISTAT mg/dl  --   --  80   CALCIUM mg/dL 8 4   < >  --    AST U/L 107*   < >  --    ALT U/L 452*   < >  --    ALK PHOS U/L 91   < >  --    EGFR ml/min/1 73sq m 95   < >  --     < > = values in this interval not displayed  Troponin:   0   Lab Value Date/Time    TROPONINI 0 10 (H) 08/13/2020 0953    TROPONINI 0 04 08/13/2020 0358    TROPONINI 0 07 (H) 08/12/2020 2251    TROPONINI 0 09 (H) 06/26/2020 1434    TROPONINI 0 09 (H) 06/26/2020 0816     BNP:   Results from last 7 days   Lab Units 08/18/20  0507  08/13/20  0939   POTASSIUM mmol/L 4 2   < >  --    CHLORIDE mmol/L 107   < >  --    CO2 mmol/L 23   < >  --    CO2, I-STAT mmol/L  --   --  16*   BUN mg/dL 16   < >  --    CREATININE mg/dL 1 02   < >  --    GLUCOSE, ISTAT mg/dl  --   --  80   CALCIUM mg/dL 8 4   < >  --    EGFR ml/min/1 73sq m 95   < >  --     < > = values in this interval not displayed       Coags:     TSH:   Results from last 7 days   Lab Units 08/12/20  2251   TSH 3RD GENERATON uIU/mL 7 884*     Magnesium:   Results from last 7 days   Lab Units 08/13/20  0953   MAGNESIUM mg/dL 2 2     Lipid Profile:     Imaging: I have personally reviewed pertinent films in PACS and I have personally reviewed pertinent films in PACS with a Radiologist   EKG:   VTE Pharmacologic Prophylaxis: Enoxaparin (Lovenox)  VTE Mechanical Prophylaxis: reason for no mechanical VTE prophylaxis lovenox    Counseling / Coordination of Care  Total time spent today 30 minutes  Greater than 50% of total time was spent with the patient and / or family counseling and / or coordination of care  A description of the counseling / coordination of care: Karrie Nissen

## 2020-08-19 LAB
ALBUMIN SERPL BCP-MCNC: 3.3 G/DL (ref 3.5–5)
ALP SERPL-CCNC: 89 U/L (ref 46–116)
ALT SERPL W P-5'-P-CCNC: 359 U/L (ref 12–78)
ANION GAP SERPL CALCULATED.3IONS-SCNC: 9 MMOL/L (ref 4–13)
AST SERPL W P-5'-P-CCNC: 57 U/L (ref 5–45)
BASOPHILS # BLD AUTO: 0.04 THOUSANDS/ΜL (ref 0–0.1)
BASOPHILS NFR BLD AUTO: 0 % (ref 0–1)
BILIRUB DIRECT SERPL-MCNC: 0.31 MG/DL (ref 0–0.2)
BILIRUB SERPL-MCNC: 0.98 MG/DL (ref 0.2–1)
BUN SERPL-MCNC: 16 MG/DL (ref 5–25)
CALCIUM SERPL-MCNC: 8.5 MG/DL (ref 8.3–10.1)
CHLORIDE SERPL-SCNC: 107 MMOL/L (ref 100–108)
CO2 SERPL-SCNC: 22 MMOL/L (ref 21–32)
CREAT SERPL-MCNC: 1.17 MG/DL (ref 0.6–1.3)
EOSINOPHIL # BLD AUTO: 0.25 THOUSAND/ΜL (ref 0–0.61)
EOSINOPHIL NFR BLD AUTO: 2 % (ref 0–6)
ERYTHROCYTE [DISTWIDTH] IN BLOOD BY AUTOMATED COUNT: 16 % (ref 11.6–15.1)
GFR SERPL CREATININE-BSD FRML MDRD: 80 ML/MIN/1.73SQ M
GLUCOSE SERPL-MCNC: 103 MG/DL (ref 65–140)
GLUCOSE SERPL-MCNC: 118 MG/DL (ref 65–140)
GLUCOSE SERPL-MCNC: 122 MG/DL (ref 65–140)
GLUCOSE SERPL-MCNC: 134 MG/DL (ref 65–140)
GLUCOSE SERPL-MCNC: 80 MG/DL (ref 65–140)
HCT VFR BLD AUTO: 49.9 % (ref 36.5–49.3)
HGB BLD-MCNC: 15.9 G/DL (ref 12–17)
IMM GRANULOCYTES # BLD AUTO: 0.04 THOUSAND/UL (ref 0–0.2)
IMM GRANULOCYTES NFR BLD AUTO: 0 % (ref 0–2)
KAPPA LC FREE SER-MCNC: 46.7 MG/L (ref 3.3–19.4)
KAPPA LC FREE/LAMBDA FREE SER: 2.61 {RATIO} (ref 0.26–1.65)
LAMBDA LC FREE SERPL-MCNC: 17.9 MG/L (ref 5.7–26.3)
LYMPHOCYTES # BLD AUTO: 3.83 THOUSANDS/ΜL (ref 0.6–4.47)
LYMPHOCYTES NFR BLD AUTO: 36 % (ref 14–44)
MCH RBC QN AUTO: 28.6 PG (ref 26.8–34.3)
MCHC RBC AUTO-ENTMCNC: 31.9 G/DL (ref 31.4–37.4)
MCV RBC AUTO: 90 FL (ref 82–98)
MONOCYTES # BLD AUTO: 1.01 THOUSAND/ΜL (ref 0.17–1.22)
MONOCYTES NFR BLD AUTO: 9 % (ref 4–12)
NEUTROPHILS # BLD AUTO: 5.61 THOUSANDS/ΜL (ref 1.85–7.62)
NEUTS SEG NFR BLD AUTO: 53 % (ref 43–75)
NRBC BLD AUTO-RTO: 0 /100 WBCS
PLATELET # BLD AUTO: 284 THOUSANDS/UL (ref 149–390)
PMV BLD AUTO: 10.3 FL (ref 8.9–12.7)
POTASSIUM SERPL-SCNC: 4.4 MMOL/L (ref 3.5–5.3)
PROT SERPL-MCNC: 8.2 G/DL (ref 6.4–8.2)
RBC # BLD AUTO: 5.56 MILLION/UL (ref 3.88–5.62)
RYE IGE QN: NEGATIVE
SODIUM SERPL-SCNC: 138 MMOL/L (ref 136–145)
WBC # BLD AUTO: 10.78 THOUSAND/UL (ref 4.31–10.16)

## 2020-08-19 PROCEDURE — 80076 HEPATIC FUNCTION PANEL: CPT | Performed by: INTERNAL MEDICINE

## 2020-08-19 PROCEDURE — 85025 COMPLETE CBC W/AUTO DIFF WBC: CPT | Performed by: INTERNAL MEDICINE

## 2020-08-19 PROCEDURE — 82948 REAGENT STRIP/BLOOD GLUCOSE: CPT

## 2020-08-19 PROCEDURE — 80048 BASIC METABOLIC PNL TOTAL CA: CPT | Performed by: INTERNAL MEDICINE

## 2020-08-19 PROCEDURE — 99232 SBSQ HOSP IP/OBS MODERATE 35: CPT | Performed by: INTERNAL MEDICINE

## 2020-08-19 RX ORDER — METOPROLOL SUCCINATE 50 MG/1
50 TABLET, EXTENDED RELEASE ORAL 2 TIMES DAILY
Status: DISCONTINUED | OUTPATIENT
Start: 2020-08-19 | End: 2020-08-21 | Stop reason: HOSPADM

## 2020-08-19 RX ORDER — ATORVASTATIN CALCIUM 40 MG/1
40 TABLET, FILM COATED ORAL
Status: DISCONTINUED | OUTPATIENT
Start: 2020-08-19 | End: 2020-08-21 | Stop reason: HOSPADM

## 2020-08-19 RX ORDER — SACUBITRIL AND VALSARTAN 24; 26 MG/1; MG/1
1 TABLET, FILM COATED ORAL 2 TIMES DAILY
Qty: 60 TABLET | Refills: 1 | Status: SHIPPED | OUTPATIENT
Start: 2020-08-19 | End: 2020-08-21 | Stop reason: HOSPADM

## 2020-08-19 RX ORDER — BUMETANIDE 1 MG/1
1 TABLET ORAL 2 TIMES DAILY
Status: DISCONTINUED | OUTPATIENT
Start: 2020-08-19 | End: 2020-08-20

## 2020-08-19 RX ORDER — POTASSIUM CHLORIDE 20 MEQ/1
40 TABLET, EXTENDED RELEASE ORAL DAILY
Status: DISCONTINUED | OUTPATIENT
Start: 2020-08-19 | End: 2020-08-21 | Stop reason: HOSPADM

## 2020-08-19 RX ORDER — SPIRONOLACTONE 25 MG/1
25 TABLET ORAL DAILY
Status: DISCONTINUED | OUTPATIENT
Start: 2020-08-19 | End: 2020-08-19

## 2020-08-19 RX ADMIN — ENOXAPARIN SODIUM 40 MG: 40 INJECTION SUBCUTANEOUS at 22:00

## 2020-08-19 RX ADMIN — ENOXAPARIN SODIUM 40 MG: 40 INJECTION SUBCUTANEOUS at 09:35

## 2020-08-19 RX ADMIN — FAMOTIDINE 20 MG: 20 TABLET ORAL at 09:35

## 2020-08-19 RX ADMIN — SACUBITRIL AND VALSARTAN 1 TABLET: 24; 26 TABLET, FILM COATED ORAL at 17:38

## 2020-08-19 RX ADMIN — POTASSIUM CHLORIDE 40 MEQ: 1500 TABLET, EXTENDED RELEASE ORAL at 13:42

## 2020-08-19 RX ADMIN — DICLOFENAC SODIUM 2 G: 10 GEL TOPICAL at 09:36

## 2020-08-19 RX ADMIN — METOPROLOL SUCCINATE 25 MG: 25 TABLET, EXTENDED RELEASE ORAL at 09:35

## 2020-08-19 RX ADMIN — ASPIRIN 81 MG: 81 TABLET, COATED ORAL at 09:35

## 2020-08-19 RX ADMIN — BUMETANIDE 1 MG: 1 TABLET ORAL at 09:35

## 2020-08-19 RX ADMIN — METOPROLOL SUCCINATE 50 MG: 50 TABLET, EXTENDED RELEASE ORAL at 17:38

## 2020-08-19 RX ADMIN — ATORVASTATIN CALCIUM 40 MG: 40 TABLET, FILM COATED ORAL at 17:38

## 2020-08-19 RX ADMIN — SACUBITRIL AND VALSARTAN 1 TABLET: 24; 26 TABLET, FILM COATED ORAL at 09:35

## 2020-08-19 RX ADMIN — BUMETANIDE 1 MG: 1 TABLET ORAL at 17:38

## 2020-08-19 RX ADMIN — MELATONIN 3 MG: at 22:00

## 2020-08-19 NOTE — PROGRESS NOTES
INTERNAL MEDICINE RESIDENCY PROGRESS NOTE     Name: Shira Duran   Age & Sex: 62 y o  male   MRN: [id number]  Unit/Bed#: [facility] 529-01   Encounter: [id number]  Team: SOD Team B     PATIENT INFORMATION     Name: Shira Duran   Age & Sex: 62 y o  male   MRN: [id number]  Hospital Stay Days: 6    ASSESSMENT/PLAN     Principal Problem:    Acute combined systolic and diastolic congestive heart failure ([facility] [address] )  Active Problems:    Transaminitis    Sustained VT (ventricular tachycardia) (HCC)    Elevated troponin level not due myocardial infarction    Spondylolisthesis at L4-L5 level    Foraminal stenosis of lumbar region    Chronic obstructive lung disease (HCC)    Chronic pain    GERD (gastroesophageal reflux disease)    Morbid obesity ([facility] )    Essential hypertension    Tobacco use disorder    CKD (chronic kidney disease) stage 2, GFR 60-89 ml/min    Hypothyroidism (acquired)    Pre-diabetes    Hyperlipidemia    Vitamin D deficiency    Pericardial effusion      Acute cholecystitisresolved as of 8/17/2020  Assessment & Plan  · Findings of acute cholecystitis seen on CT abdomen   · Will empirically treat with ceftriaxone and flagyl as patient met SIRS Criteria  · HIDA scan negative     Transaminitis  Assessment & Plan  · Patient is being evaluated for abnormal liver chemistry  · The patient had acute Severe elevation of liver enzymes (>15x ULN), normal enzymes 2 days prior to elevation, currently it has improved significantly   · Jaundice present: no  · R- ratio suggests- Hepatocellular  · Signs of acute liver failure?- no  · Likely etiology includes: ischemic hepatitis given episode of hypotension after diuresis and improvement of liver enzymes significantly over the past few days   · USG with doppler negative   · Negative for acute viral hepatitis   · Discontinue hepatotoxic medications  · Avoid alcohol consumption   · Will stop trending liver enzymes due to significant improvement and good blood pressures · Can re-start statin on discharge     Acute respiratory failure with hypoxia (HCC)resolved as of 8/15/2020  Assessment & Plan  · Patient requiring 6LNC on presentation  · Likely etiology includes acute decompensated heart failure vs sepsis  · ABG was showing metabolic acidosis with respiratory compensation   · Patients source of sepsis would likely be cholecystitis  · Currently on empiric abx with ceftriaxone and flagyl  · Await blood cultures to return   · Currently seems to be doing much better from a breathing standpoint     * Acute combined systolic and diastolic congestive heart failure (HCC)  Assessment & Plan  Wt Readings from Last 3 Encounters:   08/12/20 (!) 154 kg (339 lb 1 1 oz)   06/29/20 (!) 143 kg (314 lb 2 5 oz)   10/28/19 (!) 153 kg (337 lb)     · Echo 06/26/2020-- EF of 15% and Grade 2 DD  · Mild to moderate pericardial effusion on CTA chest  · Patient is on 1mg of bumex daily, will be continued and appears euvolemic currently   · Making good urine  · Cardiac MRI read pending   · Also needs Lifevest prior to discharge     Sustained VT (ventricular tachycardia) ([facility] [address] )  Assessment & Plan  · Patient had 32 seconds run of V-tach per EMS EN route to [facility]  · Likely secondary to ischemic cardiomyopathy  · Recent echo 06/26/2020 showing an EF of 20-25% with diffuse hypokinesis  · Started on amiodarone at [facility], currently off drip  · Monitor electrolytes  · Cardiology following  · Monitor tele  · Needs Lifevest prior to discharge       Elevated troponin level not due myocardial infarction  Assessment & Plan  · Troponin peaked at 0 07 and then downtrended  · No ischemic changes on EKG and patient had recent cardiac cath   · Likely in the setting of Acute decompensated heart failure     Vitamin D deficiency  Assessment & Plan  · Vitamin-D level around 13  · Recommended vitamin-D supplementation/ dietary changes/sun exposure  · Follow-up outpatient    Hyperlipidemia  Assessment & Plan  · Patient not taking any medication  · started high-intensity statin    Pre-diabetes  Assessment & Plan  · Hemoglobin A1c 6 2  · Educated on weight loss/dietary modification  · Not currently on any medication    Hypothyroidism (acquired)  Assessment & Plan  · Patient with elevated TSH >7  · Free T4 normal  · Patient not taking any medication  · Follow-up outpatient PCP    CKD (chronic kidney disease) stage 2, GFR 60-89 ml/min  Assessment & Plan  · Creatinine 1 55 on presentation currently improved and back to baseline around 1 3-1 4  · Monitor BMP    Tobacco use disorder  Assessment & Plan  · Smoking cessation advised  · States he quit last month      Essential hypertension  Assessment & Plan  · Patient on home ARB-HCTZ 300-12 5 mg daily  · Will continue  · Monitor BMP, BP    Morbid obesity (HCC)  Assessment & Plan  · Advise Weight loss /bariatric surgery  · Follow-up palpation  · Educated on lifestyle modification      GERD (gastroesophageal reflux disease)  Assessment & Plan  · Not on home medications  · Follow up out pt pcp  · Avoid NSAIDs and triggering foods  · Weight loss advised      Disposition: Pending lifevest      SUBJECTIVE     Patient seen and examined  No acute events overnight  Patient denies chest pain, palpitations, SOB, cough, abdominal pain, nausea, vomiting, constipation, diarrhea, fevers/chills, headaches, dysuria  OBJECTIVE     Vitals:    20 2352 20 0600 20 0700 20 1056   BP: 132/93  116/86 138/79   BP Location:   Right arm Right arm   Pulse: (!) 110  (!) 106 (!) 106   Resp: 18  18 18   Temp: 98 °F (36 7 °C)  97 8 °F (36 6 °C) 97 9 °F (36 6 °C)   TempSrc:   Oral Oral   SpO2: 94%  96% 97%   Weight:  (!) 142 kg (312 lb 13 3 oz)     Height:          Temperature:   Temp (24hrs), Av 8 °F (36 6 °C), Min:97 6 °F (36 4 °C), Max:98 °F (36 7 °C)    Temperature: 97 9 °F (36 6 °C)  Intake & Output:  I/O       701 -  0700 701 -  07 0700    P  O  840 480     Total Intake(mL/kg) 840 (5 8) 480 (3 4)     Urine (mL/kg/hr) 1225 (0 4) 2130 (0 6)     Total Output 1225 2130     Net -385 -1650            Unmeasured Urine Occurrence 2 x          Weights:   IBW: 71 85 kg    Body mass index is 46 2 kg/m²  Weight (last 2 days)     Date/Time   Weight    08/19/20 0600   (!) 142 (312 83)    08/18/20 0600   (!) 144 (317 9)    08/17/20 0535   (!) 146 (322 09)            Physical Exam:   GENERAL: NAD, morbidly obese   HEENT:  NC/AT, MMM, no scleral icterus  CARDIAC:  RRR, +S1/S2, no S3/S4 heard, no m/g/r  PULMONARY:  CTA B/L, no wheezing/rales/rhonci, non-labored breathing  ABDOMEN:  Soft, NT/ND, +BS, no rebound/guarding/rigidity  Extremities:  2+ Pulses in DP/PT  No edema, cyanosis, or clubbing  NEUROLOGIC:  Alert/oriented x3  SKIN:  No rashes or erythema    LABORATORY DATA     Labs: I have personally reviewed pertinent reports  Results from last 7 days   Lab Units 08/19/20  0641 08/18/20  0507 08/17/20  0522 08/15/20  0450   WBC Thousand/uL 10 78* 9 88 9 43 9 12   HEMOGLOBIN g/dL 15 9 15 3 14 9 14 4   HEMATOCRIT % 49 9* 47 8 46 7 44 2   PLATELETS Thousands/uL 284 259 259 265   NEUTROS PCT % 53 53  --  59   MONOS PCT % 9 10  --  11      Results from last 7 days   Lab Units 08/19/20  0641 08/18/20  0507 08/17/20  0522  08/13/20  0939   POTASSIUM mmol/L 4 4 4 2 4 0   < >  --    CHLORIDE mmol/L 107 107 107   < >  --    CO2 mmol/L 22 23 23   < >  --    CO2, I-STAT mmol/L  --   --   --   --  16*   BUN mg/dL 16 16 16   < >  --    CREATININE mg/dL 1 17 1 02 1 17   < >  --    CALCIUM mg/dL 8 5 8 4 8 6   < >  --    ALK PHOS U/L 89 91 103   < >  --    ALT U/L 359* 452* 674*   < >  --    AST U/L 57* 107* 250*   < >  --    GLUCOSE, ISTAT mg/dl  --   --   --   --  80    < > = values in this interval not displayed       Results from last 7 days   Lab Units 08/13/20  0953 08/12/20  2251   MAGNESIUM mg/dL 2 2 1 7     Results from last 7 days   Lab Units 08/13/20  0953   PHOSPHORUS mg/dL 4 9*          Results from last 7 days   Lab Units 08/13/20  2349   LACTIC ACID mmol/L 1 7     Results from last 7 days   Lab Units 08/13/20  0953 08/13/20  0358 08/12/20  2251   TROPONIN I ng/mL 0 10* 0 04 0 07*       IMAGING & DIAGNOSTIC TESTING     Radiology Results: I have personally reviewed pertinent reports  Xr Chest Portable    Result Date: 8/13/2020  Impression: No acute cardiopulmonary disease  Workstation performed: [id number]     Nm Hepatobiliary    Result Date: 8/15/2020  Impression: There is visualization of the gallbladder indicating a patent cystic duct  Workstation performed: [id number]     Pe Study With Ct Abdomen And Pelvis With Contrast    Result Date: 8/13/2020  Impression: Extensive bilateral striated nephrograms concerning for pyelonephritis versus atypical infiltrating process  Recommend follow-up to resolution  Mild to moderate pericardial effusion  Gallbladder wall is thickened and enhancing concerning for cholecystitis  Workstation performed: [id number]     Us Right Upper Quadrant With Liver Dopplers    Result Date: 8/16/2020  Impression: 1  Mild hepatomegaly with diffuse increased hepatic echotexture consistent with steatosis and/or fibrosis  2   Slightly contracted gallbladder, likely related to the patient's non-NPO state  3   No evidence for cholecystitis, cholelithiasis, sonographic Hdz sign, pericholecystic fluid, choledocholithiasis or biliary obstruction  4   Normal portal venous and hepatic venous waveforms  Hepatic artery is not imaged, likely related to technical limitations of the study    Workstation performed: [id number]     Other Diagnostic Testing: I have personally reviewed pertinent reports      ACTIVE MEDICATIONS     Current Facility-Administered Medications   Medication Dose Route Frequency    aspirin (ECOTRIN LOW STRENGTH) EC tablet 81 mg  81 mg Oral Daily    atorvastatin (LIPITOR) tablet 40 mg  40 mg Oral Daily With Dinner    bumetanide (BUMEX) tablet 1 mg  1 mg Oral Daily    diclofenac sodium (VOLTAREN) 1 % topical gel 2 g  2 g Topical Daily    enoxaparin (LOVENOX) subcutaneous injection 40 mg  40 mg Subcutaneous Q12H [address]    famotidine (PEPCID) tablet 20 mg  20 mg Oral Daily    insulin lispro (HumaLOG) 100 units/mL subcutaneous injection 1-6 Units  1-6 Units Subcutaneous TID AC    LORazepam (ATIVAN) tablet 0 5 mg  0 5 mg Oral Once    melatonin tablet 3 mg  3 mg Oral HS    metoprolol succinate (TOPROL-XL) 24 hr tablet 25 mg  25 mg Oral Daily    sacubitril-valsartan (ENTRESTO) 24-26 MG per tablet 1 tablet  1 tablet Oral BID    trimethobenzamide (TIGAN) IM injection 200 mg  200 mg Intramuscular Q6H PRN       VTE Pharmacologic Prophylaxis: Enoxaparin (Lovenox)  VTE Mechanical Prophylaxis: sequential compression device    Portions of the record may have been created with voice recognition software  Occasional wrong word or "sound a like" substitutions may have occurred due to the inherent limitations of voice recognition software    Read the chart carefully and recognize, using context, where substitutions have occurred   ==  Erendira Richardson MD  [address] [facility]  Internal Medicine Residency PGY-3

## 2020-08-19 NOTE — SOCIAL WORK
Cm contacted pt's [facility] in [facility] is $8 95  Order for lifevest received and faxed with clinicals to alex

## 2020-08-19 NOTE — PROGRESS NOTES
Progress Note - Cardiology   Karolina Mckeon 62 y o  male MRN: [id number]  [facility] Encounter: [id number]    Assessment / Plan    62 y  o  male with PMHx of CAD s/p PCI 2008, HFrEF (<20% EF 7/20), EUNICE?, HTN, HLD, T2DM, GERD, CKD II, Tobacco use, recent multiple hospitalization for CHF exacerbation, who is a transfer from [facility] for cardiac evaluation     1) Acute Decompensated Heart Failure / Possible Sepsis           08/19/20          142 kg (312 lb 13 3 oz       08/18/20          144 kg       08/17/20          146 kg (322 lb        08/14/20          146 kg (322Lbs)   08/12/20 (!) 154 kg (339 lb 1 1 oz)   06/29/20 (!) 143 kg (314 lb 2 5 oz)   10/28/19 (!) 153 kg (337 lb)      -Multiple recent hospitalization for HF exacerbation  -Recent echo 08/14/2020 showing an EF of 15-20% with diffuse hypokinesis, moderate biatrial enlargement, SPAP 59, mild to mod MR  -Respiratory distress w/ increase work of breathing, O2 sat 96% on 5 L NC, w/ b/l crackles in lung field and JVD  -Rapid was called to evaluate the need for BiPAP and increase the level of care   -8/13 WBC of 20K, Abnormal ABG  PH 7 398, PCO2 24 5, HCO3 15 1  -Corona Negative  -UA w/ Protein, negative for bacteria, WBC, Nitrites  -8/14-Hepatic Function Panel- AST/ALT  4768/6914, Albumin 3 1, T-Bili 1 99- Direct 0 77  -8/19- Hepatitis Panel & NM Hepatobiliary-- Negative for acute pathology  Transaminitis Resolving- AST  ALT   -Urine Output of 7742cc,-since admit, since yday 1650cc  -GDMT-  -Started Toprol XL 50mg BID, Bumex 1mg PO BID, Entresto 24-26   -LifeVest at D/C    -Cardiac MRI pending  -Monitor Ins/Out  -Salt and fluid restriction        2) Sustained V-tach Episode     -Patient [facility] B 99 seconds run of V-tach per EMS EN route to [address]  -Recent echo 06/26/2020 showing an EF of 20-25% with diffuse hypokinesis  -Started on amiodarone at [place] [address]   -EKG at [facility]- NSR, LAE, Prolonged QT  -8/13 Single Episode of Non Sustained V-Tach, resolved spontaneously  -8/18- 13 beats of V-Tach on tele overnight             Most Recent Potassium of 4 4  -Life Vest at D/C  -Monitor electrolytes  -Monitor telemetry     3) Elevated Troponins     -7/20 LHC from [facility] LAD 30%, 2nd Marginal 30% and Prox RCA 50% Stenosis  Recommended Medical Management  -presented to the ED w/ chest tightness  -Troponin elevation 0 07--> 04---> 10  -EKG- no ischemic changes, continue trend  -Troponemia likely due to acute decompensated HF      4) Coronary Artery Disease  -PCI 2008  --7/20 LHC from [facility] LAD 30%, 2nd Marginal 30% and Prox RCA 50% Stenosis  Recommended Medical Management  -ASA 81mg  -Dc'd Lipitor in setting of Transaminitis      5) Essential Hypertension     -Presented to ED w/ Elevated blood pressure  -BP stable  -on Entresto, Toprol XL 50 BID, Bumex 1mg PO BID      Subjective/Objective   Chief Complaint:     Subjective: Pt denies any chest pain, palpitations, difficulty breathing, n/v, fever/chills       Objective:     Vitals: /93   Pulse (!) 110   Temp 98 °F (36 7 °C)   Resp 18   Ht 5' 9 5" (1 765 m)   Wt (!) 142 kg (312 lb 13 3 oz)   SpO2 94%   BMI 46 20 kg/m²   Vitals:    08/18/20 0600 08/19/20 0600   Weight: (!) 144 kg (317 lb 14 5 oz) (!) 142 kg (312 lb 13 3 oz)     Orthostatic Blood Pressures      Most Recent Value   Blood Pressure  132/93 filed at 08/18/2020 2352   Patient Position - Orthostatic VS  Lying filed at 08/18/2020 1928            Intake/Output Summary (Last 24 hours) at 8/19/2020 2442  Last data filed at 8/19/2020 0557  Gross per 24 hour   Intake 480 ml   Output 2130 ml   Net -1650 ml       Invasive Devices     Peripheral Intravenous Line            Peripheral IV 08/19/20 Right Wrist less than 1 day                Review of Systems: General ROS: negative  Respiratory ROS: no cough, shortness of breath, or wheezing  Cardiovascular ROS: no chest pain or dyspnea on exertion  Musculoskeletal ROS: negative    Physical Exam: /79 (BP Location: Right arm)   Pulse (!) 106   Temp 97 9 °F (36 6 °C) (Oral)   Resp 18   Ht 5' 9 5" (1 765 m)   Wt (!) 142 kg (312 lb 13 3 oz)   SpO2 97%   BMI 46 20 kg/m²   General appearance: alert and oriented, in no acute distress  Lungs: clear to auscultation bilaterally  Heart: regular rate and rhythm, S1, S2 normal, no murmur, click, rub or gallop  Extremities: Trace edema LE B/L    Lab Results:   CBC with diff:   Results from last 7 days   Lab Units 08/19/20  0641   WBC Thousand/uL 10 78*   RBC Million/uL 5 56   HEMOGLOBIN g/dL 15 9   HEMATOCRIT % 49 9*   MCV fL 90   MCH pg 28 6   MCHC g/dL 31 9   RDW % 16 0*   MPV fL 10 3   PLATELETS Thousands/uL 284     CMP:   Results from last 7 days   Lab Units 08/18/20  0507  08/13/20  0939   SODIUM mmol/L 137   < >  --    POTASSIUM mmol/L 4 2   < >  --    CHLORIDE mmol/L 107   < >  --    CO2 mmol/L 23   < >  --    CO2, I-STAT mmol/L  --   --  16*   BUN mg/dL 16   < >  --    CREATININE mg/dL 1 02   < >  --    GLUCOSE, ISTAT mg/dl  --   --  80   CALCIUM mg/dL 8 4   < >  --    AST U/L 107*   < >  --    ALT U/L 452*   < >  --    ALK PHOS U/L 91   < >  --    EGFR ml/min/1 73sq m 95   < >  --     < > = values in this interval not displayed  Troponin:   0   Lab Value Date/Time    TROPONINI 0 10 (H) 08/13/2020 0953    TROPONINI 0 04 08/13/2020 0358    TROPONINI 0 07 (H) 08/12/2020 2251    TROPONINI 0 09 (H) 06/26/2020 1434    TROPONINI 0 09 (H) 06/26/2020 0816     BNP:   Results from last 7 days   Lab Units 08/18/20  0507  08/13/20  0939   POTASSIUM mmol/L 4 2   < >  --    CHLORIDE mmol/L 107   < >  --    CO2 mmol/L 23   < >  --    CO2, I-STAT mmol/L  --   --  16*   BUN mg/dL 16   < >  --    CREATININE mg/dL 1 02   < >  --    GLUCOSE, ISTAT mg/dl  --   --  80   CALCIUM mg/dL 8 4   < >  --    EGFR ml/min/1 73sq m 95   < >  --     < > = values in this interval not displayed       Coags:     TSH:   Results from last 7 days   Lab Units 08/12/20  2251   TSH 3RD GENERATON uIU/mL 7 884*     Magnesium:   Results from last 7 days   Lab Units 08/13/20  0953   MAGNESIUM mg/dL 2 2     Lipid Profile:     Imaging: I have personally reviewed pertinent films in PACS and I have personally reviewed pertinent films in PACS with a Radiologist   EKG:   VTE Pharmacologic Prophylaxis: Enoxaparin (Lovenox)  VTE Mechanical Prophylaxis: reason for no mechanical VTE prophylaxis lovenox    Counseling / Coordination of Care  Total time spent today 3 minutes  Greater than 50% of total time was spent with the patient and / or family counseling and / or coordination of care  A description of the counseling / coordination of care: Donnie Kaminski

## 2020-08-20 LAB
ALBUMIN UR ELPH-MCNC: 100 %
ALPHA1 GLOB MFR UR ELPH: 0 %
ALPHA2 GLOB MFR UR ELPH: 0 %
ANION GAP SERPL CALCULATED.3IONS-SCNC: 8 MMOL/L (ref 4–13)
B-GLOBULIN MFR UR ELPH: 0 %
BUN SERPL-MCNC: 18 MG/DL (ref 5–25)
CALCIUM SERPL-MCNC: 9 MG/DL (ref 8.3–10.1)
CHLORIDE SERPL-SCNC: 106 MMOL/L (ref 100–108)
CO2 SERPL-SCNC: 23 MMOL/L (ref 21–32)
CREAT SERPL-MCNC: 1.38 MG/DL (ref 0.6–1.3)
GAMMA GLOB MFR UR ELPH: 0 %
GFR SERPL CREATININE-BSD FRML MDRD: 66 ML/MIN/1.73SQ M
GLUCOSE SERPL-MCNC: 103 MG/DL (ref 65–140)
GLUCOSE SERPL-MCNC: 119 MG/DL (ref 65–140)
GLUCOSE SERPL-MCNC: 121 MG/DL (ref 65–140)
GLUCOSE SERPL-MCNC: 123 MG/DL (ref 65–140)
GLUCOSE SERPL-MCNC: 128 MG/DL (ref 65–140)
POTASSIUM SERPL-SCNC: 4.7 MMOL/L (ref 3.5–5.3)
PROT PATTERN UR ELPH-IMP: ABNORMAL
PROT UR-MCNC: 37 MG/DL
SODIUM SERPL-SCNC: 137 MMOL/L (ref 136–145)

## 2020-08-20 PROCEDURE — 99232 SBSQ HOSP IP/OBS MODERATE 35: CPT | Performed by: INTERNAL MEDICINE

## 2020-08-20 PROCEDURE — 82948 REAGENT STRIP/BLOOD GLUCOSE: CPT

## 2020-08-20 PROCEDURE — 80048 BASIC METABOLIC PNL TOTAL CA: CPT | Performed by: INTERNAL MEDICINE

## 2020-08-20 RX ORDER — BUMETANIDE 0.5 MG/1
0.5 TABLET ORAL 2 TIMES DAILY
Qty: 60 TABLET | Refills: 0 | Status: CANCELLED | OUTPATIENT
Start: 2020-08-20

## 2020-08-20 RX ORDER — BUMETANIDE 1 MG/1
0.5 TABLET ORAL 2 TIMES DAILY
Status: DISCONTINUED | OUTPATIENT
Start: 2020-08-20 | End: 2020-08-21 | Stop reason: HOSPADM

## 2020-08-20 RX ADMIN — POTASSIUM CHLORIDE 40 MEQ: 1500 TABLET, EXTENDED RELEASE ORAL at 09:09

## 2020-08-20 RX ADMIN — ATORVASTATIN CALCIUM 40 MG: 40 TABLET, FILM COATED ORAL at 17:18

## 2020-08-20 RX ADMIN — MELATONIN 3 MG: at 21:01

## 2020-08-20 RX ADMIN — SACUBITRIL AND VALSARTAN 1 TABLET: 24; 26 TABLET, FILM COATED ORAL at 18:00

## 2020-08-20 RX ADMIN — ENOXAPARIN SODIUM 40 MG: 40 INJECTION SUBCUTANEOUS at 20:58

## 2020-08-20 RX ADMIN — SACUBITRIL AND VALSARTAN 1 TABLET: 24; 26 TABLET, FILM COATED ORAL at 09:10

## 2020-08-20 RX ADMIN — BUMETANIDE 0.5 MG: 1 TABLET ORAL at 17:18

## 2020-08-20 RX ADMIN — METOPROLOL SUCCINATE 50 MG: 50 TABLET, EXTENDED RELEASE ORAL at 09:10

## 2020-08-20 RX ADMIN — ENOXAPARIN SODIUM 40 MG: 40 INJECTION SUBCUTANEOUS at 09:10

## 2020-08-20 RX ADMIN — ASPIRIN 81 MG: 81 TABLET, COATED ORAL at 09:10

## 2020-08-20 RX ADMIN — FAMOTIDINE 20 MG: 20 TABLET ORAL at 09:09

## 2020-08-20 RX ADMIN — BUMETANIDE 1 MG: 1 TABLET ORAL at 09:10

## 2020-08-20 RX ADMIN — METOPROLOL SUCCINATE 50 MG: 50 TABLET, EXTENDED RELEASE ORAL at 17:17

## 2020-08-20 NOTE — RESTORATIVE TECHNICIAN NOTE
Restorative Specialist Mobility Note       Activity: Up ad dilshad     Assistive Device: Front wheel walker

## 2020-08-20 NOTE — PROGRESS NOTES
Pt refusing to wear SCD pumps  Pt educated on purpose of SCDs by prior nurse Iris Vargas) during shift change/rounding with myself  Pt says that it brings upon his sciatica  Pt says he is "not refusing" the SCDs, he is just choosing to not wear them  Pt was finally agreeable to try wearing them again, but he removed them within 2 minutes of us leaving his room

## 2020-08-20 NOTE — PROGRESS NOTES
Cardiology   Dendharmesh Civil 62 y o  male MRN: [id number]  Unit/Bed#: [facility] 529-01 Encounter: [id number]          Assessment/Plan:    >> Combined ischemic, non ischemic cardiomyopathy- EF ~20 %  >> Non obstructive CAD  >> Morbid obesity  >> HTN  >> EUNICE  >> Transaminitis: resolving  >> LUCY    Plan:    Continue GDMT with toprol and entresto  CM consult for entresto  Cr spiked today, reduce bumex to 0 5 mg bid  Add aldactone once cr stable (can do OP)  Continue baby ASA and lipitor 40  F/u cardiac MRI  Life vest fitting today  Has f/u appt OP with cardiology (included in DC instructions)  Daily weight  Intake and output charting      8/20/2020: No complaints, weight down to 310 lbs from 322 at admission  Cr did spike slightly last night      Physical exam  Objective   Vitals: Blood pressure 137/72, pulse 98, temperature 97 6 °F (36 4 °C), temperature source Oral, resp  rate 18, height 5' 9 5" (1 765 m), weight (!) 141 kg (310 lb 6 5 oz), SpO2 96 %  Orthostatic Blood Pressures      Most Recent Value   Blood Pressure  137/72 filed at 08/20/2020 0730   Patient Position - Orthostatic VS  Sitting filed at 08/20/2020 0730        General:  AO x3, no acute distress, morbidly obese  Cardiac:  S1-S2 normal  No murmurs, rubs or gallops, JVP: none  Lungs:  Clear to auscultation bilaterally, no wheezing or crackles  Abdomen:  Soft, nontender, nondistended  Extremities:  Warm, well perfused, pulses palpable, no ulcers or rashes  Edema:none  Neuro: Grossly nonfocal      ======================================================  TREADMILL STRESS  No results found for this or any previous visit    ----------------------------------------------------------------------------------------------  NUCLEAR STRESS TEST: No results found for this or any previous visit    No results found for this or any previous visit     --------------------------------------------------------------------------------  CATH:  No results found for this or any previous visit   --------------------------------------------------------------------------------  ECHO:   Results for orders placed during the hospital encounter of 20   Echo complete with contrast if indicated    [address]ative [address]  [facility] [place] [address] [facility]  [phone]    Transthoracic Echocardiogram  2D, M-mode, Doppler, and Color Doppler    Study date:  14-Aug-2020    Patient: Julia Torres  MR number: [id number]  Account number: [de-identified]  : 1965  Age: 62 years  Gender: Male  Status: Inpatient  Location: Bedside  Height: 69 in  Weight: 322 1 lb  BP: 135/ 70 mmHg    Indications: Pulmonary hypertension  Diagnoses: I27 0 - Primary pulmonary hypertension    Sonographer:  DEV Hua  Referring Physician:  Jared Alcaraz DO  Group:  [facility]  Interpreting Physician:  Winsome Self DO    SUMMARY    LEFT VENTRICLE:  The ventricle was dilated  Systolic function was severely reduced  Ejection fraction was estimated in the range of 15 % to 20 %  There was severe diffuse hypokinesis with regional variations  Wall thickness was increased  Doppler parameters were consistent with high ventricular filling pressure  RIGHT VENTRICLE:  The ventricle was dilated  Systolic function was reduced  LEFT ATRIUM:  The atrium was moderately dilated  RIGHT ATRIUM:  The atrium was moderately dilated  MITRAL VALVE:  There was mild to moderate regurgitation  TRICUSPID VALVE:  There was moderate to severe regurgitation  Estimated peak PA pressure was 59 mmHg  IVC, HEPATIC VEINS:  The inferior vena cava was dilated, with poor inspiratory collapse, consistent with elevated right atrial pressure  PERICARDIUM:  A trace pericardial effusion was identified  HISTORY: PRIOR HISTORY: Congestive heart failure   Risk factors: hypertension, diabetes, morbid obesity, and a history of current cigarette use (within the last month)  PROCEDURE: The procedure was performed at the bedside  This was a routine study  The transthoracic approach was used  The study included complete 2D imaging, M-mode, complete spectral Doppler, and color Doppler  The heart rate was 98 bpm,  at the start of the study  Intravenous contrast (  4ml of Definity) was administered  Echocardiographic views were limited due to decreased penetration and lung interference  This was a technically difficult study  LEFT VENTRICLE: The ventricle was dilated  Systolic function was severely reduced  Ejection fraction was estimated in the range of 15 % to 20 %  There was severe diffuse hypokinesis with regional variations  Wall thickness was increased  DOPPLER: Left ventricular diastolic function parameters were abnormal  Doppler parameters were consistent with high ventricular filling pressure  RIGHT VENTRICLE: The ventricle was dilated  Systolic function was reduced  LEFT ATRIUM: The atrium was moderately dilated  RIGHT ATRIUM: The atrium was moderately dilated  MITRAL VALVE: Valve structure was normal  There was normal leaflet separation  DOPPLER: The transmitral velocity was within the normal range  There was no evidence for stenosis  There was mild to moderate regurgitation  AORTIC VALVE: The valve was trileaflet  Leaflets exhibited normal thickness and normal cuspal separation  DOPPLER: Transaortic velocity was within the normal range  There was no evidence for stenosis  There was no regurgitation  TRICUSPID VALVE: The valve structure was normal  There was normal leaflet separation  DOPPLER: The transtricuspid velocity was within the normal range  There was no evidence for stenosis  There was moderate to severe regurgitation  Estimated peak PA pressure was 59 mmHg  PULMONIC VALVE: Not well visualized  PERICARDIUM: A trace pericardial effusion was identified  AORTA: The root exhibited normal size      SYSTEMIC VEINS: IVC: The inferior vena cava was dilated, with poor inspiratory collapse, consistent with elevated right atrial pressure  SYSTEM MEASUREMENT TABLES    2D  %FS: 6 42 %  Ao Diam: 2 97 cm  EDV(Teich): 276 74 ml  EF(Teich): 13 89 %  ESV(Teich): 238 28 ml  IVSd: 0 88 cm  LA Diam: 5 2 cm  LAAs A2C: 31 47 cm2  LAAs A4C: 25 91 cm2  LAESV A-L A2C: 122 99 ml  LAESV A-L A4C: 87 16 ml  LAESV MOD A2C: 116 17 ml  LAESV MOD A4C: 80 62 ml  LAESV(A-L): 105 87 ml  LAESV(MOD BP): 98 9 ml  LALs A2C: 6 84 cm  LALs A4C: 6 54 cm  LVEDV MOD A4C: 219 42 ml  LVEF MOD A4C: 18 73 %  LVESV MOD A4C: 178 31 ml  LVIDd: 7 25 cm  LVIDs: 6 79 cm  LVLd A4C: 9 26 cm  LVLs A4C: 9 54 cm  LVPWd: 0 98 cm  RAAs: 28 13 cm2  RAESV A-L: 99 41 ml  RAESV MOD: 97 19 ml  RALs: 6 75 cm  RVIDd: 4 41 cm  SV MOD A4C: 41 11 ml  SV(Teich): 38 45 ml    CW  MR VTI: 131 46 cm  MR Vmax: 4 45 m/s  MR Vmean: 3 49 m/s  MR maxP 15 mmHg  MR meanP 35 mmHg  TR Vmax: 3 32 m/s  TR maxP 14 mmHg    MM  TAPSE: 1 37 cm    PW  MV A Sushil: 0 41 m/s  MV Dec Sharkey: 10 64 m/s2  MV DecT: 103 83 ms  MV E Sushil: 1 06 m/s  MV E/A Ratio: 2 8  MV PHT: 30 11 ms  MVA By PHT: 7 31 cm2    [employer] Accredited Echocardiography Laboratory    Prepared and electronically signed by    Virgilio Lacy DO  Signed 15-Aug-2020 15:10:27       No results found for this or any previous visit   --------------------------------------------------------------------------------  HOLTER  No results found for this or any previous visit   --------------------------------------------------------------------------------  CAROTIDS  No results found for this or any previous visit  [unfilled]   ======================================================          Review of Systems  ROS as noted above, otherwise 12 point review of systems was performed and is negative       Historical Information   Past Medical History:   Diagnosis Date    Asthma     Back pain     Cardiomyopathy     Carpal tunnel syndrome     Chronic combined systolic and diastolic CHF (congestive heart failure)     COPD (chronic obstructive pulmonary disease)     GERD (gastroesophageal reflux disease)     Hypertension     Knee pain     Neck pain      Past Surgical History:   Procedure Laterality Date    CARPAL TUNNEL RELEASE      HAND SURGERY       Social History     Substance and Sexual Activity   Alcohol Use Yes    Comment: occasional     Social History     Substance and Sexual Activity   Drug Use Yes    Types: Marijuana    Comment: periodically/on ocassion     Social History     Tobacco Use   Smoking Status Current Every Day Smoker    Packs/day: 0 50    Years: 36 00    Pack years: 18 00    Types: Cigarettes   Smokeless Tobacco Never Used     Family History   Problem Relation Age of Onset    Cancer Mother     Hypertension Mother     Hypertension Father     Gout Father     Heart attack Father     Heart disease Father     Lung cancer Sister        Meds/Allergies   Hospital Medications:   Current Facility-Administered Medications   Medication Dose Route Frequency    aspirin (ECOTRIN LOW STRENGTH) EC tablet 81 mg  81 mg Oral Daily    atorvastatin (LIPITOR) tablet 40 mg  40 mg Oral Daily With Dinner    bumetanide (BUMEX) tablet 0 5 mg  0 5 mg Oral BID    diclofenac sodium (VOLTAREN) 1 % topical gel 2 g  2 g Topical Daily    enoxaparin (LOVENOX) subcutaneous injection 40 mg  40 mg Subcutaneous Q12H JEANCARLOS    famotidine (PEPCID) tablet 20 mg  20 mg Oral Daily    insulin lispro (HumaLOG) 100 units/mL subcutaneous injection 1-6 Units  1-6 Units Subcutaneous TID AC    LORazepam (ATIVAN) tablet 0 5 mg  0 5 mg Oral Once    melatonin tablet 3 mg  3 mg Oral HS    metoprolol succinate (TOPROL-XL) 24 hr tablet 50 mg  50 mg Oral BID    potassium chloride (K-DUR,KLOR-CON) CR tablet 40 mEq  40 mEq Oral Daily    sacubitril-valsartan (ENTRESTO) 24-26 MG per tablet 1 tablet  1 tablet Oral BID    trimethobenzamide (TIGAN) IM injection 200 mg  200 mg Intramuscular Q6H PRN     Home Medications:   Medications Prior to Admission   Medication    cyclobenzaprine (FLEXERIL) 10 mg tablet    diclofenac (VOLTAREN) 75 mg EC tablet    irbesartan-hydrochlorothiazide (AVALIDE) 300-12 5 MG per tablet    meloxicam (MOBIC) 15 mg tablet    methocarbamol (ROBAXIN) 750 mg tablet    pregabalin (LYRICA) 200 MG capsule       No Known Allergies      Portions of the record may have been created with voice recognition software  Occasional wrong words or "sound a like" substitutions may have occurred due to the inherent limitations of voice recognition software  Read the chart carefully and recognize, using context, where substitutions have occurred

## 2020-08-20 NOTE — PROGRESS NOTES
INTERNAL MEDICINE RESIDENCY PROGRESS NOTE     Name: Halina Linda   Age & Sex: 62 y o  male   MRN: [id number]  Unit/Bed#: [facility] 529-01   Encounter: [id number]  Team: SOD Team B     PATIENT INFORMATION     Name: Halina Linda   Age & Sex: 62 y o  male   MRN: [id number]  Hospital Stay Days: 7    ASSESSMENT/PLAN     Principal Problem:    Acute combined systolic and diastolic congestive heart failure ([facility] [address] )  Active Problems:    Transaminitis    Sustained VT (ventricular tachycardia) (HCC)    Elevated troponin level not due myocardial infarction    Spondylolisthesis at L4-L5 level    Foraminal stenosis of lumbar region    Chronic obstructive lung disease (HCC)    Chronic pain    GERD (gastroesophageal reflux disease)    Morbid obesity ([facility] )    Essential hypertension    Tobacco use disorder    CKD (chronic kidney disease) stage 2, GFR 60-89 ml/min    Hypothyroidism (acquired)    Pre-diabetes    Hyperlipidemia    Vitamin D deficiency    Pericardial effusion      Acute cholecystitisresolved as of 8/17/2020  Assessment & Plan  · Findings of acute cholecystitis seen on CT abdomen   · Will empirically treat with ceftriaxone and flagyl as patient met SIRS Criteria  · HIDA scan negative     Transaminitis  Assessment & Plan  · Patient is being evaluated for abnormal liver chemistry  · The patient had acute Severe elevation of liver enzymes (>15x ULN), normal enzymes 2 days prior to elevation, currently it has improved significantly   · Jaundice present: no  · R- ratio suggests- Hepatocellular  · Signs of acute liver failure?- no  · Likely etiology includes: ischemic hepatitis given episode of hypotension after diuresis and improvement of liver enzymes significantly over the past few days   · USG with doppler negative   · Negative for acute viral hepatitis   · Discontinue hepatotoxic medications  · Avoid alcohol consumption   · Will stop trending liver enzymes due to significant improvement and good blood pressures · Can re-start statin on discharge     Acute respiratory failure with hypoxia (HCC)resolved as of 8/15/2020  Assessment & Plan  · Patient requiring 6LNC on presentation  · Likely etiology includes acute decompensated heart failure vs sepsis  · ABG was showing metabolic acidosis with respiratory compensation   · Patients source of sepsis would likely be cholecystitis  · Currently on empiric abx with ceftriaxone and flagyl  · Await blood cultures to return   · Currently seems to be doing much better from a breathing standpoint     * Acute combined systolic and diastolic congestive heart failure (HCC)  Assessment & Plan  Wt Readings from Last 3 Encounters:   08/12/20 (!) 154 kg (339 lb 1 1 oz)   06/29/20 (!) 143 kg (314 lb 2 5 oz)   10/28/19 (!) 153 kg (337 lb)     · Echo 06/26/2020-- EF of 15% and Grade 2 DD  · Mild to moderate pericardial effusion on CTA chest  · Patient is on 1mg of bumex daily, will be continued and appears euvolemic currently   · Making good urine  · Cardiac MRI read pending   · Also needs Lifevest prior to discharge     Sustained VT (ventricular tachycardia) ([facility] [address] )  Assessment & Plan  · Patient had 32 seconds run of V-tach per EMS EN route to [facility]  · Likely secondary to ischemic cardiomyopathy  · Recent echo 06/26/2020 showing an EF of 20-25% with diffuse hypokinesis  · Started on amiodarone at [facility], currently off drip  · Monitor electrolytes  · Cardiology following  · Monitor tele  · Needs Lifevest prior to discharge       Elevated troponin level not due myocardial infarction  Assessment & Plan  · Troponin peaked at 0 07 and then downtrended  · No ischemic changes on EKG and patient had recent cardiac cath   · Likely in the setting of Acute decompensated heart failure     Vitamin D deficiency  Assessment & Plan  · Vitamin-D level around 13  · Recommended vitamin-D supplementation/ dietary changes/sun exposure  · Follow-up outpatient    Hyperlipidemia  Assessment & Plan  · Patient not taking any medication  · started high-intensity statin    Pre-diabetes  Assessment & Plan  · Hemoglobin A1c 6 2  · Educated on weight loss/dietary modification  · Not currently on any medication    Hypothyroidism (acquired)  Assessment & Plan  · Patient with elevated TSH >7  · Free T4 normal  · Patient not taking any medication  · Follow-up outpatient PCP    CKD (chronic kidney disease) stage 2, GFR 60-89 ml/min  Assessment & Plan  · Creatinine 1 55 on presentation currently improved and back to baseline around 1 3-1 4  · Monitor BMP    Tobacco use disorder  Assessment & Plan  · Smoking cessation advised  · States he quit last month      Essential hypertension  Assessment & Plan  · Patient on home ARB-HCTZ 300-12 5 mg daily  · Will continue  · Monitor BMP, BP    Morbid obesity (HCC)  Assessment & Plan  · Advise Weight loss /bariatric surgery  · Follow-up palpation  · Educated on lifestyle modification      GERD (gastroesophageal reflux disease)  Assessment & Plan  · Not on home medications  · Follow up out pt pcp  · Avoid NSAIDs and triggering foods  · Weight loss advised        Disposition: Pending LifeVest and discharge      SUBJECTIVE     Patient seen and examined  No acute events overnight  Patient denies chest pain, palpitations, SOB, cough, abdominal pain, nausea, vomiting, constipation, diarrhea, fevers/chills, headaches, dysuria  OBJECTIVE     Vitals:    20 0207 20 0311 20 0730 20 1057   BP: 128/72  137/72 107/66   BP Location:   Right arm Right arm   Pulse: 101  98 95   Resp: 18  18 16   Temp: 97 5 °F (36 4 °C)  97 6 °F (36 4 °C) 98 °F (36 7 °C)   TempSrc:   Oral Oral   SpO2: 93%  96% 97%   Weight:  (!) 141 kg (310 lb 6 5 oz)     Height:          Temperature:   Temp (24hrs), Av 4 °F (36 3 °C), Min:96 5 °F (35 8 °C), Max:98 °F (36 7 °C)    Temperature: 98 °F (36 7 °C)  Intake & Output:  I/O       701 -  07 -  07 -  07    P  O  720 360     Total Intake(mL/kg) 720 (5 1) 360 (2 6)     Urine (mL/kg/hr) 2130 (0 6) 1350 (0 4) 300 (0 4)    Stool  0     Total Output 2130 1350 300    Net -1410 -990 -300           Unmeasured Stool Occurrence  1 x         Weights:   IBW: 71 85 kg    Body mass index is 45 84 kg/m²  Weight (last 2 days)     Date/Time   Weight    08/20/20 0311   (!) 141 (310 41)    08/19/20 0600   (!) 142 (312 83)    08/18/20 0600   (!) 144 (317 9)            Physical Exam:   GENERAL: NAD, morbidly obese  HEENT:  NC/AT, MMM, no scleral icterus  CARDIAC:  RRR, +S1/S2, no S3/S4 heard, no m/g/r  PULMONARY:  CTA B/L, no wheezing/rales/rhonci, non-labored breathing  ABDOMEN:  Soft, NT/ND, +BS, no rebound/guarding/rigidity  Extremities:  2+ Pulses in DP/PT  No edema, cyanosis, or clubbing  NEUROLOGIC:  Alert/oriented x3  SKIN:  No rashes or erythema    LABORATORY DATA     Labs: I have personally reviewed pertinent reports  Results from last 7 days   Lab Units 08/19/20  0641 08/18/20  0507 08/17/20  0522 08/15/20  0450   WBC Thousand/uL 10 78* 9 88 9 43 9 12   HEMOGLOBIN g/dL 15 9 15 3 14 9 14 4   HEMATOCRIT % 49 9* 47 8 46 7 44 2   PLATELETS Thousands/uL 284 259 259 265   NEUTROS PCT % 53 53  --  59   MONOS PCT % 9 10  --  11      Results from last 7 days   Lab Units 08/20/20  0418 08/19/20  0641 08/18/20  0507 08/17/20  0522   POTASSIUM mmol/L 4 7 4 4 4 2 4 0   CHLORIDE mmol/L 106 107 107 107   CO2 mmol/L 23 22 23 23   BUN mg/dL 18 16 16 16   CREATININE mg/dL 1 38* 1 17 1 02 1 17   CALCIUM mg/dL 9 0 8 5 8 4 8 6   ALK PHOS U/L  --  89 91 103   ALT U/L  --  359* 452* 674*   AST U/L  --  57* 107* 250*                  Results from last 7 days   Lab Units 08/13/20  2349   LACTIC ACID mmol/L 1 7           IMAGING & DIAGNOSTIC TESTING     Radiology Results: I have personally reviewed pertinent reports  Xr Chest Portable    Result Date: 8/13/2020  Impression: No acute cardiopulmonary disease   Workstation performed: [id number]     Nm Hepatobiliary    Result Date: 8/15/2020  Impression: There is visualization of the gallbladder indicating a patent cystic duct  Workstation performed: [id number]     Pe Study With Ct Abdomen And Pelvis With Contrast    Result Date: 8/13/2020  Impression: Extensive bilateral striated nephrograms concerning for pyelonephritis versus atypical infiltrating process  Recommend follow-up to resolution  Mild to moderate pericardial effusion  Gallbladder wall is thickened and enhancing concerning for cholecystitis  Workstation performed: [id number]     Us Right Upper Quadrant With Liver Dopplers    Result Date: 8/16/2020  Impression: 1  Mild hepatomegaly with diffuse increased hepatic echotexture consistent with steatosis and/or fibrosis  2   Slightly contracted gallbladder, likely related to the patient's non-NPO state  3   No evidence for cholecystitis, cholelithiasis, sonographic Hdz sign, pericholecystic fluid, choledocholithiasis or biliary obstruction  4   Normal portal venous and hepatic venous waveforms  Hepatic artery is not imaged, likely related to technical limitations of the study    Workstation performed: [id number]     Other Diagnostic Testing: I have personally reviewed pertinent reports      ACTIVE MEDICATIONS     Current Facility-Administered Medications   Medication Dose Route Frequency    aspirin (ECOTRIN LOW STRENGTH) EC tablet 81 mg  81 mg Oral Daily    atorvastatin (LIPITOR) tablet 40 mg  40 mg Oral Daily With Dinner    bumetanide (BUMEX) tablet 0 5 mg  0 5 mg Oral BID    diclofenac sodium (VOLTAREN) 1 % topical gel 2 g  2 g Topical Daily    enoxaparin (LOVENOX) subcutaneous injection 40 mg  40 mg Subcutaneous Q12H JEANCARLOS    famotidine (PEPCID) tablet 20 mg  20 mg Oral Daily    insulin lispro (HumaLOG) 100 units/mL subcutaneous injection 1-6 Units  1-6 Units Subcutaneous TID AC    LORazepam (ATIVAN) tablet 0 5 mg  0 5 mg Oral Once    melatonin tablet 3 mg  3 mg Oral HS    metoprolol succinate (TOPROL-XL) 24 hr tablet 50 mg  50 mg Oral BID    potassium chloride (K-DUR,KLOR-CON) CR tablet 40 mEq  40 mEq Oral Daily    sacubitril-valsartan (ENTRESTO) 24-26 MG per tablet 1 tablet  1 tablet Oral BID    trimethobenzamide (TIGAN) IM injection 200 mg  200 mg Intramuscular Q6H PRN       VTE Pharmacologic Prophylaxis: Enoxaparin (Lovenox)  VTE Mechanical Prophylaxis: sequential compression device    Portions of the record may have been created with voice recognition software  Occasional wrong word or "sound a like" substitutions may have occurred due to the inherent limitations of voice recognition software    Read the chart carefully and recognize, using context, where substitutions have occurred   ==  Katie Monte MD  [address]  Internal Medicine Residency PGY-3

## 2020-08-20 NOTE — PLAN OF CARE
Problem: Potential for Falls  Goal: Patient will remain free of falls  Description: INTERVENTIONS:  - Assess patient frequently for physical needs  -  Identify cognitive and physical deficits and behaviors that affect risk of falls    -  [place] fall precautions as indicated by assessment   - Educate patient/family on patient safety including physical limitations  - Instruct patient to call for assistance with activity based on assessment  - Modify environment to reduce risk of injury  - Consider OT/PT consult to assist with strengthening/mobility  Outcome: Progressing     Problem: CARDIOVASCULAR - ADULT  Goal: Maintains optimal cardiac output and hemodynamic stability  Description: INTERVENTIONS:  - Monitor I/O, vital signs and rhythm  - Monitor for S/S and trends of decreased cardiac output  - Administer and titrate ordered vasoactive medications to optimize hemodynamic stability  - Assess quality of pulses, skin color and temperature  - Assess for signs of decreased coronary artery perfusion  - Instruct patient to report change in severity of symptoms  Outcome: Progressing  Goal: Absence of cardiac dysrhythmias or at baseline rhythm  Description: INTERVENTIONS:  - Continuous cardiac monitoring, vital signs, obtain 12 lead EKG if ordered  - Administer antiarrhythmic and heart rate control medications as ordered  - Monitor electrolytes and administer replacement therapy as ordered  Outcome: Progressing     Problem: PAIN - ADULT  Goal: Verbalizes/displays adequate comfort level or baseline comfort level  Description: Interventions:  - Encourage patient to monitor pain and request assistance  - Assess pain using appropriate pain scale  - Administer analgesics based on type and severity of pain and evaluate response  - Implement non-pharmacological measures as appropriate and evaluate response  - Consider cultural and social influences on pain and pain management  - Notify physician/advanced practitioner if interventions unsuccessful or patient reports new pain  Outcome: Progressing     Problem: INFECTION - ADULT  Goal: Absence or prevention of progression during hospitalization  Description: INTERVENTIONS:  - Assess and monitor for signs and symptoms of infection  - Monitor lab/diagnostic results  - Monitor all insertion sites, i e  indwelling lines, tubes, and drains  - [place] appropriate cooling/warming therapies per order  - Administer medications as ordered  - Instruct and encourage patient and family to use good hand hygiene technique  - Identify and instruct in appropriate isolation precautions for identified infection/condition  Outcome: Progressing  Goal: Absence of fever/infection during neutropenic period  Description: INTERVENTIONS:  - Monitor WBC    Outcome: Progressing     Problem: SAFETY ADULT  Goal: Patient will remain free of falls  Description: INTERVENTIONS:  - Assess patient frequently for physical needs  -  Identify cognitive and physical deficits and behaviors that affect risk of falls    -  [place] fall precautions as indicated by assessment   - Educate patient/family on patient safety including physical limitations  - Instruct patient to call for assistance with activity based on assessment  - Modify environment to reduce risk of injury  - Consider OT/PT consult to assist with strengthening/mobility  Outcome: Progressing  Goal: Maintain or return to baseline ADL function  Description: INTERVENTIONS:  -  Assess patient's ability to carry out ADLs; assess patient's baseline for ADL function and identify physical deficits which impact ability to perform ADLs (bathing, care of mouth/teeth, toileting, grooming, dressing, etc )  - Assess/evaluate cause of self-care deficits   - Assess range of motion  - Assess patient's mobility; develop plan if impaired  - Assess patient's need for assistive devices and provide as appropriate  - Encourage maximum independence but intervene and supervise when necessary  - Involve family in performance of ADLs  - Assess for home care needs following discharge   - Consider OT consult to assist with ADL evaluation and planning for discharge  - Provide patient education as appropriate  Outcome: Progressing  Goal: Maintain or return mobility status to optimal level  Description: INTERVENTIONS:  - Assess patient's baseline mobility status (ambulation, transfers, stairs, etc )    - Identify cognitive and physical deficits and behaviors that affect mobility  - Identify mobility aids required to assist with transfers and/or ambulation (gait belt, sit-to-stand, lift, walker, cane, etc )  - [place] fall precautions as indicated by assessment  - Record patient progress and toleration of activity level on Mobility SBAR; progress patient to next Phase/Stage  - Instruct patient to call for assistance with activity based on assessment  - Consider rehabilitation consult to assist with strengthening/weightbearing, etc   Outcome: Progressing     Problem: DISCHARGE PLANNING  Goal: Discharge to home or other facility with appropriate resources  Description: INTERVENTIONS:  - Identify barriers to discharge w/patient and caregiver  - Arrange for needed discharge resources and transportation as appropriate  - Identify discharge learning needs (meds, wound care, etc )  - Arrange for interpretive services to assist at discharge as needed  - Refer to Case Management Department for coordinating discharge planning if the patient needs post-hospital services based on physician/advanced practitioner order or complex needs related to functional status, cognitive ability, or social support system  Outcome: Progressing     Problem: Knowledge Deficit  Goal: Patient/family/caregiver demonstrates understanding of disease process, treatment plan, medications, and discharge instructions  Description: Complete learning assessment and assess knowledge base    Interventions:  - Provide teaching at level of understanding  - Provide teaching via preferred learning methods  Outcome: Progressing

## 2020-08-20 NOTE — SOCIAL WORK
Cm received call from [place] at [facility], results of pt's cardiac MRI is needed for approval of the [facility]  Cm to fax results as soon as MRI is read

## 2020-08-21 VITALS
SYSTOLIC BLOOD PRESSURE: 122 MMHG | RESPIRATION RATE: 18 BRPM | HEIGHT: 70 IN | OXYGEN SATURATION: 98 % | HEART RATE: 110 BPM | TEMPERATURE: 98.5 F | DIASTOLIC BLOOD PRESSURE: 91 MMHG | WEIGHT: 310.41 LBS | BODY MASS INDEX: 44.44 KG/M2

## 2020-08-21 LAB
ALBUMIN SERPL BCP-MCNC: 3.3 G/DL (ref 3.5–5)
ALP SERPL-CCNC: 130 U/L (ref 46–116)
ALT SERPL W P-5'-P-CCNC: 214 U/L (ref 12–78)
ANION GAP SERPL CALCULATED.3IONS-SCNC: 7 MMOL/L (ref 4–13)
AST SERPL W P-5'-P-CCNC: 33 U/L (ref 5–45)
BILIRUB SERPL-MCNC: 0.63 MG/DL (ref 0.2–1)
BUN SERPL-MCNC: 22 MG/DL (ref 5–25)
CALCIUM SERPL-MCNC: 8.9 MG/DL (ref 8.3–10.1)
CHLORIDE SERPL-SCNC: 106 MMOL/L (ref 100–108)
CO2 SERPL-SCNC: 24 MMOL/L (ref 21–32)
CREAT SERPL-MCNC: 1.44 MG/DL (ref 0.6–1.3)
GFR SERPL CREATININE-BSD FRML MDRD: 62 ML/MIN/1.73SQ M
GLUCOSE SERPL-MCNC: 106 MG/DL (ref 65–140)
GLUCOSE SERPL-MCNC: 113 MG/DL (ref 65–140)
GLUCOSE SERPL-MCNC: 124 MG/DL (ref 65–140)
GLUCOSE SERPL-MCNC: 97 MG/DL (ref 65–140)
POTASSIUM SERPL-SCNC: 4.6 MMOL/L (ref 3.5–5.3)
PROT SERPL-MCNC: 8.3 G/DL (ref 6.4–8.2)
SODIUM SERPL-SCNC: 137 MMOL/L (ref 136–145)

## 2020-08-21 PROCEDURE — 82948 REAGENT STRIP/BLOOD GLUCOSE: CPT

## 2020-08-21 PROCEDURE — 80053 COMPREHEN METABOLIC PANEL: CPT | Performed by: INTERNAL MEDICINE

## 2020-08-21 PROCEDURE — 99232 SBSQ HOSP IP/OBS MODERATE 35: CPT | Performed by: INTERNAL MEDICINE

## 2020-08-21 RX ORDER — ATORVASTATIN CALCIUM 40 MG/1
40 TABLET, FILM COATED ORAL
Qty: 30 TABLET | Refills: 0 | Status: SHIPPED | OUTPATIENT
Start: 2020-08-21

## 2020-08-21 RX ORDER — FAMOTIDINE 20 MG/1
20 TABLET, FILM COATED ORAL DAILY
Qty: 30 TABLET | Refills: 0 | Status: SHIPPED | OUTPATIENT
Start: 2020-08-21

## 2020-08-21 RX ORDER — BUMETANIDE 0.5 MG/1
0.5 TABLET ORAL 2 TIMES DAILY
Qty: 60 TABLET | Refills: 0 | Status: SHIPPED | OUTPATIENT
Start: 2020-08-21

## 2020-08-21 RX ORDER — ALBUTEROL SULFATE 90 UG/1
2 AEROSOL, METERED RESPIRATORY (INHALATION) EVERY 6 HOURS PRN
Qty: 8.5 G | Refills: 0 | Status: SHIPPED | OUTPATIENT
Start: 2020-08-21 | End: 2020-09-14

## 2020-08-21 RX ORDER — ASPIRIN 81 MG/1
81 TABLET ORAL DAILY
Qty: 30 TABLET | Refills: 0 | Status: SHIPPED | OUTPATIENT
Start: 2020-08-21 | End: 2020-12-21

## 2020-08-21 RX ORDER — METOPROLOL SUCCINATE 50 MG/1
50 TABLET, EXTENDED RELEASE ORAL DAILY
Qty: 30 TABLET | Refills: 0 | Status: SHIPPED | OUTPATIENT
Start: 2020-08-21

## 2020-08-21 RX ADMIN — METOPROLOL SUCCINATE 50 MG: 50 TABLET, EXTENDED RELEASE ORAL at 16:36

## 2020-08-21 RX ADMIN — SACUBITRIL AND VALSARTAN 1 TABLET: 24; 26 TABLET, FILM COATED ORAL at 16:36

## 2020-08-21 RX ADMIN — DICLOFENAC SODIUM 2 G: 10 GEL TOPICAL at 12:47

## 2020-08-21 RX ADMIN — ASPIRIN 81 MG: 81 TABLET, COATED ORAL at 08:38

## 2020-08-21 RX ADMIN — ATORVASTATIN CALCIUM 40 MG: 40 TABLET, FILM COATED ORAL at 16:36

## 2020-08-21 RX ADMIN — POTASSIUM CHLORIDE 40 MEQ: 1500 TABLET, EXTENDED RELEASE ORAL at 08:38

## 2020-08-21 RX ADMIN — METOPROLOL SUCCINATE 50 MG: 50 TABLET, EXTENDED RELEASE ORAL at 08:38

## 2020-08-21 RX ADMIN — BUMETANIDE 0.5 MG: 1 TABLET ORAL at 16:35

## 2020-08-21 RX ADMIN — BUMETANIDE 0.5 MG: 1 TABLET ORAL at 08:38

## 2020-08-21 RX ADMIN — ENOXAPARIN SODIUM 40 MG: 40 INJECTION SUBCUTANEOUS at 08:38

## 2020-08-21 RX ADMIN — SACUBITRIL AND VALSARTAN 1 TABLET: 24; 26 TABLET, FILM COATED ORAL at 08:38

## 2020-08-21 RX ADMIN — FAMOTIDINE 20 MG: 20 TABLET ORAL at 08:38

## 2020-08-21 NOTE — SOCIAL WORK
Cardiac MRI report faxed to alex [place] called francis, once approved a fitter can be out by 5pm today

## 2020-08-21 NOTE — PLAN OF CARE
Problem: Potential for Falls  Goal: Patient will remain free of falls  Description: INTERVENTIONS:  - Assess patient frequently for physical needs  -  Identify cognitive and physical deficits and behaviors that affect risk of falls    -  [place] fall precautions as indicated by assessment   - Educate patient/family on patient safety including physical limitations  - Instruct patient to call for assistance with activity based on assessment  - Modify environment to reduce risk of injury  - Consider OT/PT consult to assist with strengthening/mobility  Outcome: Progressing     Problem: CARDIOVASCULAR - ADULT  Goal: Maintains optimal cardiac output and hemodynamic stability  Description: INTERVENTIONS:  - Monitor I/O, vital signs and rhythm  - Monitor for S/S and trends of decreased cardiac output  - Administer and titrate ordered vasoactive medications to optimize hemodynamic stability  - Assess quality of pulses, skin color and temperature  - Assess for signs of decreased coronary artery perfusion  - Instruct patient to report change in severity of symptoms  Outcome: Progressing  Goal: Absence of cardiac dysrhythmias or at baseline rhythm  Description: INTERVENTIONS:  - Continuous cardiac monitoring, vital signs, obtain 12 lead EKG if ordered  - Administer antiarrhythmic and heart rate control medications as ordered  - Monitor electrolytes and administer replacement therapy as ordered  Outcome: Progressing     Problem: PAIN - ADULT  Goal: Verbalizes/displays adequate comfort level or baseline comfort level  Description: Interventions:  - Encourage patient to monitor pain and request assistance  - Assess pain using appropriate pain scale  - Administer analgesics based on type and severity of pain and evaluate response  - Implement non-pharmacological measures as appropriate and evaluate response  - Consider cultural and social influences on pain and pain management  - Notify physician/advanced practitioner if interventions unsuccessful or patient reports new pain  Outcome: Progressing     Problem: INFECTION - ADULT  Goal: Absence or prevention of progression during hospitalization  Description: INTERVENTIONS:  - Assess and monitor for signs and symptoms of infection  - Monitor lab/diagnostic results  - Monitor all insertion sites, i e  indwelling lines, tubes, and drains  - [place] appropriate cooling/warming therapies per order  - Administer medications as ordered  - Instruct and encourage patient and family to use good hand hygiene technique  - Identify and instruct in appropriate isolation precautions for identified infection/condition  Outcome: Progressing  Goal: Absence of fever/infection during neutropenic period  Description: INTERVENTIONS:  - Monitor WBC    Outcome: Progressing     Problem: SAFETY ADULT  Goal: Patient will remain free of falls  Description: INTERVENTIONS:  - Assess patient frequently for physical needs  -  Identify cognitive and physical deficits and behaviors that affect risk of falls    -  [place] fall precautions as indicated by assessment   - Educate patient/family on patient safety including physical limitations  - Instruct patient to call for assistance with activity based on assessment  - Modify environment to reduce risk of injury  - Consider OT/PT consult to assist with strengthening/mobility  Outcome: Progressing  Goal: Maintain or return to baseline ADL function  Description: INTERVENTIONS:  -  Assess patient's ability to carry out ADLs; assess patient's baseline for ADL function and identify physical deficits which impact ability to perform ADLs (bathing, care of mouth/teeth, toileting, grooming, dressing, etc )  - Assess/evaluate cause of self-care deficits   - Assess range of motion  - Assess patient's mobility; develop plan if impaired  - Assess patient's need for assistive devices and provide as appropriate  - Encourage maximum independence but intervene and supervise when necessary  - Involve family in performance of ADLs  - Assess for home care needs following discharge   - Consider OT consult to assist with ADL evaluation and planning for discharge  - Provide patient education as appropriate  Outcome: Progressing  Goal: Maintain or return mobility status to optimal level  Description: INTERVENTIONS:  - Assess patient's baseline mobility status (ambulation, transfers, stairs, etc )    - Identify cognitive and physical deficits and behaviors that affect mobility  - Identify mobility aids required to assist with transfers and/or ambulation (gait belt, sit-to-stand, lift, walker, cane, etc )  - [place] fall precautions as indicated by assessment  - Record patient progress and toleration of activity level on Mobility SBAR; progress patient to next Phase/Stage  - Instruct patient to call for assistance with activity based on assessment  - Consider rehabilitation consult to assist with strengthening/weightbearing, etc   Outcome: Progressing     Problem: DISCHARGE PLANNING  Goal: Discharge to home or other facility with appropriate resources  Description: INTERVENTIONS:  - Identify barriers to discharge w/patient and caregiver  - Arrange for needed discharge resources and transportation as appropriate  - Identify discharge learning needs (meds, wound care, etc )  - Arrange for interpretive services to assist at discharge as needed  - Refer to Case Management Department for coordinating discharge planning if the patient needs post-hospital services based on physician/advanced practitioner order or complex needs related to functional status, cognitive ability, or social support system  Outcome: Progressing     Problem: Knowledge Deficit  Goal: Patient/family/caregiver demonstrates understanding of disease process, treatment plan, medications, and discharge instructions  Description: Complete learning assessment and assess knowledge base    Interventions:  - Provide teaching at level of understanding  - Provide teaching via preferred learning methods  Outcome: Progressing

## 2020-08-21 NOTE — PROGRESS NOTES
Progress Note - Cardiology   Flor Bridges 62 y o  male MRN: [id number]  [facility] Encounter: [id number]    Assessment / Plan    62 y  o  male with PMHx of CAD s/p PCI 2008, HFrEF (<20% EF 7/20), EUNICE?, HTN, HLD, T2DM, GERD, CKD II, Tobacco use, recent multiple hospitalization for CHF exacerbation, who is a transfer from [facility] for cardiac evaluation        1) Acute Decompensated Heart Failure / Possible Sepsis           08/20/20          141 kg (310 lb)       08/19/20          142 kg (312 lb)       08/18/20          144 kg       08/17/20          146 kg (322 lb        08/14/20          146 kg (322Lbs)   08/12/20 (!) 154 kg (339 lb 1 1 oz)   06/29/20 (!) 143 kg (314 lb 2 5 oz)   10/28/19 (!) 153 kg (337 lb)      -Multiple recent hospitalization for HF exacerbation  -Recent echo 08/14/2020 showing an EF of 15-20% with diffuse hypokinesis, moderate biatrial enlargement, SPAP 59, mild to mod MR  -Respiratory distress w/ increase work of breathing, O2 sat 96% on 5 L NC, w/ b/l crackles in lung field and JVD  -Rapid was called to evaluate the need for BiPAP and increase the level of care   -8/13 WBC of 20K, Abnormal ABG  PH 7 398, PCO2 24 5, HCO3 15 1  -Corona Negative  -UA w/ Protein, negative for bacteria, WBC, Nitrites  -8/14-Hepatic Function Panel- AST/ALT  4984/6410, Albumin 3 1, T-Bili 1 99- Direct 0 77  -8/21-   -Cardiac MRI- EF of 12%, RV & LV dilated, Global Hypokinesis w/ akinesis of interventricular septum  -Transaminitis Resolving- AST  ALT   -Urine Output IN 8284II,-YLNZI admit, since yday 1435cc  -Cr 1 38-->1 44  -GDMT-  -Started Toprol XL 50mg BID, changed Bumex to 0 5 mg PO BID, Entresto 24-26   -LifeVest at D/C   -Monitor Ins/Out  -Salt and fluid restriction        2) Sustained V-tach Episode     -Patient had a 32 seconds run of V-tach per EMS EN route to [address]  -Recent echo 06/26/2020 showing an EF of 20-25% with diffuse hypokinesis  -Started on amiodarone at [facility]   -EKG at [facility]- NSR, LAE, Prolonged QT  -8/13 Single Episode of Non Sustained V-Tach, resolved spontaneously  -8/18- 13 beats of V-Tach on tele overnight  -8/21- TELE             Most Recent Potassium of 4 6  -Life Vest at D/C  -Monitor electrolytes  -Monitor telemetry     3) Elevated Troponins     -7/20 [facility] from [facility] LAD 30%, 2nd Marginal 30% and Prox RCA 50% Stenosis  Recommended Medical Management  -presented to the ED w/ chest tightness  -Troponin elevation 0 07--> 04---> 10  -EKG- no ischemic changes, continue trend  -Troponemia likely due to acute decompensated HF      4) Coronary Artery Disease  -PCI 2008  --7/20 [facility] from [facility] LAD 30%, 2nd Marginal 30% and Prox RCA 50% Stenosis  Recommended Medical Management  -ASA 81mg, Lipitor 40mg      5) Essential Hypertension     -Presented to ED w/ Elevated blood pressure  -BP stable  -on Entresto, Toprol XL 50 BID, Bumex 0 5 mg PO BID    Subjective/Objective   Chief Complaint: SOB    Subjective: Pt denies any chest pain, sob, palpitations, n/v, fever/chills   Very motivated to improve his health    Objective:    Vitals: /68 (BP Location: Right arm)   Pulse 91   Temp 98 1 °F (36 7 °C) (Oral)   Resp 16   Ht 5' 9 5" (1 765 m)   Wt (!) 141 kg (310 lb 6 5 oz)   SpO2 100%   BMI 45 84 kg/m²   Vitals:    08/19/20 0600 08/20/20 0311   Weight: (!) 142 kg (312 lb 13 3 oz) (!) 141 kg (310 lb 6 5 oz)     Orthostatic Blood Pressures      Most Recent Value   Blood Pressure  105/68 filed at 08/20/2020 2355   Patient Position - Orthostatic VS  Lying filed at 08/20/2020 2355            Intake/Output Summary (Last 24 hours) at 8/21/2020 0740  Last data filed at 8/21/2020 0617  Gross per 24 hour   Intake 240 ml   Output 1375 ml   Net -1135 ml       Invasive Devices     Peripheral Intravenous Line            Peripheral IV 08/19/20 Left;Proximal;Ventral (anterior) Forearm 1 day                Review of Systems: General ROS: negative  Respiratory ROS: no cough, shortness of breath, or wheezing  Cardiovascular ROS: no chest pain or dyspnea on exertion  Musculoskeletal ROS: no swelling    Physical Exam  Constitutional:       General: He is not in acute distress  Appearance: Normal appearance  He is obese  He is not ill-appearing, toxic-appearing or diaphoretic  HENT:      Head: Normocephalic and atraumatic  Cardiovascular:      Rate and Rhythm: Normal rate  Pulses: Normal pulses  Heart sounds: Normal heart sounds  No murmur  No friction rub  No gallop  Pulmonary:      Effort: Pulmonary effort is normal  No respiratory distress  Breath sounds: Normal breath sounds  No wheezing or rales  Musculoskeletal:      Right lower leg: Edema (trace) present  Left lower leg: Edema (trace) present  Neurological:      Mental Status: He is alert           Lab Results:   CBC with diff:   Results from last 7 days   Lab Units 08/19/20  0641   WBC Thousand/uL 10 78*   RBC Million/uL 5 56   HEMOGLOBIN g/dL 15 9   HEMATOCRIT % 49 9*   MCV fL 90   MCH pg 28 6   MCHC g/dL 31 9   RDW % 16 0*   MPV fL 10 3   PLATELETS Thousands/uL 284     CMP:   Results from last 7 days   Lab Units 08/21/20  0532   SODIUM mmol/L 137   POTASSIUM mmol/L 4 6   CHLORIDE mmol/L 106   CO2 mmol/L 24   BUN mg/dL 22   CREATININE mg/dL 1 44*   CALCIUM mg/dL 8 9   AST U/L 33   ALT U/L 214*   ALK PHOS U/L 130*   EGFR ml/min/1 73sq m 62     Troponin:   0   Lab Value Date/Time    TROPONINI 0 10 (H) 08/13/2020 0953    TROPONINI 0 04 08/13/2020 0358    TROPONINI 0 07 (H) 08/12/2020 2251    TROPONINI 0 09 (H) 06/26/2020 1434    TROPONINI 0 09 (H) 06/26/2020 0816     BNP:   Results from last 7 days   Lab Units 08/21/20  0532   POTASSIUM mmol/L 4 6   CHLORIDE mmol/L 106   CO2 mmol/L 24   BUN mg/dL 22   CREATININE mg/dL 1 44*   CALCIUM mg/dL 8 9   EGFR ml/min/1 73sq m 62     Coags:     TSH:     Magnesium:     Lipid Profile:     Imaging: I have personally reviewed pertinent films in PACS and I have personally reviewed pertinent films in PACS with a Radiologist   EKG:   VTE Pharmacologic Prophylaxis: Enoxaparin (Lovenox)  VTE Mechanical Prophylaxis: reason for no mechanical VTE prophylaxis lovenox    Counseling / Coordination of Care  Total time spent today 30 minutes  Greater than 50% of total time was spent with the patient and / or family counseling and / or coordination of care  A description of the counseling / coordination of care: Keegan Ayala

## 2020-08-21 NOTE — DISCHARGE SUMMARY
[address] [facility],[address] Floor SUMMARY    Admitting Provider: Burke Montaño MD  Discharge Provider: Burke Montaño MD  Primary Care Physician at Discharge: Needs to establish    Discharge To: Home  Admission Date: 8/13/2020     Discharge Date: 08/21/2020  Primary Discharge Diagnosis  Principal Problem:    Acute combined systolic and diastolic congestive heart failure (HCC)  Active Problems:    Transaminitis    Sustained VT (ventricular tachycardia) (HCC)    Elevated troponin level not due myocardial infarction    Spondylolisthesis at L4-L5 level    Foraminal stenosis of lumbar region    Chronic obstructive lung disease (HCC)    Chronic pain    GERD (gastroesophageal reflux disease)    Morbid obesity ([address] )    Essential hypertension    Tobacco use disorder    CKD (chronic kidney disease) stage 2, GFR 60-89 ml/min    Hypothyroidism (acquired)    Pre-diabetes    Hyperlipidemia    Vitamin D deficiency    Pericardial effusion  Resolved Problems:    Acute cholecystitis    Acute respiratory failure with hypoxia [facility])      PATIENT INFORMATION      Patient: Scot Garza 62 y o  male   MRN: [id number]  PCP: Nena primary care provider on file    Unit/Bed#: [facility] 529-01 Encounter: [id number]  Date Of Visit: 08/21/20  Current Length of Stay: 8 day(s)     ASSESSMENT & PLAN          See previous progress note for further assessment and plans            [facility] course     Patient is a 78-year-old morbidly obese male with significant past medical history of CAD s/p PCI 3377, combined systolic and diastolic heart failure EF of 20-25% (6/26/20), hypertension, hyperlipidemia, type 2 diabetes, lumbar stenosis with radiculopathy and chronic pain, GERD, tobacco abuse, hx of noncompliance who presents to the ED at [facility] complaining of progressive dyspnea over the last month along with chest tightness and some lower extremity edema        At [facility],  Patient was noted to have V-tach by EMS for 32 seconds  Patient was started on amiodarone  Initial workup showing troponin elevation of 0 07, no EKG changes consistent with ischemia  D-dimer elevated 1 53, CT a chest negative for PE however findings of gallbladder wall thickening concerning for cholecystitis as well as bilateral striated nephrograms concerning for pyelonephritis versus atypical infiltrating process  Mild-to-moderate pericardial effusion also noted  Patient does not have any complaints of flank or right upper quadrant pain  Denies dysuria/ frequency  Due to the above findings, patient was not started on heparin drip  He was transferred to [facility] it was noted that patient had a recent cardiac catheterization at [facility] without any stenting  He was hypoxic and diuresed with iv lasix and switched to oral bumex  Thersa Schlichter was added to his regimen  ECHO showed an EF of 15% and a cardiac MRI was obtained which showed an EF of 12% and findings suggestive of non-ischemic cardiomyopathy  He did have ischemic hepatitis from being hypotensive transiently after diuresis but his enzymes improved significantly over the past few days  He also had CT findings suggesting cholecystitis but HIDA scan was negative  Patient was on antibiotics empirically for 3 days that was then discontinued after negative HIDA  Patient will be fitted for a Life Vest today and then be discharged home in a stable condition with referral to cardiac rehab and outpatient cardiology follow-up  Aldactone will be considered as an outpatient  He does have CKD 2 with a baseline creatinine of 1 2-1 3  Currently it is 1 4 so he will repeat a BMP as an outpatient and follow-up with cardiology  Miscellaneous     Consulting Providers   Cardiology  Therapeutic Operative Procedures Performed  None     Diagnostic Procedures Performed  Xr Chest Portable    Result Date: 8/13/2020  Impression: No acute cardiopulmonary disease   Workstation performed: [id number]     Nm Hepatobiliary    Result Date: 8/15/2020  Impression: There is visualization of the gallbladder indicating a patent cystic duct  Workstation performed: [id number]     Mri Cardiac  W Wo Contrast    Result Date: 8/20/2020  Impression: Impression: 1  Moderately dilated left ventricle with severely reduced left ventricular systolic function  2  Mildly dilated right ventricle with moderately reduced right ventricular systolic function  3  No significant valvular abnormalities  4  Small circumferential pericardial effusion  5  Patchy intramyocardial hyperenhancement of the basal to mid interventricular septum  This is most suggestive of an idiopathic, non-ischemic (i e  viral myocarditis) cardiomyopathy  Workstation performed: [id number]     Pe Study With Ct Abdomen And Pelvis With Contrast    Result Date: 8/13/2020  Impression: Extensive bilateral striated nephrograms concerning for pyelonephritis versus atypical infiltrating process  Recommend follow-up to resolution  Mild to moderate pericardial effusion  Gallbladder wall is thickened and enhancing concerning for cholecystitis  Workstation performed: [id number]     Us Right Upper Quadrant With Liver Dopplers    Result Date: 8/16/2020  Impression: 1  Mild hepatomegaly with diffuse increased hepatic echotexture consistent with steatosis and/or fibrosis  2   Slightly contracted gallbladder, likely related to the patient's non-NPO state  3   No evidence for cholecystitis, cholelithiasis, sonographic Hdz sign, pericholecystic fluid, choledocholithiasis or biliary obstruction  4   Normal portal venous and hepatic venous waveforms  Hepatic artery is not imaged, likely related to technical limitations of the study    Workstation performed: [id number]       Discharged With Lines: No  Test Results Pending at Discharge: None    Outpatient Follow-Up  Cardiology    Code Status: Level 1 - Full Code    Medications   See after visit summary for reconciled discharge medications provided to patient and family  Current Outpatient Medications   Medication Instructions    albuterol (ProAir HFA) 90 mcg/act inhaler 2 puffs, Inhalation, Every 6 hours PRN    aspirin (ECOTRIN LOW STRENGTH) 81 mg, Oral, Daily    atorvastatin (LIPITOR) 40 mg, Oral, Daily with dinner    bumetanide (BUMEX) 0 5 mg, Oral, 2 times daily    cyclobenzaprine (FLEXERIL) 10 mg, Oral, 2 times daily PRN    diclofenac (VOLTAREN) 75 mg, Oral, 2 times daily    diclofenac sodium (VOLTAREN) 2 g, Topical, Daily    famotidine (PEPCID) 20 mg, Oral, Daily    irbesartan-hydrochlorothiazide (AVALIDE) 300-12 5 MG per tablet irbesartan 300 mg-hydrochlorothiazide 12 5 mg tablet    meloxicam (MOBIC) 15 mg tablet meloxicam 15 mg tablet    methocarbamol (ROBAXIN) 750 mg, Oral, 3 times daily PRN    metoprolol succinate (TOPROL-XL) 50 mg, Oral, Daily    pregabalin (LYRICA) 200 mg, Oral, Daily    sacubitril-valsartan (Entresto) 24-26 MG TABS 1 tablet, Oral, 2 times daily    sacubitril-valsartan (ENTRESTO) 24-26 MG TABS 1 tablet, Oral, 2 times daily       Allergies  No Known Allergies  Discharge Diet: cardiac diet  Activity restrictions: none    Vitals:    08/21/20 0835   BP: 110/70   Pulse: 102   Resp:    Temp: 98 °F (36 7 °C)   SpO2: 98%         Physical Exam:   GENERAL: Patient is non toxic appearing, morbidly obese   HEENT:  Atraumatic, Pupils are bilaterally equal and reactive to light  CARDIAC:  Regular rate and rhythm, S1 S2 heard, no murmurs heard  PULMONARY:  Normal vesicular breath sounds, no added sounds  ABDOMEN:  Abdomen is soft on palpation, non tender, bowel sounds heard and no free fluid on percussion  Extremities:  2+ Pulses in DP/PT  No edema, cyanosis, or clubbing  NEUROLOGIC:  Alert/oriented x3  No motor or sensory deficits  SKIN:  No rashes or erythema        Discharge Condition: Stable      Discharge  Statement   I spent 30 minutes minutes discharging the patient   This time was spent on the day of discharge  I had direct contact with the patient on the day of discharge  Additional documentation is required if more than 30 minutes were spent on discharge       Mary Ruvalcaba MD  Resident physician,  Internal Medicine

## 2020-08-24 ENCOUNTER — EPISODE CHANGES (OUTPATIENT)
Dept: CASE MANAGEMENT | Facility: HOSPITAL | Age: 55
End: 2020-08-24

## 2020-08-24 ENCOUNTER — EPISODE CHANGES (OUTPATIENT)
Dept: CASE MANAGEMENT | Facility: OTHER | Age: 55
End: 2020-08-24

## 2020-08-25 ENCOUNTER — PATIENT OUTREACH (OUTPATIENT)
Dept: CASE MANAGEMENT | Facility: OTHER | Age: 55
End: 2020-08-25

## 2020-08-25 NOTE — PROGRESS NOTES
In basket notification of BPCI episode received  Chart reviewed  I called the only number listed for the patient and I received a recording for [employer]  I then called the patient's partner and got a recording stating "not accepting calls at this time"

## 2020-08-27 ENCOUNTER — PATIENT OUTREACH (OUTPATIENT)
Dept: CASE MANAGEMENT | Facility: OTHER | Age: 55
End: 2020-08-27

## 2020-08-31 ENCOUNTER — PATIENT OUTREACH (OUTPATIENT)
Dept: CASE MANAGEMENT | Facility: OTHER | Age: 55
End: 2020-08-31

## 2020-08-31 NOTE — PROGRESS NOTES
Third outreach attempt  Unable to reach patient on either number listed  No answer, and no voicemail

## 2020-09-13 DIAGNOSIS — I50.41 ACUTE COMBINED SYSTOLIC AND DIASTOLIC CONGESTIVE HEART FAILURE (HCC): ICD-10-CM

## 2020-09-13 DIAGNOSIS — K21.9 GASTROESOPHAGEAL REFLUX DISEASE WITHOUT ESOPHAGITIS: ICD-10-CM

## 2020-09-14 DIAGNOSIS — E66.01 MORBID OBESITY (HCC): ICD-10-CM

## 2020-09-14 DIAGNOSIS — J43.8 OTHER EMPHYSEMA (HCC): ICD-10-CM

## 2020-09-14 RX ORDER — ATORVASTATIN CALCIUM 40 MG/1
TABLET, FILM COATED ORAL
Qty: 30 TABLET | Refills: 0 | OUTPATIENT
Start: 2020-09-14

## 2020-09-14 RX ORDER — METOPROLOL SUCCINATE 50 MG/1
TABLET, EXTENDED RELEASE ORAL
Qty: 30 TABLET | Refills: 0 | OUTPATIENT
Start: 2020-09-14

## 2020-09-14 RX ORDER — FAMOTIDINE 20 MG/1
TABLET, FILM COATED ORAL
Qty: 30 TABLET | Refills: 0 | OUTPATIENT
Start: 2020-09-14

## 2020-09-14 RX ORDER — BUMETANIDE 0.5 MG/1
0.5 TABLET ORAL 2 TIMES DAILY
Qty: 60 TABLET | Refills: 0 | OUTPATIENT
Start: 2020-09-14

## 2020-10-12 ENCOUNTER — HOSPITAL ENCOUNTER (EMERGENCY)
Facility: HOSPITAL | Age: 55
Discharge: ANOTHER HOSPITAL | End: 2020-10-12
Attending: EMERGENCY MEDICINE | Admitting: EMERGENCY MEDICINE
Payer: MEDICARE

## 2020-10-12 ENCOUNTER — APPOINTMENT (EMERGENCY)
Dept: RADIOLOGY | Facility: HOSPITAL | Age: 55
End: 2020-10-12
Payer: MEDICARE

## 2020-10-12 VITALS
RESPIRATION RATE: 28 BRPM | DIASTOLIC BLOOD PRESSURE: 87 MMHG | WEIGHT: 313 LBS | TEMPERATURE: 97.4 F | OXYGEN SATURATION: 95 % | SYSTOLIC BLOOD PRESSURE: 134 MMHG | HEART RATE: 99 BPM | HEIGHT: 69 IN | BODY MASS INDEX: 46.36 KG/M2

## 2020-10-12 DIAGNOSIS — I47.2 VENTRICULAR TACHYCARDIA (HCC): ICD-10-CM

## 2020-10-12 DIAGNOSIS — I50.43 ACUTE ON CHRONIC COMBINED SYSTOLIC AND DIASTOLIC CHF (CONGESTIVE HEART FAILURE) (HCC): Primary | ICD-10-CM

## 2020-10-12 LAB
ANION GAP SERPL CALCULATED.3IONS-SCNC: 10 MMOL/L (ref 4–13)
ATRIAL RATE: 108 BPM
ATRIAL RATE: 99 BPM
BASOPHILS # BLD AUTO: 0.1 THOUSANDS/ΜL (ref 0–0.1)
BASOPHILS NFR BLD AUTO: 1 % (ref 0–2)
BNP SERPL-MCNC: 1375 PG/ML (ref 1–100)
BUN SERPL-MCNC: 25 MG/DL (ref 7–25)
CALCIUM SERPL-MCNC: 9.2 MG/DL (ref 8.6–10.5)
CHLORIDE SERPL-SCNC: 103 MMOL/L (ref 98–107)
CO2 SERPL-SCNC: 21 MMOL/L (ref 21–31)
CREAT SERPL-MCNC: 1.7 MG/DL (ref 0.7–1.3)
EOSINOPHIL # BLD AUTO: 0.1 THOUSAND/ΜL (ref 0–0.61)
EOSINOPHIL NFR BLD AUTO: 2 % (ref 0–5)
ERYTHROCYTE [DISTWIDTH] IN BLOOD BY AUTOMATED COUNT: 18 % (ref 11.5–14.5)
GFR SERPL CREATININE-BSD FRML MDRD: 51 ML/MIN/1.73SQ M
GLUCOSE SERPL-MCNC: 132 MG/DL (ref 65–99)
HCT VFR BLD AUTO: 50.4 % (ref 42–47)
HGB BLD-MCNC: 15.8 G/DL (ref 14–18)
INR PPP: 1.6 (ref 0.84–1.19)
LYMPHOCYTES # BLD AUTO: 2.8 THOUSANDS/ΜL (ref 0.6–4.47)
LYMPHOCYTES NFR BLD AUTO: 31 % (ref 21–51)
MCH RBC QN AUTO: 28 PG (ref 26–34)
MCHC RBC AUTO-ENTMCNC: 31.4 G/DL (ref 31–37)
MCV RBC AUTO: 89 FL (ref 81–99)
MONOCYTES # BLD AUTO: 1 THOUSAND/ΜL (ref 0.17–1.22)
MONOCYTES NFR BLD AUTO: 11 % (ref 2–12)
NEUTROPHILS # BLD AUTO: 5 THOUSANDS/ΜL (ref 1.4–6.5)
NEUTS SEG NFR BLD AUTO: 56 % (ref 42–75)
P AXIS: 62 DEGREES
P AXIS: 67 DEGREES
PLATELET # BLD AUTO: 240 THOUSANDS/UL (ref 149–390)
PMV BLD AUTO: 8.1 FL (ref 8.6–11.7)
POTASSIUM SERPL-SCNC: 4.2 MMOL/L (ref 3.5–5.5)
PR INTERVAL: 150 MS
PR INTERVAL: 154 MS
PROTHROMBIN TIME: 18.9 SECONDS (ref 11.6–14.5)
QRS AXIS: 17 DEGREES
QRS AXIS: 39 DEGREES
QRSD INTERVAL: 74 MS
QRSD INTERVAL: 76 MS
QT INTERVAL: 342 MS
QT INTERVAL: 366 MS
QTC INTERVAL: 458 MS
QTC INTERVAL: 469 MS
RBC # BLD AUTO: 5.66 MILLION/UL (ref 4.3–5.9)
SODIUM SERPL-SCNC: 134 MMOL/L (ref 134–143)
T WAVE AXIS: -15 DEGREES
T WAVE AXIS: -24 DEGREES
TROPONIN I SERPL-MCNC: 0.17 NG/ML
VENTRICULAR RATE: 108 BPM
VENTRICULAR RATE: 99 BPM
WBC # BLD AUTO: 9 THOUSAND/UL (ref 4.8–10.8)

## 2020-10-12 PROCEDURE — 93010 ELECTROCARDIOGRAM REPORT: CPT | Performed by: INTERNAL MEDICINE

## 2020-10-12 PROCEDURE — 80048 BASIC METABOLIC PNL TOTAL CA: CPT | Performed by: EMERGENCY MEDICINE

## 2020-10-12 PROCEDURE — 71045 X-RAY EXAM CHEST 1 VIEW: CPT

## 2020-10-12 PROCEDURE — 85610 PROTHROMBIN TIME: CPT | Performed by: EMERGENCY MEDICINE

## 2020-10-12 PROCEDURE — 83880 ASSAY OF NATRIURETIC PEPTIDE: CPT | Performed by: EMERGENCY MEDICINE

## 2020-10-12 PROCEDURE — 99291 CRITICAL CARE FIRST HOUR: CPT | Performed by: EMERGENCY MEDICINE

## 2020-10-12 PROCEDURE — 36415 COLL VENOUS BLD VENIPUNCTURE: CPT | Performed by: EMERGENCY MEDICINE

## 2020-10-12 PROCEDURE — 85025 COMPLETE CBC W/AUTO DIFF WBC: CPT | Performed by: EMERGENCY MEDICINE

## 2020-10-12 PROCEDURE — 96374 THER/PROPH/DIAG INJ IV PUSH: CPT

## 2020-10-12 PROCEDURE — 84484 ASSAY OF TROPONIN QUANT: CPT | Performed by: EMERGENCY MEDICINE

## 2020-10-12 PROCEDURE — 93005 ELECTROCARDIOGRAM TRACING: CPT

## 2020-10-12 PROCEDURE — 99285 EMERGENCY DEPT VISIT HI MDM: CPT

## 2020-10-12 RX ORDER — ASPIRIN 81 MG/1
324 TABLET, CHEWABLE ORAL ONCE
Status: COMPLETED | OUTPATIENT
Start: 2020-10-12 | End: 2020-10-12

## 2020-10-12 RX ORDER — METOPROLOL TARTRATE 5 MG/5ML
5 INJECTION INTRAVENOUS ONCE
Status: COMPLETED | OUTPATIENT
Start: 2020-10-12 | End: 2020-10-12

## 2020-10-12 RX ORDER — SODIUM CHLORIDE 9 MG/ML
3 INJECTION INTRAVENOUS
Status: DISCONTINUED | OUTPATIENT
Start: 2020-10-12 | End: 2020-10-12 | Stop reason: HOSPADM

## 2020-10-12 RX ADMIN — ASPIRIN 324 MG: 81 TABLET, CHEWABLE ORAL at 04:20

## 2020-10-12 RX ADMIN — METOPROLOL TARTRATE 5 MG: 5 INJECTION, SOLUTION INTRAVENOUS at 04:50

## 2020-11-11 RX ORDER — DICLOFENAC SODIUM 75 MG/1
TABLET, DELAYED RELEASE ORAL
Qty: 60 TABLET | Refills: 1 | OUTPATIENT
Start: 2020-11-11

## 2020-11-18 ENCOUNTER — EPISODE CHANGES (OUTPATIENT)
Dept: CASE MANAGEMENT | Facility: OTHER | Age: 55
End: 2020-11-18

## 2020-11-29 DIAGNOSIS — J43.8 OTHER EMPHYSEMA (HCC): ICD-10-CM

## 2020-11-29 DIAGNOSIS — E66.01 MORBID OBESITY (HCC): ICD-10-CM

## 2020-12-21 DIAGNOSIS — I50.41 ACUTE COMBINED SYSTOLIC AND DIASTOLIC CONGESTIVE HEART FAILURE (HCC): ICD-10-CM

## 2020-12-21 RX ORDER — ASPIRIN 81 MG/1
TABLET, COATED ORAL
Qty: 30 TABLET | Refills: 0 | Status: SHIPPED | OUTPATIENT
Start: 2020-12-21 | End: 2021-01-25

## 2021-01-18 ENCOUNTER — APPOINTMENT (EMERGENCY)
Dept: CT IMAGING | Facility: HOSPITAL | Age: 56
End: 2021-01-18
Payer: MEDICARE

## 2021-01-18 ENCOUNTER — HOSPITAL ENCOUNTER (EMERGENCY)
Facility: HOSPITAL | Age: 56
Discharge: HOME | End: 2021-01-18
Attending: EMERGENCY MEDICINE | Admitting: EMERGENCY MEDICINE
Payer: MEDICARE

## 2021-01-18 VITALS
OXYGEN SATURATION: 92 % | DIASTOLIC BLOOD PRESSURE: 76 MMHG | WEIGHT: 309.53 LBS | HEART RATE: 105 BPM | BODY MASS INDEX: 45.84 KG/M2 | HEIGHT: 69 IN | SYSTOLIC BLOOD PRESSURE: 129 MMHG | TEMPERATURE: 97.7 F | RESPIRATION RATE: 18 BRPM

## 2021-01-18 DIAGNOSIS — R10.9 LEFT FLANK PAIN: Primary | ICD-10-CM

## 2021-01-18 DIAGNOSIS — N20.0 BILATERAL RENAL STONES: ICD-10-CM

## 2021-01-18 LAB
ANION GAP SERPL CALCULATED.3IONS-SCNC: 11 MMOL/L (ref 4–13)
BACTERIA UR QL AUTO: NORMAL /HPF
BASOPHILS # BLD AUTO: 0.05 THOUSANDS/ΜL (ref 0–0.1)
BASOPHILS NFR BLD AUTO: 1 % (ref 0–1)
BILIRUB UR QL STRIP: NEGATIVE
BUN SERPL-MCNC: 21 MG/DL (ref 5–25)
CALCIUM SERPL-MCNC: 8.6 MG/DL (ref 8.3–10.1)
CHLORIDE SERPL-SCNC: 104 MMOL/L (ref 100–108)
CLARITY UR: CLEAR
CO2 SERPL-SCNC: 24 MMOL/L (ref 21–32)
COLOR UR: YELLOW
CREAT SERPL-MCNC: 1.47 MG/DL (ref 0.6–1.3)
EOSINOPHIL # BLD AUTO: 0.21 THOUSAND/ΜL (ref 0–0.61)
EOSINOPHIL NFR BLD AUTO: 2 % (ref 0–6)
ERYTHROCYTE [DISTWIDTH] IN BLOOD BY AUTOMATED COUNT: 17.8 % (ref 11.6–15.1)
GFR SERPL CREATININE-BSD FRML MDRD: 60 ML/MIN/1.73SQ M
GLUCOSE SERPL-MCNC: 94 MG/DL (ref 65–140)
GLUCOSE UR STRIP-MCNC: NEGATIVE MG/DL
HCT VFR BLD AUTO: 46.2 % (ref 36.5–49.3)
HGB BLD-MCNC: 15 G/DL (ref 12–17)
HGB UR QL STRIP.AUTO: NEGATIVE
IMM GRANULOCYTES # BLD AUTO: 0.04 THOUSAND/UL (ref 0–0.2)
IMM GRANULOCYTES NFR BLD AUTO: 0 % (ref 0–2)
KETONES UR STRIP-MCNC: NEGATIVE MG/DL
LEUKOCYTE ESTERASE UR QL STRIP: NEGATIVE
LYMPHOCYTES # BLD AUTO: 3 THOUSANDS/ΜL (ref 0.6–4.47)
LYMPHOCYTES NFR BLD AUTO: 30 % (ref 14–44)
MCH RBC QN AUTO: 29.7 PG (ref 26.8–34.3)
MCHC RBC AUTO-ENTMCNC: 32.5 G/DL (ref 31.4–37.4)
MCV RBC AUTO: 92 FL (ref 82–98)
MONOCYTES # BLD AUTO: 0.89 THOUSAND/ΜL (ref 0.17–1.22)
MONOCYTES NFR BLD AUTO: 9 % (ref 4–12)
NEUTROPHILS # BLD AUTO: 5.98 THOUSANDS/ΜL (ref 1.85–7.62)
NEUTS SEG NFR BLD AUTO: 58 % (ref 43–75)
NITRITE UR QL STRIP: NEGATIVE
NON-SQ EPI CELLS URNS QL MICRO: NORMAL /HPF
NRBC BLD AUTO-RTO: 0 /100 WBCS
PH UR STRIP.AUTO: 6.5 [PH]
PLATELET # BLD AUTO: 321 THOUSANDS/UL (ref 149–390)
PMV BLD AUTO: 9.2 FL (ref 8.9–12.7)
POTASSIUM SERPL-SCNC: 4 MMOL/L (ref 3.5–5.3)
PROT UR STRIP-MCNC: ABNORMAL MG/DL
RBC # BLD AUTO: 5.05 MILLION/UL (ref 3.88–5.62)
RBC #/AREA URNS AUTO: NORMAL /HPF
SODIUM SERPL-SCNC: 139 MMOL/L (ref 136–145)
SP GR UR STRIP.AUTO: 1.01 (ref 1–1.03)
UROBILINOGEN UR QL STRIP.AUTO: 0.2 E.U./DL
WBC # BLD AUTO: 10.17 THOUSAND/UL (ref 4.31–10.16)
WBC #/AREA URNS AUTO: NORMAL /HPF

## 2021-01-18 PROCEDURE — 80048 BASIC METABOLIC PNL TOTAL CA: CPT | Performed by: EMERGENCY MEDICINE

## 2021-01-18 PROCEDURE — 99284 EMERGENCY DEPT VISIT MOD MDM: CPT

## 2021-01-18 PROCEDURE — 74176 CT ABD & PELVIS W/O CONTRAST: CPT

## 2021-01-18 PROCEDURE — 36415 COLL VENOUS BLD VENIPUNCTURE: CPT | Performed by: EMERGENCY MEDICINE

## 2021-01-18 PROCEDURE — 81001 URINALYSIS AUTO W/SCOPE: CPT | Performed by: EMERGENCY MEDICINE

## 2021-01-18 PROCEDURE — 85025 COMPLETE CBC W/AUTO DIFF WBC: CPT | Performed by: EMERGENCY MEDICINE

## 2021-01-18 PROCEDURE — G1004 CDSM NDSC: HCPCS

## 2021-01-18 PROCEDURE — 99284 EMERGENCY DEPT VISIT MOD MDM: CPT | Performed by: EMERGENCY MEDICINE

## 2021-01-18 RX ORDER — DICLOFENAC POTASSIUM 25 MG/1
CAPSULE, LIQUID FILLED ORAL
COMMUNITY

## 2021-01-18 RX ORDER — TORSEMIDE 20 MG/1
20 TABLET ORAL DAILY
COMMUNITY

## 2021-01-18 RX ORDER — SERTRALINE HYDROCHLORIDE 25 MG/1
25 TABLET, FILM COATED ORAL DAILY
COMMUNITY

## 2021-01-19 NOTE — ED PROVIDER NOTES
History  Chief Complaint   Patient presents with    Flank Pain     c/o left flank pain since this afternoon  denies urinary symptoms      This is a 57-year-old male with a history of hypertension, hyperlipidemia, CHF, COPD who presents with left-sided flank pain  Earlier this afternoon, the patient was urinating  At the end of urination, he started to experience a left-sided flank pain  This pain ended up subsiding on its own  Later in the evening, he was urinating again  He started to experience the same left-sided flank pain which is described as sharp, constant, nonradiating, without any associated symptoms  He has not taken anything for the pain  Denies any exacerbating factors  Denies any history of kidney stones  Denies fever/chills, nausea/vomiting, lightheadedness/dizziness, numbness/weakness, headache, change in vision, URI symptoms, neck pain, chest pain, palpitations, shortness of breath, cough, back pain, abdominal pain, diarrhea, hematochezia, melena, dysuria, hematuria  Prior to Admission Medications   Prescriptions Last Dose Informant Patient Reported? Taking?    Aspirin Low Dose 81 MG EC tablet 1/18/2021 at Unknown time  No Yes   Sig: TAKE 1 TABLET BY MOUTH EVERY DAY   Diclofenac Potassium 25 MG CAPS 1/18/2021 at Unknown time  Yes Yes   Sig: Take by mouth   Ventolin  (90 Base) MCG/ACT inhaler Not Taking at Unknown time  No No   Sig: TAKE 2 PUFFS BY MOUTH EVERY 6 HOURS AS NEEDED FOR WHEEZE   Patient not taking: Reported on 1/18/2021   atorvastatin (LIPITOR) 40 mg tablet Not Taking at Unknown time  No No   Sig: Take 1 tablet (40 mg total) by mouth daily with dinner   Patient not taking: Reported on 1/18/2021   bumetanide (BUMEX) 0 5 MG tablet Not Taking at Unknown time  No No   Sig: Take 1 tablet (0 5 mg total) by mouth 2 (two) times a day   Patient not taking: Reported on 1/18/2021   cyclobenzaprine (FLEXERIL) 10 mg tablet 1/18/2021 at Unknown time Self No Yes   Sig: Take 1 tablet (10 mg total) by mouth 2 (two) times a day as needed for muscle spasms   diclofenac sodium (VOLTAREN) 1 % Not Taking at Unknown time  No No   Sig: Apply 2 g topically daily   Patient not taking: Reported on 1/18/2021   famotidine (PEPCID) 20 mg tablet 1/18/2021 at Unknown time  No Yes   Sig: Take 1 tablet (20 mg total) by mouth daily   metoprolol succinate (TOPROL-XL) 50 mg 24 hr tablet Not Taking at Unknown time  No No   Sig: Take 1 tablet (50 mg total) by mouth daily   Patient not taking: Reported on 1/18/2021   pregabalin (LYRICA) 200 MG capsule Not Taking at Unknown time Self Yes No   Sig: Take 200 mg by mouth daily    sacubitril-valsartan (ENTRESTO) 24-26 MG TABS 1/18/2021 at Unknown time  No Yes   Sig: Take 1 tablet by mouth 2 (two) times a day   sertraline (ZOLOFT) 25 mg tablet 1/18/2021 at Unknown time  Yes Yes   Sig: Take 25 mg by mouth daily   torsemide (DEMADEX) 20 mg tablet 1/18/2021 at Unknown time  Yes Yes   Sig: Take 20 mg by mouth daily      Facility-Administered Medications: None       Past Medical History:   Diagnosis Date    Asthma     Back pain     Cardiomyopathy     Carpal tunnel syndrome     Chronic combined systolic and diastolic CHF (congestive heart failure)     COPD (chronic obstructive pulmonary disease)     GERD (gastroesophageal reflux disease)     Hypertension     Knee pain     Neck pain        Past Surgical History:   Procedure Laterality Date    CARPAL TUNNEL RELEASE      HAND SURGERY         Family History   Problem Relation Age of Onset    Cancer Mother     Hypertension Mother     Hypertension Father     Gout Father     Heart attack Father     Heart disease Father     Lung cancer Sister      I have reviewed and agree with the history as documented      E-Cigarette/Vaping    E-Cigarette Use Never User      E-Cigarette/Vaping Substances     Social History     Tobacco Use    Smoking status: Former Smoker     Packs/day: 0 50     Years: 36 00     Pack years: 18 00     Types: Cigarettes    Smokeless tobacco: Never Used   Substance Use Topics    Alcohol use: Never     Frequency: Never     Comment: occasional    Drug use: Not Currently     Comment: periodically/on ocassion       Review of Systems   Constitutional: Negative for chills, fatigue and fever  HENT: Negative for rhinorrhea, sore throat and trouble swallowing  Eyes: Negative for photophobia and visual disturbance  Respiratory: Negative for cough, chest tightness and shortness of breath  Cardiovascular: Negative for chest pain, palpitations and leg swelling  Gastrointestinal: Negative for abdominal pain, blood in stool, diarrhea, nausea and vomiting  Endocrine: Negative for polyuria  Genitourinary: Positive for flank pain  Negative for dysuria and hematuria  Musculoskeletal: Negative for back pain and neck pain  Skin: Negative for color change and rash  Allergic/Immunologic: Negative for immunocompromised state  Neurological: Negative for dizziness, weakness, light-headedness, numbness and headaches  All other systems reviewed and are negative  Physical Exam  Physical Exam  Constitutional:       General: He is not in acute distress  Appearance: Normal appearance  He is well-developed  HENT:      Mouth/Throat:      Pharynx: Uvula midline  Eyes:      General: Lids are normal       Conjunctiva/sclera: Conjunctivae normal       Pupils: Pupils are equal, round, and reactive to light  Neck:      Thyroid: No thyroid mass or thyromegaly  Trachea: Trachea normal    Cardiovascular:      Rate and Rhythm: Normal rate and regular rhythm  Pulses: Normal pulses  Heart sounds: Normal heart sounds  No murmur  Pulmonary:      Effort: Pulmonary effort is normal       Breath sounds: Normal breath sounds  Abdominal:      General: Bowel sounds are normal       Palpations: Abdomen is soft  Tenderness: There is no abdominal tenderness  There is left CVA tenderness   There is no guarding or rebound  Negative signs include Hdz's sign  Skin:     General: Skin is warm and dry  Neurological:      Mental Status: He is alert  Psychiatric:         Speech: Speech normal          Behavior: Behavior normal  Behavior is cooperative  Thought Content:  Thought content normal          Vital Signs  ED Triage Vitals [01/18/21 2037]   Temperature Pulse Respirations Blood Pressure SpO2   97 7 °F (36 5 °C) 99 20 125/86 96 %      Temp Source Heart Rate Source Patient Position - Orthostatic VS BP Location FiO2 (%)   Temporal Monitor Sitting Left arm --      Pain Score       7           Vitals:    01/18/21 2115 01/18/21 2130 01/18/21 2200 01/18/21 2230   BP: 116/79 121/87 126/91 129/76   Pulse: (!) 108 (!) 106 104 105   Patient Position - Orthostatic VS: Sitting Sitting Sitting Sitting         Visual Acuity      ED Medications  Medications - No data to display    Diagnostic Studies  Results Reviewed     Procedure Component Value Units Date/Time    Urine Microscopic [[id number]]  (Normal) Collected: 01/18/21 2227    Lab Status: Final result Specimen: Urine, Clean Catch Updated: 01/18/21 2242     RBC, UA None Seen /hpf      WBC, UA None Seen /hpf      Epithelial Cells Occasional /hpf      Bacteria, UA Occasional /hpf     UA w Reflex to Microscopic w Reflex to Culture [[id number]]  (Abnormal) Collected: 01/18/21 2227    Lab Status: Final result Specimen: Urine, Clean Catch Updated: 01/18/21 2241     Color, UA Yellow     Clarity, UA Clear     Specific Gravity, UA 1 015     pH, UA 6 5     Leukocytes, UA Negative     Nitrite, UA Negative     Protein, UA 30 (1+) mg/dl      Glucose, UA Negative mg/dl      Ketones, UA Negative mg/dl      Urobilinogen, UA 0 2 E U /dl      Bilirubin, UA Negative     Blood, UA Negative    Basic metabolic panel [[id number]]  (Abnormal) Collected: 01/18/21 2048    Lab Status: Final result Specimen: Blood from Arm, Right Updated: 01/18/21 2107     Sodium 139 mmol/L Potassium 4 0 mmol/L      Chloride 104 mmol/L      CO2 24 mmol/L      ANION GAP 11 mmol/L      BUN 21 mg/dL      Creatinine 1 47 mg/dL      Glucose 94 mg/dL      Calcium 8 6 mg/dL      eGFR 60 ml/min/1 73sq m     Narrative:      [place] guidelines for Chronic Kidney Disease (CKD):     Stage 1 with normal or high GFR (GFR > 90 mL/min/1 73 square meters)    Stage 2 Mild CKD (GFR = 60-89 mL/min/1 73 square meters)    Stage 3A Moderate CKD (GFR = 45-59 mL/min/1 73 square meters)    Stage 3B Moderate CKD (GFR = 30-44 mL/min/1 73 square meters)    Stage 4 Severe CKD (GFR = 15-29 mL/min/1 73 square meters)    Stage 5 End Stage CKD (GFR <15 mL/min/1 73 square meters)  Note: GFR calculation is accurate only with a steady state creatinine    CBC and differential [[id number]]  (Abnormal) Collected: 01/18/21 2048    Lab Status: Final result Specimen: Blood from Arm, Right Updated: 01/18/21 2058     WBC 10 17 Thousand/uL      RBC 5 05 Million/uL      Hemoglobin 15 0 g/dL      Hematocrit 46 2 %      MCV 92 fL      MCH 29 7 pg      MCHC 32 5 g/dL      RDW 17 8 %      MPV 9 2 fL      Platelets 833 Thousands/uL      nRBC 0 /100 WBCs      Neutrophils Relative 58 %      Immat GRANS % 0 %      Lymphocytes Relative 30 %      Monocytes Relative 9 %      Eosinophils Relative 2 %      Basophils Relative 1 %      Neutrophils Absolute 5 98 Thousands/µL      Immature Grans Absolute 0 04 Thousand/uL      Lymphocytes Absolute 3 00 Thousands/µL      Monocytes Absolute 0 89 Thousand/µL      Eosinophils Absolute 0 21 Thousand/µL      Basophils Absolute 0 05 Thousands/µL                  CT renal stone study abdomen pelvis without contrast   Final Result by Arun Escamilla MD (01/18 2210)      1  Nonobstructing bilateral renal calculi  No hydronephrosis  2   Colonic diverticulosis without evidence of acute diverticulitis               Workstation performed: [id number]                    Procedures  Procedures ED Course  ED Course as of Jan 18 2305 Mon Jan 18, 2021 2107 stable   Creatinine(!): 1 47                             SBIRT 22yo+      Most Recent Value   SBIRT (25 yo +)   In order to provide better care to our patients, we are screening all of our patients for alcohol and drug use  Would it be okay to ask you these screening questions? Yes Filed at: 01/18/2021 2049   Initial Alcohol Screen: US AUDIT-C    1  How often do you have a drink containing alcohol?  0 Filed at: 01/18/2021 2049   2  How many drinks containing alcohol do you have on a typical day you are drinking? 0 Filed at: 01/18/2021 2049   3a  Male UNDER 65: How often do you have five or more drinks on one occasion? 0 Filed at: 01/18/2021 2049   3b  FEMALE Any Age, or MALE 65+: How often do you have 4 or more drinks on one occassion? 0 Filed at: 01/18/2021 2049   Audit-C Score  0 Filed at: 01/18/2021 2049   LORENZO: How many times in the past year have you    Used an illegal drug or used a prescription medication for non-medical reasons? Never Filed at: 01/18/2021 2049                    MDM  Number of Diagnoses or Management Options  Diagnosis management comments: Plan to check labs and urinalysis  CT abdomen/pelvis renal stone study  Disposition pending results  Disposition  Final diagnoses:   Left flank pain   Bilateral renal stones     Time reflects when diagnosis was documented in both MDM as applicable and the Disposition within this note     Time User Action Codes Description Comment    1/18/2021 10:47 PM Sarah Dust P Add [R10 9] Left flank pain     1/18/2021 10:47 PM Sarah Dust P Add [N20 0] Bilateral renal stones       ED Disposition     ED Disposition Condition Date/Time Comment    Discharge Stable Mon Jan 18, 2021 10:47 PM Gerry Yin discharge to home/self care              Follow-up Information     Follow up With Specialties Details Why Contact Info Additional Soni [address], DO Family Medicine Schedule an appointment as soon as possible for a visit   [facility] [id number] [facility] Emergency Department Emergency Medicine Go to  If symptoms worsen [address] [id number] 76 [facility], [address], [address]          Discharge Medication List as of 1/18/2021 10:47 PM      CONTINUE these medications which have NOT CHANGED    Details   Aspirin Low Dose 81 MG EC tablet TAKE 1 TABLET BY MOUTH EVERY DAY, Normal      cyclobenzaprine (FLEXERIL) 10 mg tablet Take 1 tablet (10 mg total) by mouth 2 (two) times a day as needed for muscle spasms, Starting Wed 10/23/2019, Normal      Diclofenac Potassium 25 MG CAPS Take by mouth, Historical Med      famotidine (PEPCID) 20 mg tablet Take 1 tablet (20 mg total) by mouth daily, Starting Fri 8/21/2020, Normal      sacubitril-valsartan (ENTRESTO) 24-26 MG TABS Take 1 tablet by mouth 2 (two) times a day, Starting Fri 8/21/2020, Normal      sertraline (ZOLOFT) 25 mg tablet Take 25 mg by mouth daily, Historical Med      torsemide (DEMADEX) 20 mg tablet Take 20 mg by mouth daily, Historical Med      atorvastatin (LIPITOR) 40 mg tablet Take 1 tablet (40 mg total) by mouth daily with dinner, Starting Fri 8/21/2020, Normal      bumetanide (BUMEX) 0 5 MG tablet Take 1 tablet (0 5 mg total) by mouth 2 (two) times a day, Starting Fri 8/21/2020, Normal      diclofenac sodium (VOLTAREN) 1 % Apply 2 g topically daily, Starting Fri 8/21/2020, Normal      metoprolol succinate (TOPROL-XL) 50 mg 24 hr tablet Take 1 tablet (50 mg total) by mouth daily, Starting Fri 8/21/2020, Normal      pregabalin (LYRICA) 200 MG capsule Take 200 mg by mouth daily , Historical Med      Ventolin  (90 Base) MCG/ACT inhaler TAKE 2 PUFFS BY MOUTH EVERY 6 HOURS AS NEEDED FOR WHEEZE, Normal           No discharge procedures on file      PDMP Review None          ED Provider  Electronically Signed by           Jaclyn Gaming MD  01/18/21 5859

## 2021-01-22 DIAGNOSIS — I50.41 ACUTE COMBINED SYSTOLIC AND DIASTOLIC CONGESTIVE HEART FAILURE (HCC): ICD-10-CM

## 2021-01-25 RX ORDER — ASPIRIN 81 MG/1
TABLET ORAL
Qty: 30 TABLET | Refills: 0 | Status: SHIPPED | OUTPATIENT
Start: 2021-01-25 | End: 2021-02-17

## 2021-02-17 DIAGNOSIS — I50.41 ACUTE COMBINED SYSTOLIC AND DIASTOLIC CONGESTIVE HEART FAILURE (HCC): ICD-10-CM

## 2021-02-17 RX ORDER — ASPIRIN 81 MG/1
TABLET, COATED ORAL
Qty: 30 TABLET | Refills: 0 | Status: SHIPPED | OUTPATIENT
Start: 2021-02-17